# Patient Record
Sex: FEMALE | Race: WHITE | NOT HISPANIC OR LATINO | Employment: FULL TIME | URBAN - METROPOLITAN AREA
[De-identification: names, ages, dates, MRNs, and addresses within clinical notes are randomized per-mention and may not be internally consistent; named-entity substitution may affect disease eponyms.]

---

## 2017-02-09 DIAGNOSIS — Z00.00 ROUTINE ADULT HEALTH MAINTENANCE: Primary | ICD-10-CM

## 2017-02-10 ENCOUNTER — LAB (OUTPATIENT)
Dept: FAMILY MEDICINE CLINIC | Facility: CLINIC | Age: 27
End: 2017-02-10

## 2017-02-10 DIAGNOSIS — Z00.00 ROUTINE ADULT HEALTH MAINTENANCE: ICD-10-CM

## 2017-02-10 LAB
ALBUMIN SERPL-MCNC: 4.2 G/DL (ref 3.5–5.2)
ALBUMIN/GLOB SERPL: 1.6 G/DL
ALP SERPL-CCNC: 74 U/L (ref 40–129)
ALT SERPL-CCNC: 16 U/L (ref 5–33)
AST SERPL-CCNC: 20 U/L (ref 5–32)
BILIRUB SERPL-MCNC: 0.5 MG/DL (ref 0.2–1.2)
BUN SERPL-MCNC: 11 MG/DL (ref 6–20)
BUN/CREAT SERPL: 15.3 (ref 7–25)
CALCIUM SERPL-MCNC: 9.1 MG/DL (ref 8.6–10.5)
CHLORIDE SERPL-SCNC: 102 MMOL/L (ref 98–107)
CHOLEST SERPL-MCNC: 161 MG/DL (ref 0–200)
CO2 SERPL-SCNC: 27.1 MMOL/L (ref 22–29)
CREAT SERPL-MCNC: 0.72 MG/DL (ref 0.57–1)
ERYTHROCYTE [DISTWIDTH] IN BLOOD BY AUTOMATED COUNT: 13.1 % (ref 11.5–14.5)
GLOBULIN SER CALC-MCNC: 2.7 GM/DL
GLUCOSE SERPL-MCNC: 83 MG/DL (ref 65–99)
HCT VFR BLD AUTO: 40.6 % (ref 37–47)
HDLC SERPL-MCNC: 55 MG/DL (ref 40–60)
HGB BLD-MCNC: 13.2 G/DL (ref 12–16)
LDLC SERPL CALC-MCNC: 89 MG/DL (ref 0–100)
MCH RBC QN AUTO: 28.8 PG (ref 27–31)
MCHC RBC AUTO-ENTMCNC: 32.5 G/DL (ref 31–37)
MCV RBC AUTO: 88.6 FL (ref 81–99)
PLATELET # BLD AUTO: 244 10*3/MM3 (ref 140–500)
POTASSIUM SERPL-SCNC: 4.4 MMOL/L (ref 3.5–5.2)
PROT SERPL-MCNC: 6.9 G/DL (ref 6–8.5)
RBC # BLD AUTO: 4.58 10*6/MM3 (ref 4.2–5.4)
SODIUM SERPL-SCNC: 140 MMOL/L (ref 136–145)
TRIGL SERPL-MCNC: 84 MG/DL (ref 0–150)
VLDLC SERPL CALC-MCNC: 16.8 MG/DL (ref 7–27)
WBC # BLD AUTO: 9.51 10*3/MM3 (ref 4.8–10.8)

## 2017-02-24 ENCOUNTER — OFFICE VISIT (OUTPATIENT)
Dept: FAMILY MEDICINE CLINIC | Facility: CLINIC | Age: 27
End: 2017-02-24

## 2017-02-24 VITALS
WEIGHT: 248.5 LBS | TEMPERATURE: 98.2 F | HEART RATE: 86 BPM | SYSTOLIC BLOOD PRESSURE: 132 MMHG | OXYGEN SATURATION: 99 % | DIASTOLIC BLOOD PRESSURE: 74 MMHG | HEIGHT: 66 IN | BODY MASS INDEX: 39.94 KG/M2

## 2017-02-24 DIAGNOSIS — T50.905A WEIGHT GAIN DUE TO MEDICATION: ICD-10-CM

## 2017-02-24 DIAGNOSIS — R63.5 WEIGHT GAIN DUE TO MEDICATION: ICD-10-CM

## 2017-02-24 DIAGNOSIS — Z00.00 ROUTINE ADULT HEALTH MAINTENANCE: Primary | ICD-10-CM

## 2017-02-24 DIAGNOSIS — J45.30 MILD PERSISTENT ASTHMA WITHOUT COMPLICATION: ICD-10-CM

## 2017-02-24 LAB — TSH SERPL DL<=0.005 MIU/L-ACNC: 0.66 MIU/ML (ref 0.27–4.2)

## 2017-02-24 PROCEDURE — 99213 OFFICE O/P EST LOW 20 MIN: CPT | Performed by: FAMILY MEDICINE

## 2017-02-24 NOTE — PROGRESS NOTES
Subjective   Paola Bowles is a 27 y.o. female who is here for   Chief Complaint   Patient presents with   • Asthma   .     History of Present Illness   Asthma: Paola Bowles presents for evaluation of asthma. Patient's symptoms include dyspnea, non-productive cough and wheezing. Associated symptoms include nasal congestion and nonproductive cough. The patient has been suffering from these symptoms for approximately 10 years. Symptoms have been rapidly improving since their onset. Medications used in the past to treat these symptoms include beta agonist inhalers, oral steroids, inhaled steroids and leukotriene antagonists. Suspected precipitants include dust and pollens. Patient is awoken from sleep approximately none times per night. Patient has required Emergency Room treatment for these symptoms, and has not required hospitalization. The patient has not been intubated in the past.  She has concerns about her weight gain due to her steroid inhaler  We discussed unlikely but we may try to changer her controller to ANORO,     O/w feels ok  Wants thyroid checked = fatigue , weight gain    The following portions of the patient's history were reviewed and updated as appropriate: allergies, current medications, past family history, past medical history, past social history, past surgical history and problem list.    Review of Systems    Objective   Physical Exam   Constitutional: She appears well-developed and well-nourished. No distress.   HENT:   Mouth/Throat: Oropharynx is clear and moist.   Eyes: Conjunctivae are normal.   Neck: Normal range of motion. No thyromegaly present.   Cardiovascular: Normal rate.    Pulmonary/Chest: Effort normal and breath sounds normal. No respiratory distress. She has no wheezes.   Neurological: She is alert.   Psychiatric: She has a normal mood and affect.   Nursing note and vitals reviewed.     Results for orders placed or performed in visit on 02/10/17   Lipid Panel   Result  Value Ref Range    Total Cholesterol 161 0 - 200 mg/dL    Triglycerides 84 0 - 150 mg/dL    HDL Cholesterol 55 40 - 60 mg/dL    VLDL Cholesterol 16.8 7 - 27 mg/dL    LDL Cholesterol  89 0 - 100 mg/dL   CBC (No Diff)   Result Value Ref Range    WBC 9.51 4.80 - 10.80 10*3/mm3    RBC 4.58 4.20 - 5.40 10*6/mm3    Hemoglobin 13.2 12.0 - 16.0 g/dL    Hematocrit 40.6 37.0 - 47.0 %    MCV 88.6 81.0 - 99.0 fL    MCH 28.8 27.0 - 31.0 pg    MCHC 32.5 31.0 - 37.0 g/dL    RDW 13.1 11.5 - 14.5 %    Platelets 244 140 - 500 10*3/mm3   Comprehensive Metabolic Panel   Result Value Ref Range    Glucose 83 65 - 99 mg/dL    BUN 11 6 - 20 mg/dL    Creatinine 0.72 0.57 - 1.00 mg/dL    eGFR Non African Am 97 >60 mL/min/1.73    eGFR African Am 118 >60 mL/min/1.73    BUN/Creatinine Ratio 15.3 7.0 - 25.0    Sodium 140 136 - 145 mmol/L    Potassium 4.4 3.5 - 5.2 mmol/L    Chloride 102 98 - 107 mmol/L    Total CO2 27.1 22.0 - 29.0 mmol/L    Calcium 9.1 8.6 - 10.5 mg/dL    Total Protein 6.9 6.0 - 8.5 g/dL    Albumin 4.20 3.50 - 5.20 g/dL    Globulin 2.7 gm/dL    A/G Ratio 1.6 g/dL    Total Bilirubin 0.5 0.2 - 1.2 mg/dL    Alkaline Phosphatase 74 40 - 129 U/L    AST (SGOT) 20 5 - 32 U/L    ALT (SGPT) 16 5 - 33 U/L         Assessment/Plan   Paola was seen today for asthma.    Diagnoses and all orders for this visit:    Routine adult health maintenance  -     TSH    Weight gain due to medication  -     Umeclidinium-Vilanterol (ANORO ELLIPTA) 62.5-25 MCG/INH aerosol powder ; Inhale 1 puff Daily.    Mild persistent asthma without complication      There are no Patient Instructions on file for this visit.    There are no discontinued medications.     Return in about 1 year (around 2/24/2018).    Dr. Luis Armando Kidd  [place], [place]

## 2017-04-27 ENCOUNTER — OFFICE VISIT (OUTPATIENT)
Dept: OBSTETRICS AND GYNECOLOGY | Facility: CLINIC | Age: 27
End: 2017-04-27

## 2017-04-27 VITALS
BODY MASS INDEX: 40.5 KG/M2 | SYSTOLIC BLOOD PRESSURE: 130 MMHG | HEIGHT: 66 IN | DIASTOLIC BLOOD PRESSURE: 70 MMHG | WEIGHT: 252 LBS

## 2017-04-27 DIAGNOSIS — Z30.09 ENCOUNTER FOR OTHER GENERAL COUNSELING OR ADVICE ON CONTRACEPTION: ICD-10-CM

## 2017-04-27 DIAGNOSIS — Z01.419 GYNECOLOGIC EXAM NORMAL: Primary | ICD-10-CM

## 2017-04-27 LAB
B-HCG UR QL: NEGATIVE
BILIRUB BLD-MCNC: NEGATIVE MG/DL
CLARITY, POC: CLEAR
COLOR UR: YELLOW
GLUCOSE UR STRIP-MCNC: NEGATIVE MG/DL
INTERNAL NEGATIVE CONTROL: NEGATIVE
INTERNAL POSITIVE CONTROL: POSITIVE
KETONES UR QL: NEGATIVE
LEUKOCYTE EST, POC: NEGATIVE
Lab: NORMAL
NITRITE UR-MCNC: NEGATIVE MG/ML
PH UR: 6 [PH] (ref 5–8)
PROT UR STRIP-MCNC: NEGATIVE MG/DL
RBC # UR STRIP: NEGATIVE /UL
SP GR UR: 1.02 (ref 1–1.03)
UROBILINOGEN UR QL: NORMAL

## 2017-04-27 PROCEDURE — 99395 PREV VISIT EST AGE 18-39: CPT | Performed by: OBSTETRICS & GYNECOLOGY

## 2017-04-27 PROCEDURE — 81025 URINE PREGNANCY TEST: CPT | Performed by: OBSTETRICS & GYNECOLOGY

## 2017-04-27 PROCEDURE — 81002 URINALYSIS NONAUTO W/O SCOPE: CPT | Performed by: OBSTETRICS & GYNECOLOGY

## 2017-04-27 NOTE — PROGRESS NOTES
GYN Annual Exam     CC- Here for annual exam.     Paola Bowles is a 27 y.o. female who presents for annual well woman exam. Periods are regular every 28-30 days, lasting 4 days. She was placed on Minipill due to HTN and does not like it. She has some irregular spotting.    OB History     None, no plans.          Menarche: 13  Current contraception: oral progesterone-only contraceptive  History of abnormal Pap smear: no  History of abnormal mammogram: no  Family history of uterine, colon or ovarian cancer: no  Family history of breast cancer: no  H/o STDs: no  Declines Gardaasil    Health Maintenance   Topic Date Due   • TDAP/TD VACCINES (1 - Tdap) 02/04/2009   • PAP SMEAR  03/11/2016   • INFLUENZA VACCINE  08/01/2016       Past Medical History:   Diagnosis Date   • Asthma    • Menstrual problem  HTN        Past Surgical History:   Procedure Laterality Date   • WISDOM TOOTH EXTRACTION     • WISDOM TOOTH EXTRACTION           Current Outpatient Prescriptions:   •  albuterol (PROVENTIL HFA;VENTOLIN HFA) 108 (90 BASE) MCG/ACT inhaler, Inhale 2 puffs every 4 (four) hours as needed., Disp: , Rfl:   •  cetirizine (ZyrTEC) 10 MG tablet, Take 10 mg by mouth daily., Disp: , Rfl:   •  Flunisolide HFA (AEROSPAN) 80 MCG/ACT aerosol solution, Inhale 2 puffs 2 (Two) Times a Day., Disp: 1 inhaler, Rfl: 11  •  montelukast (SINGULAIR) 10 MG tablet, TAKE 1 TABLET BY MOUTH DAILY AT BEDTIME, Disp: , Rfl: 1  •  norethindrone (MICRONOR) 0.35 MG tablet, , Disp: , Rfl:     Allergies   Allergen Reactions   • Penicillins    • Sulfa Antibiotics        Social History   Substance Use Topics   • Smoking status: Never Smoker   • Smokeless tobacco: Never Used   • Alcohol use 0.6 oz/week     1 Glasses of wine per week       Family History   Problem Relation Age of Onset   • Thyroid disease Mother      Graves   • Ulcerative colitis Mother    • Hypertension Father    • Cancer Maternal Grandmother    • Asthma Paternal Grandmother    • Heart disease  "Paternal Grandfather    • Diabetes Paternal Grandfather    • No Known Problems Sister    • Ulcerative colitis Brother    • Breast cancer Neg Hx    • Ovarian cancer Neg Hx    • Uterine cancer Neg Hx    • Colon cancer Neg Hx    • Pulmonary embolism Neg Hx    • Deep vein thrombosis Neg Hx        Review of Systems   Constitutional: Negative for appetite change, fatigue, fever and unexpected weight change.   Respiratory: Negative for cough and shortness of breath.    Cardiovascular: Negative for chest pain and palpitations.   Gastrointestinal: Negative for abdominal distention, abdominal pain, constipation, diarrhea and nausea.   Genitourinary: Positive for vaginal bleeding (some irreg spotting). Negative for dyspareunia, dysuria, menstrual problem, pelvic pain and vaginal discharge.   Skin: Negative for color change and rash.   Neurological: Negative for headaches.   Psychiatric/Behavioral: Negative for dysphoric mood. The patient is not nervous/anxious.        /70  Ht 66\" (167.6 cm)  Wt 252 lb (114 kg)  LMP 04/15/2017  BMI 40.67 kg/m2    Physical Exam   Constitutional: She is oriented to person, place, and time. She appears well-developed and well-nourished.   HENT:   Head: Normocephalic and atraumatic.   Neck: No thyromegaly present.   Cardiovascular: Normal rate and regular rhythm.    Pulmonary/Chest: Effort normal and breath sounds normal. Right breast exhibits no inverted nipple, no mass, no nipple discharge, no skin change and no tenderness. Left breast exhibits no inverted nipple, no mass, no nipple discharge, no skin change and no tenderness. Breasts are symmetrical. There is no breast swelling.   Abdominal: Soft. Bowel sounds are normal. She exhibits no distension and no mass. There is no tenderness. No hernia.   Genitourinary: Uterus normal. No breast tenderness, discharge or bleeding. Pelvic exam was performed with patient supine. There is no rash, tenderness or lesion on the right labia. There is " no rash, tenderness or lesion on the left labia. Cervix exhibits no motion tenderness, no discharge and no friability. Right adnexum displays no mass, no tenderness and no fullness. Left adnexum displays no mass, no tenderness and no fullness. No erythema, tenderness or bleeding in the vagina. No signs of injury around the vagina. No vaginal discharge found.   Genitourinary Comments: Exam limited due to habitus   Neurological: She is oriented to person, place, and time.   Skin: Skin is warm and dry.   Psychiatric: She has a normal mood and affect. Her behavior is normal. Judgment and thought content normal.   Vitals reviewed.         Assessment/Plan    1) GYN HM: pap/C/G/T  SBE demonstrated and encouraged.  2) STD screening: declines Condoms encouraged.  3) Contraception: hates Micronor, consider Kyleena. Will check benefits, msg sent to .  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   8) Declines Gardasil.  9)Follow up prn or 1 year       Diagnoses and all orders for this visit:    Gynecologic exam normal  -     POC Pregnancy, Urine  -     POC Urinalysis Dipstick  -     Pap IG, Rfx HPV ASCU    Encounter for other general counseling or advice on contraception          Tanja Jeter MD  4/30/2017  12:11 PM

## 2017-04-30 PROBLEM — I10 HTN (HYPERTENSION): Status: ACTIVE | Noted: 2017-04-30

## 2017-05-02 LAB
CONV .: ABNORMAL
CYTOLOGIST CVX/VAG CYTO: ABNORMAL
CYTOLOGY CVX/VAG DOC THIN PREP: ABNORMAL
DX ICD CODE: ABNORMAL
DX ICD CODE: ABNORMAL
HIV 1 & 2 AB SER-IMP: ABNORMAL
OTHER STN SPEC: ABNORMAL
PATH REPORT.FINAL DX SPEC: ABNORMAL
PATHOLOGIST CVX/VAG CYTO: ABNORMAL
STAT OF ADQ CVX/VAG CYTO-IMP: ABNORMAL

## 2017-05-24 DIAGNOSIS — J45.30 MILD PERSISTENT ASTHMA WITHOUT COMPLICATION: Primary | ICD-10-CM

## 2017-05-24 RX ORDER — FLUTICASONE PROPIONATE 110 UG/1
1 AEROSOL, METERED RESPIRATORY (INHALATION)
Qty: 1 INHALER | Refills: 11 | Status: SHIPPED | OUTPATIENT
Start: 2017-05-24 | End: 2018-01-05 | Stop reason: CLARIF

## 2017-05-31 ENCOUNTER — OFFICE VISIT (OUTPATIENT)
Dept: OBSTETRICS AND GYNECOLOGY | Facility: CLINIC | Age: 27
End: 2017-05-31

## 2017-05-31 VITALS
SYSTOLIC BLOOD PRESSURE: 126 MMHG | BODY MASS INDEX: 39.7 KG/M2 | WEIGHT: 247 LBS | HEIGHT: 66 IN | DIASTOLIC BLOOD PRESSURE: 84 MMHG

## 2017-05-31 DIAGNOSIS — R87.612 LGSIL ON PAP SMEAR OF CERVIX: ICD-10-CM

## 2017-05-31 DIAGNOSIS — Z13.9 SCREENING: Primary | ICD-10-CM

## 2017-05-31 LAB

## 2017-05-31 PROCEDURE — 57454 BX/CURETT OF CERVIX W/SCOPE: CPT | Performed by: OBSTETRICS & GYNECOLOGY

## 2017-05-31 PROCEDURE — 81002 URINALYSIS NONAUTO W/O SCOPE: CPT | Performed by: OBSTETRICS & GYNECOLOGY

## 2017-05-31 PROCEDURE — 81025 URINE PREGNANCY TEST: CPT | Performed by: OBSTETRICS & GYNECOLOGY

## 2017-06-06 LAB
DX ICD CODE: NORMAL
DX ICD CODE: NORMAL
PATH REPORT.FINAL DX SPEC: NORMAL
PATH REPORT.GROSS SPEC: NORMAL
PATH REPORT.SITE OF ORIGIN SPEC: NORMAL
PATHOLOGIST NAME: NORMAL
PAYMENT PROCEDURE: NORMAL

## 2017-06-08 ENCOUNTER — OFFICE VISIT (OUTPATIENT)
Dept: OBSTETRICS AND GYNECOLOGY | Facility: CLINIC | Age: 27
End: 2017-06-08

## 2017-06-08 VITALS
WEIGHT: 247 LBS | HEIGHT: 66 IN | SYSTOLIC BLOOD PRESSURE: 124 MMHG | DIASTOLIC BLOOD PRESSURE: 80 MMHG | BODY MASS INDEX: 39.7 KG/M2

## 2017-06-08 DIAGNOSIS — Z13.9 SCREENING: Primary | ICD-10-CM

## 2017-06-08 DIAGNOSIS — Z30.431 IUD CHECK UP: ICD-10-CM

## 2017-06-08 DIAGNOSIS — Z30.430 ENCOUNTER FOR INSERTION OF INTRAUTERINE CONTRACEPTIVE DEVICE: ICD-10-CM

## 2017-06-08 LAB
B-HCG UR QL: NEGATIVE
INTERNAL NEGATIVE CONTROL: NEGATIVE
INTERNAL POSITIVE CONTROL: POSITIVE
Lab: NORMAL

## 2017-06-08 PROCEDURE — S4989 CONTRACEPT IUD: HCPCS | Performed by: NURSE PRACTITIONER

## 2017-06-08 PROCEDURE — 58300 INSERT INTRAUTERINE DEVICE: CPT | Performed by: NURSE PRACTITIONER

## 2017-06-08 PROCEDURE — 81025 URINE PREGNANCY TEST: CPT | Performed by: NURSE PRACTITIONER

## 2017-06-08 NOTE — PROGRESS NOTES
Procedure: Intrauterine device insertion    Chief Complaint: IUD insertion. [facility] 6/6/17     Pre procedure indication 1) Desires Kylena  Post procedure indication 1) Desires Kylena    The risks, benefits, and alternatives to IUD were explained at length with the patient. All her questions were answered and consents were signed. The IUD was patient supplied.     The patient was placed in a dorsal lithotomy position on the examining table in [facility]. A bimanual exam confirmed the uterus was normal in size, anteverted. A warmed metal speculum was inserted into the vagina and the cervix was brought into view.    The cervix was prepped with Betadine. The anterior lip was grasped with a single-tooth tenaculum. The endometrial cavity was then sounded to 6 cm without use of a dilator. The IUD was removed in a sterile fashion.    The  was then carefully advanced to the cervical canal into the uterus to the level of the fundus. This was then backed off about 1.5-2 cm to allow sufficient space for the arms to open. The device was deployed. The  was removed carefully from the uterus. The threads were then cut leaving 2-3 cm visible outside of the cervix.  The single-tooth tenaculum was removed from the anterior lip. Good hemostasis was noted.     All other instruments were removed from the vagina.   There were no complications.  The patient tolerated the procedure well with a minimal amount of discomfort.    The patient was counseled about the need to return in 4 weeks for string check.     She was counseled about the need to use a backup method of contraception such as condoms for 1-2 weeks. The patient is counseled to contact us if she has any significant or increasing bleeding, pain, fever, chills, or other concerns. She is instructed to see a doctor right away if she believes that she may be pregnant at any time with the IUD in place.    Nguyen Garcia, GANGA    6/8/2017  1:14 PM

## 2017-07-03 ENCOUNTER — OFFICE VISIT (OUTPATIENT)
Dept: OBSTETRICS AND GYNECOLOGY | Facility: CLINIC | Age: 27
End: 2017-07-03

## 2017-07-03 VITALS
SYSTOLIC BLOOD PRESSURE: 130 MMHG | DIASTOLIC BLOOD PRESSURE: 80 MMHG | WEIGHT: 247 LBS | BODY MASS INDEX: 39.7 KG/M2 | HEIGHT: 66 IN

## 2017-07-03 DIAGNOSIS — R87.612 LGSIL ON PAP SMEAR OF CERVIX: ICD-10-CM

## 2017-07-03 DIAGNOSIS — Z30.431 IUD CHECK UP: Primary | ICD-10-CM

## 2017-07-03 PROCEDURE — 99213 OFFICE O/P EST LOW 20 MIN: CPT | Performed by: OBSTETRICS & GYNECOLOGY

## 2017-07-03 NOTE — PROGRESS NOTES
"      Paola Bowles is a 27 y.o. patient who presents for follow up of   Chief Complaint   Patient presents with   • Follow-up     string ck     26 yo est pt here for Kyleena string check. Had Kyleena inserted on 6/8/17. She has had some spotting but other than that she feels fine. No other issues. BPs have been normal        The following portions of the patient's history were reviewed and updated as appropriate: allergies, current medications and problem list.    Review of Systems   Genitourinary: Positive for vaginal bleeding. Negative for dyspareunia, flank pain, menstrual problem, pelvic pain, vaginal discharge and vaginal pain.   All other systems reviewed and are negative.      /80  Ht 66\" (167.6 cm)  Wt 247 lb (112 kg)  LMP 06/06/2017 (Exact Date)  Breastfeeding? No  BMI 39.87 kg/m2    Physical Exam   Constitutional: She is oriented to person, place, and time. She appears well-developed and well-nourished.   HENT:   Head: Normocephalic and atraumatic.   Abdominal: Soft. Bowel sounds are normal. She exhibits no distension and no mass. There is no tenderness. There is no rebound and no guarding. No hernia.   Genitourinary: Uterus normal. There is no rash, tenderness, lesion or injury on the right labia. There is no rash, tenderness, lesion or injury on the left labia. Cervix exhibits no motion tenderness, no discharge and no friability. Right adnexum displays no mass, no tenderness and no fullness. Left adnexum displays no mass, no tenderness and no fullness. No erythema, tenderness or bleeding in the vagina. No signs of injury around the vagina. No vaginal discharge found.   Genitourinary Comments: IUD string seen easily   Neurological: She is alert and oriented to person, place, and time.   Skin: Skin is warm and dry.   Psychiatric: She has a normal mood and affect. Her behavior is normal. Judgment and thought content normal.   Nursing note and vitals reviewed.    A/P:  1. Kyleena IUD check- " normal string check.  2. 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 Importance of f/u d/w pt at length.       Assessment/Plan   Paola was seen today for follow-up.    Diagnoses and all orders for this visit:    IUD check up    LGSIL on Pap smear of cervix                 No Follow-up on file.      Tanja Jeter MD    7/3/2017  12:12 PM

## 2017-10-26 ENCOUNTER — OFFICE VISIT (OUTPATIENT)
Dept: FAMILY MEDICINE CLINIC | Facility: CLINIC | Age: 27
End: 2017-10-26

## 2017-10-26 VITALS
RESPIRATION RATE: 18 BRPM | OXYGEN SATURATION: 100 % | HEIGHT: 66 IN | WEIGHT: 256 LBS | DIASTOLIC BLOOD PRESSURE: 86 MMHG | SYSTOLIC BLOOD PRESSURE: 118 MMHG | BODY MASS INDEX: 41.14 KG/M2 | HEART RATE: 84 BPM

## 2017-10-26 DIAGNOSIS — J06.9 ACUTE URI: Primary | ICD-10-CM

## 2017-10-26 PROCEDURE — 99213 OFFICE O/P EST LOW 20 MIN: CPT | Performed by: NURSE PRACTITIONER

## 2017-10-26 NOTE — PROGRESS NOTES
Subjective   Paola Bowles is a 27 y.o. female.     Chief Complaint   Patient presents with   • Sinusitis     congestion, cough, headache, chills x 2 days, has been taking sinus and allergy medication at home     Social History   Substance Use Topics   • Smoking status: Never Smoker   • Smokeless tobacco: Never Used   • Alcohol use 0.6 oz/week     1 Glasses of wine per week       HPI Comments: Mom was sick last week and recovered well without antibiotics.     Left flank/CVA tenderness- no UTI symptoms. Hurts with deep breaths and touching it. Started yesterday. Has been coughing.     URI    This is a new problem. The current episode started in the past 7 days (tuesday- day 2). The problem has been gradually worsening. Associated symptoms include congestion, coughing, headaches, rhinorrhea, sinus pain and a sore throat (yesterday- gone today. ). Pertinent negatives include no ear pain, plugged ear sensation, sneezing or wheezing. Treatments tried: tylenol sinus and headache. The treatment provided no relief.     Review of Systems   Constitutional: Positive for chills and fatigue. Negative for fever.   HENT: Positive for congestion, postnasal drip, rhinorrhea, sinus pain and sore throat (yesterday- gone today. ). Negative for ear pain and sneezing.    Respiratory: Positive for cough. Negative for chest tightness, shortness of breath and wheezing.    Neurological: Positive for headaches.   All other systems reviewed and are negative.    Physical Exam   Gen: mildly ill appearing, alert  Ears: Tm's bulging without redness  Nose:  Congestion  Throat:  Red without exudate, some drainage, tonsils okay  Neck: no LAD  Lung: good air movement, regular RR  Heart: RR without murmur  Skin: no rash.    The following portions of the patient's history were reviewed and updated as appropriate: allergies, current medications,past medical hx, family hx, past social history an...    Chief Complaint   Patient presents with   •  "Sinusitis     congestion, cough, headache, chills x 2 days, has been taking sinus and allergy medication at home         Vitals:    10/26/17 1355   BP: 118/86   BP Location: Left arm   Patient Position: Sitting   Cuff Size: Large Adult   Pulse: 84   Resp: 18   SpO2: 100%   Weight: 256 lb (116 kg)   Height: 66\" (167.6 cm)         Assessment/Plan   Problems Addressed this Visit     None      Visit Diagnoses     Acute URI    -  Primary             Viral URI- may call on Monday if no improvement or continued worsening.   Tylenol or Advil as needed for pain, fever, muscle aches  Plenty of fluids  Hand washing discussed  Off work or school note given if needed.  Warm tea for throat.      Sienna SCHULER  [place], [place]  [facility]  "

## 2017-11-03 ENCOUNTER — OFFICE VISIT (OUTPATIENT)
Dept: OBSTETRICS AND GYNECOLOGY | Facility: CLINIC | Age: 27
End: 2017-11-03

## 2017-11-03 VITALS
HEIGHT: 66 IN | DIASTOLIC BLOOD PRESSURE: 84 MMHG | WEIGHT: 257 LBS | BODY MASS INDEX: 41.3 KG/M2 | SYSTOLIC BLOOD PRESSURE: 132 MMHG

## 2017-11-03 DIAGNOSIS — R10.9 FLANK PAIN: ICD-10-CM

## 2017-11-03 DIAGNOSIS — Z30.431 IUD CHECK UP: ICD-10-CM

## 2017-11-03 DIAGNOSIS — Z87.42 HISTORY OF ABNORMAL CERVICAL PAP SMEAR: Primary | ICD-10-CM

## 2017-11-03 PROBLEM — N63.0 BREAST MASS: Status: RESOLVED | Noted: 2017-11-03 | Resolved: 2017-11-03

## 2017-11-03 PROBLEM — Z30.011 ORAL CONTRACEPTION INITIATION: Status: ACTIVE | Noted: 2017-11-03

## 2017-11-03 PROBLEM — N63.0 BREAST MASS: Status: ACTIVE | Noted: 2017-11-03

## 2017-11-03 LAB
B-HCG UR QL: NEGATIVE
BILIRUB BLD-MCNC: NEGATIVE MG/DL
CLARITY, POC: CLEAR
COLOR UR: YELLOW
GLUCOSE UR STRIP-MCNC: NEGATIVE MG/DL
INTERNAL NEGATIVE CONTROL: NEGATIVE
INTERNAL POSITIVE CONTROL: POSITIVE
KETONES UR QL: NEGATIVE
LEUKOCYTE EST, POC: NEGATIVE
Lab: NORMAL
NITRITE UR-MCNC: NEGATIVE MG/ML
PH UR: 6 [PH] (ref 5–8)
PROT UR STRIP-MCNC: NEGATIVE MG/DL
RBC # UR STRIP: ABNORMAL /UL
SP GR UR: 1.03 (ref 1–1.03)
UROBILINOGEN UR QL: NORMAL

## 2017-11-03 PROCEDURE — 99213 OFFICE O/P EST LOW 20 MIN: CPT | Performed by: OBSTETRICS & GYNECOLOGY

## 2017-11-03 PROCEDURE — 81002 URINALYSIS NONAUTO W/O SCOPE: CPT | Performed by: OBSTETRICS & GYNECOLOGY

## 2017-11-03 PROCEDURE — 81025 URINE PREGNANCY TEST: CPT | Performed by: OBSTETRICS & GYNECOLOGY

## 2017-11-03 NOTE — PROGRESS NOTES
"      Paola Bowles is a 27 y.o. patient who presents for follow up of   Chief Complaint   Patient presents with   • Follow-up     6 month repeat pap     26 yo est pt here for first 6 month repeat pap. In 4/2017 she had LGSIL in CIERA 1 on biopsy. She is liking her Kyleena, no issues. She is having some flank pain for > 7 days. She is having subjective fevers, no N/V. She has blood in her urine today.         The following portions of the patient's history were reviewed and updated as appropriate: allergies, current medications and problem list.    Review of Systems   Constitutional: Positive for appetite change, chills, fatigue and fever.   Gastrointestinal: Positive for nausea. Negative for abdominal distention, abdominal pain, constipation, diarrhea and vomiting.   Genitourinary: Positive for flank pain and vaginal bleeding. Negative for dysuria, genital sores and hematuria.   Musculoskeletal: Positive for back pain.   Neurological: Negative for headaches.   Hematological: Negative for adenopathy. Does not bruise/bleed easily.   Psychiatric/Behavioral: Negative for decreased concentration and dysphoric mood.       /84  Ht 66\" (167.6 cm)  Wt 257 lb (117 kg)  LMP 10/30/2017  BMI 41.48 kg/m2    Physical Exam   Constitutional: She is oriented to person, place, and time. She appears well-developed and well-nourished.   HENT:   Head: Normocephalic and atraumatic.   Abdominal: Soft. Bowel sounds are normal. She exhibits no distension and no mass. There is no tenderness. There is no rebound and no guarding. No hernia.   Genitourinary: Uterus normal. There is no rash, tenderness or lesion on the right labia. There is no rash, tenderness or lesion on the left labia. Cervix exhibits no motion tenderness, no discharge and no friability. Right adnexum displays no mass, no tenderness and no fullness. Left adnexum displays no mass, no tenderness and no fullness. There is bleeding in the vagina. No erythema or " tenderness in the vagina. No foreign body in the vagina. No signs of injury around the vagina. No vaginal discharge found.   Genitourinary Comments: IUD string seen  + VB noted   Musculoskeletal: She exhibits tenderness (L flank pain).   Neurological: She is alert and oriented to person, place, and time.   Skin: Skin is warm.   Psychiatric: She has a normal mood and affect. Her behavior is normal. Judgment and thought content normal.   Nursing note and vitals reviewed.    A/P:  1. LGSIL/CIERA 1- first 6 month repeat pap today.  2. Kyleena IUD in place.  3. Flank pain- large blood in urine. Suspect kidney stone.Offered to schedule CT scan but pt declines and will call her primary MD.     Assessment/Plan   Paola was seen today for follow-up.    Diagnoses and all orders for this visit:    History of abnormal cervical Pap smear  -     Pap IG, Rfx HPV ASCU - ThinPrep Vial, Cervix  -     POC Urinalysis Dipstick  -     POC Pregnancy, Urine    IUD check up    Flank pain                   No Follow-up on file.      Tanja Jeter MD    11/3/2017  7:25 PM

## 2017-11-04 NOTE — PROGRESS NOTES
Large blood noted on UA. Pt having some vaginal spotting, also having flank pain and will call her primary MD re: possible kidney stone.

## 2017-11-08 LAB
CONV .: NORMAL
CYTOLOGIST CVX/VAG CYTO: NORMAL
CYTOLOGY CVX/VAG DOC THIN PREP: NORMAL
DX ICD CODE: NORMAL
HIV 1 & 2 AB SER-IMP: NORMAL
Lab: NORMAL
OTHER STN SPEC: NORMAL
PATH REPORT.FINAL DX SPEC: NORMAL
STAT OF ADQ CVX/VAG CYTO-IMP: NORMAL

## 2017-11-13 ENCOUNTER — TELEPHONE (OUTPATIENT)
Dept: OBSTETRICS AND GYNECOLOGY | Facility: CLINIC | Age: 27
End: 2017-11-13

## 2017-11-13 NOTE — TELEPHONE ENCOUNTER
----- Message from Jamal Levine MA sent at 11/9/2017 10:59 AM EST -----  Regarding: colpo recall  colpo recall  ----- Message -----     From: Tanja Jeter MD     Sent: 11/9/2017   5:12 AM       To: [facility] Clinical Pool    PIP= normal pap, repeat pap in 6 months. Please place in colpo recall

## 2018-01-05 DIAGNOSIS — J45.30 MILD PERSISTENT ASTHMA WITHOUT COMPLICATION: Primary | ICD-10-CM

## 2018-05-11 ENCOUNTER — OFFICE VISIT (OUTPATIENT)
Dept: FAMILY MEDICINE CLINIC | Facility: CLINIC | Age: 28
End: 2018-05-11

## 2018-05-11 VITALS
HEART RATE: 80 BPM | BODY MASS INDEX: 42.43 KG/M2 | OXYGEN SATURATION: 98 % | WEIGHT: 264 LBS | DIASTOLIC BLOOD PRESSURE: 74 MMHG | HEIGHT: 66 IN | TEMPERATURE: 98.7 F | SYSTOLIC BLOOD PRESSURE: 128 MMHG

## 2018-05-11 DIAGNOSIS — J45.30 MILD PERSISTENT ASTHMA WITHOUT COMPLICATION: Primary | ICD-10-CM

## 2018-05-11 DIAGNOSIS — Z00.00 ROUTINE ADULT HEALTH MAINTENANCE: ICD-10-CM

## 2018-05-11 PROBLEM — R10.9 FLANK PAIN: Status: RESOLVED | Noted: 2017-11-03 | Resolved: 2018-05-11

## 2018-05-11 PROCEDURE — 90471 IMMUNIZATION ADMIN: CPT | Performed by: FAMILY MEDICINE

## 2018-05-11 PROCEDURE — 90715 TDAP VACCINE 7 YRS/> IM: CPT | Performed by: FAMILY MEDICINE

## 2018-05-11 PROCEDURE — 99213 OFFICE O/P EST LOW 20 MIN: CPT | Performed by: FAMILY MEDICINE

## 2018-05-11 NOTE — PROGRESS NOTES
Subjective   Paola Bowles is a 28 y.o. female who is here for   Chief Complaint   Patient presents with   • Follow-up   • Asthma   .     History of Present Illness   Asthma Follow-up: She has previously been evaluated here for asthma and presents for an asthma follow-up; she is not currently in exacerbation. Symptoms currently include dyspnea, non-productive cough and wheezing and occur less than 2x/month. Observed precipitants include dust, smoke and upper respiratory infection.  Current limitations in activity from asthma: none.  Number of days of school or work missed in the last month: 0. Number of Emergency Department visits in the previous month: none. Frequency of use of quick-relief meds: <1 a month. The patient reports adherence to this regimen     Getting  his fall  Asked about Tdap  A good idea as she is a       The following portions of the patient's history were reviewed and updated as appropriate: allergies, current medications, past family history, past medical history, past social history, past surgical history and problem list.    Review of Systems    Objective   Physical Exam   Constitutional: She appears well-developed and well-nourished.   Cardiovascular: Normal rate.    Pulmonary/Chest: Effort normal and breath sounds normal. No respiratory distress. She has no wheezes. She has no rales. She exhibits no tenderness.   Nursing note and vitals reviewed.      Assessment/Plan   Paola was seen today for follow-up and asthma.    Diagnoses and all orders for this visit:    Mild persistent asthma without complication  -     Beclomethasone Diprop HFA (QVAR REDIHALER) 40 MCG/ACT inhaler; Inhale 2 puffs 2 (Two) Times a Day.    Routine adult health maintenance  -     Tdap Vaccine Greater Than or Equal To 6yo IM      There are no Patient Instructions on file for this visit.    Medications Discontinued During This Encounter   Medication Reason   • beclomethasone (QVAR) 80 MCG/ACT  inhaler Formulary change   • beclomethasone (QVAR) 80 MCG/ACT inhaler Formulary change   • Beclomethasone Diprop HFA (QVAR REDIHALER) 40 MCG/ACT inhaler Reorder        Return in about 1 year (around 5/11/2019).    Dr. Luis Armando Kidd  [place], [place]

## 2018-06-14 ENCOUNTER — OFFICE VISIT (OUTPATIENT)
Dept: OBSTETRICS AND GYNECOLOGY | Facility: CLINIC | Age: 28
End: 2018-06-14

## 2018-06-14 VITALS
SYSTOLIC BLOOD PRESSURE: 130 MMHG | BODY MASS INDEX: 42.27 KG/M2 | WEIGHT: 263 LBS | HEIGHT: 66 IN | DIASTOLIC BLOOD PRESSURE: 76 MMHG

## 2018-06-14 DIAGNOSIS — R87.612 LGSIL ON PAP SMEAR OF CERVIX: ICD-10-CM

## 2018-06-14 DIAGNOSIS — Z11.51 SPECIAL SCREENING EXAMINATION FOR HUMAN PAPILLOMAVIRUS (HPV): ICD-10-CM

## 2018-06-14 DIAGNOSIS — Z01.419 PAP SMEAR, LOW-RISK: ICD-10-CM

## 2018-06-14 DIAGNOSIS — Z30.431 IUD CHECK UP: ICD-10-CM

## 2018-06-14 DIAGNOSIS — Z01.419 ENCOUNTER FOR GYNECOLOGICAL EXAMINATION WITHOUT ABNORMAL FINDING: ICD-10-CM

## 2018-06-14 DIAGNOSIS — Z13.9 SCREENING FOR CONDITION: Primary | ICD-10-CM

## 2018-06-14 LAB
B-HCG UR QL: NEGATIVE
BILIRUB BLD-MCNC: NEGATIVE MG/DL
CLARITY, POC: CLEAR
COLOR UR: YELLOW
GLUCOSE UR STRIP-MCNC: NEGATIVE MG/DL
INTERNAL NEGATIVE CONTROL: NEGATIVE
INTERNAL POSITIVE CONTROL: POSITIVE
KETONES UR QL: NEGATIVE
LEUKOCYTE EST, POC: NEGATIVE
Lab: NORMAL
NITRITE UR-MCNC: NEGATIVE MG/ML
PH UR: 5 [PH] (ref 5–8)
PROT UR STRIP-MCNC: NEGATIVE MG/DL
RBC # UR STRIP: NEGATIVE /UL
SP GR UR: 1.02 (ref 1–1.03)
UROBILINOGEN UR QL: NORMAL

## 2018-06-14 PROCEDURE — 81002 URINALYSIS NONAUTO W/O SCOPE: CPT | Performed by: OBSTETRICS & GYNECOLOGY

## 2018-06-14 PROCEDURE — 99395 PREV VISIT EST AGE 18-39: CPT | Performed by: OBSTETRICS & GYNECOLOGY

## 2018-06-14 PROCEDURE — 81025 URINE PREGNANCY TEST: CPT | Performed by: OBSTETRICS & GYNECOLOGY

## 2018-06-14 NOTE — PROGRESS NOTES
GYN Annual Exam     CC- Here for annual exam.     Paola Bowles is a 28 y.o. female established patient who presents for annual well woman exam. Periods are rare, lasting 2 days.  She had a Kyleena placed in 2017 and likes it so far. She is engaged and weill be getting  in 10/2018. No plans anytime soon for kids. She had an LGSIL pap in 2017 with CIERA 1 on Bx. Her pap in 2017 was normal.     OB History      Para Term  AB Living    0 0 0 0 0 0    SAB TAB Ectopic Molar Multiple Live Births    0 0 0   0          Obstetric Comments    No plans          Menarche: 13  Current contraception: IUD  History of abnormal Pap smear: yes - 2017 LGSIL/CIERA 1, 2017 normal  History of abnormal mammogram: no  Family history of uterine, colon or ovarian cancer: no  Family history of breast cancer: no  H/o STDs: none  Gardasil: declined    Health Maintenance   Topic Date Due   • ANNUAL PHYSICAL  1993   • INFLUENZA VACCINE  2018   • PAP SMEAR  2020   • TDAP/TD VACCINES (2 - Td) 2028       Past Medical History:   Diagnosis Date   • Abnormal Pap smear of cervix     2017 LGSIL/CIERA 1, 2017 normal   • Asthma    • Hypertension    • Menstrual problem        Past Surgical History:   Procedure Laterality Date   • WISDOM TOOTH EXTRACTION     • WISDOM TOOTH EXTRACTION           Current Outpatient Prescriptions:   •  albuterol (PROVENTIL HFA;VENTOLIN HFA) 108 (90 BASE) MCG/ACT inhaler, Inhale 2 puffs every 4 (four) hours as needed., Disp: , Rfl:   •  Beclomethasone Diprop HFA (QVAR REDIHALER) 40 MCG/ACT inhaler, Inhale 2 puffs 2 (Two) Times a Day., Disp: 1 inhaler, Rfl: 11  •  cetirizine (ZyrTEC) 10 MG tablet, Take 10 mg by mouth daily., Disp: , Rfl:   •  montelukast (SINGULAIR) 10 MG tablet, TAKE 1 TABLET BY MOUTH DAILY AT BEDTIME, Disp: , Rfl: 1  •  QVAR 40 MCG/ACT inhaler, INHALE 2 PUFFS 2 (TWO) TIMES A DAY., Disp: 8.7 g, Rfl: 2    Allergies   Allergen Reactions   • Penicillins    •  "Sulfa Antibiotics        Social History   Substance Use Topics   • Smoking status: Never Smoker   • Smokeless tobacco: Never Used   • Alcohol use 0.6 oz/week     1 Glasses of wine per week       Family History   Problem Relation Age of Onset   • Thyroid disease Mother         Graves   • Ulcerative colitis Mother    • Hypertension Father    • Hearing loss Father    • Cancer Maternal Grandmother    • Asthma Paternal Grandmother    • Hearing loss Paternal Grandmother    • Heart disease Paternal Grandfather    • Diabetes Paternal Grandfather    • Cancer Paternal Grandfather    • No Known Problems Sister    • Ulcerative colitis Brother    • Breast cancer Neg Hx    • Ovarian cancer Neg Hx    • Uterine cancer Neg Hx    • Colon cancer Neg Hx    • Pulmonary embolism Neg Hx    • Deep vein thrombosis Neg Hx        Review of Systems   Constitutional: Negative for appetite change, fatigue, fever and unexpected weight change.   Respiratory: Negative for cough and shortness of breath.    Cardiovascular: Negative for chest pain and palpitations.   Gastrointestinal: Negative for abdominal distention, abdominal pain, constipation, diarrhea and nausea.   Genitourinary: Negative for dyspareunia, dysuria, menstrual problem, pelvic pain and vaginal discharge.   Skin: Negative for color change and rash.   Neurological: Negative for headaches.   Psychiatric/Behavioral: Negative for dysphoric mood. The patient is not nervous/anxious.        /76   Ht 167.6 cm (66\")   Wt 119 kg (263 lb)   LMP 05/31/2018   BMI 42.45 kg/m²     Physical Exam   Constitutional: She is oriented to person, place, and time. She appears well-developed and well-nourished.   HENT:   Head: Normocephalic and atraumatic.   Neck: Neck supple. No thyromegaly present.   Cardiovascular: Normal rate, regular rhythm and normal heart sounds.    Pulmonary/Chest: Effort normal and breath sounds normal. Right breast exhibits no inverted nipple, no mass, no nipple " discharge, no skin change and no tenderness. Left breast exhibits no inverted nipple, no mass, no nipple discharge, no skin change and no tenderness.   Abdominal: Soft. Bowel sounds are normal. She exhibits no distension and no mass. There is no tenderness. There is no rebound and no guarding. No hernia.   Genitourinary: Uterus normal. Pelvic exam was performed with patient supine. There is no rash, tenderness or lesion on the right labia. There is no rash, tenderness or lesion on the left labia. Cervix exhibits no motion tenderness, no discharge and no friability. Right adnexum displays no mass, no tenderness and no fullness. Left adnexum displays no mass, no tenderness and no fullness. No erythema, tenderness or bleeding in the vagina. No foreign body in the vagina. No signs of injury around the vagina. No vaginal discharge found.   Genitourinary Comments: IUD string seen easily   Neurological: She is alert and oriented to person, place, and time.   Skin: Skin is warm and dry.   Psychiatric: She has a normal mood and affect. Her behavior is normal. Judgment and thought content normal.   Nursing note and vitals reviewed.         Assessment/Plan    1) GYN HM: check pap, if normal we can return to yearly screening.    SBE demonstrated and encouraged.  2) STD screening: declines Condoms encouraged.  3) Contraception:  Kyleena IUD   4) Family Planning: no plans, encourage folic acid daily  5) Diet and Exercise discussed  6) Smoking Status: non smoker  7) Social: works at [employer], engaged.   8) MMG- plan age 40 .   9)Follow up prn or 1 year       Paola was seen today for gynecologic exam.    Diagnoses and all orders for this visit:    Screening for condition  -     POC Urinalysis Dipstick  -     POC Pregnancy, Urine    Special screening examination for human papillomavirus (HPV)  -     Pap IG, Rfx HPV ASCU    Pap smear, low-risk  -     Pap IG, Rfx HPV ASCU          Tanja Jeter MD  6/14/18  7:40 PM

## 2018-06-15 PROBLEM — Z30.011 ORAL CONTRACEPTION INITIATION: Status: RESOLVED | Noted: 2017-11-03 | Resolved: 2018-06-15

## 2018-06-15 PROBLEM — Z00.00 ROUTINE ADULT HEALTH MAINTENANCE: Status: RESOLVED | Noted: 2017-02-24 | Resolved: 2018-06-15

## 2018-06-20 ENCOUNTER — TELEPHONE (OUTPATIENT)
Dept: OBSTETRICS AND GYNECOLOGY | Facility: CLINIC | Age: 28
End: 2018-06-20

## 2018-06-30 NOTE — TELEPHONE ENCOUNTER
She has had a LGSIL pap recently and had a colpo with bx and CIERA 1. Her last pap was normal. At this point I recommend repeat pap in 6 months. Please place in colpo recall.

## 2018-07-23 ENCOUNTER — OFFICE VISIT (OUTPATIENT)
Dept: FAMILY MEDICINE CLINIC | Facility: CLINIC | Age: 28
End: 2018-07-23

## 2018-07-23 ENCOUNTER — HOSPITAL ENCOUNTER (OUTPATIENT)
Dept: GENERAL RADIOLOGY | Facility: HOSPITAL | Age: 28
Discharge: HOME | End: 2018-07-23
Admitting: NURSE PRACTITIONER

## 2018-07-23 VITALS
TEMPERATURE: 98.1 F | HEIGHT: 66 IN | RESPIRATION RATE: 16 BRPM | DIASTOLIC BLOOD PRESSURE: 90 MMHG | OXYGEN SATURATION: 99 % | WEIGHT: 264 LBS | HEART RATE: 90 BPM | BODY MASS INDEX: 42.43 KG/M2 | SYSTOLIC BLOOD PRESSURE: 140 MMHG

## 2018-07-23 DIAGNOSIS — M79.672 LEFT FOOT PAIN: Primary | ICD-10-CM

## 2018-07-23 DIAGNOSIS — M79.672 LEFT FOOT PAIN: ICD-10-CM

## 2018-07-23 PROCEDURE — 73630 X-RAY EXAM OF FOOT: CPT

## 2018-07-23 PROCEDURE — 99213 OFFICE O/P EST LOW 20 MIN: CPT | Performed by: NURSE PRACTITIONER

## 2018-07-23 RX ORDER — IBUPROFEN 600 MG/1
600 TABLET ORAL EVERY 8 HOURS PRN
COMMUNITY
Start: 2018-07-23 | End: 2019-05-10

## 2018-07-23 NOTE — PROGRESS NOTES
Notify Paola Bowles her foot xray shows no fracture or dislocation.  Continue current recommendations and notify us if symptoms do not improve.

## 2018-07-23 NOTE — PATIENT INSTRUCTIONS
Elastic Bandage and RICE  What does an elastic bandage do?  Elastic bandages come in different shapes and sizes. They generally provide support to your injury and reduce swelling while you are healing, but they can perform different functions. Your health care provider will help you to decide what is best for your protection, recovery, or rehabilitation following an injury.  What are some general tips for using an elastic bandage?  · Use the bandage as directed by the maker of the bandage that you are using.  · Do not wrap the bandage too tightly. This may cut off the circulation in the arm or leg in the area below the bandage.  ? If part of your body beyond the bandage becomes blue, numb, cold, swollen, or is more painful, your bandage is most likely too tight. If this occurs, remove your bandage and reapply it more loosely.  · See your health care provider if the bandage seems to be making your problems worse rather than better.  · An elastic bandage should be removed and reapplied every 3-4 hours or as directed by your health care provider.  What is RICE?  The routine care of many injuries includes rest, ice, compression, and elevation (RICE therapy).  Rest  Rest is required to allow your body to heal. Generally, you can resume your routine activities when you are comfortable and have been given permission by your health care provider.  Ice  Icing your injury helps to keep the swelling down and it reduces pain. Do not apply ice directly to your skin.  · Put ice in a plastic bag.  · Place a towel between your skin and the bag.  · Leave the ice on for 20 minutes, 2-3 times per day.    Do this for as long as you are directed by your health care provider.  Compression  Compression helps to keep swelling down, gives support, and helps with discomfort. Compression may be done with an elastic bandage.  Elevation  Elevation helps to reduce swelling and it decreases pain. If possible, your injured area should be placed at  or above the level of your heart or the center of your chest.  When should I seek medical care?  You should seek medical care if:  · You have persistent pain and swelling.  · Your symptoms are getting worse rather than improving.    These symptoms may indicate that further evaluation or further X-rays are needed. Sometimes, X-rays may not show a small broken bone (fracture) until a number of days later. Make a follow-up appointment with your health care provider. Ask when your X-ray results will be ready. Make sure that you get your X-ray results.  When should I seek immediate medical care?  You should seek immediate medical care if:  · You have a sudden onset of severe pain at or below the area of your injury.  · You develop redness or increased swelling around your injury.  · You have tingling or numbness at or below the area of your injury that does not improve after you remove the elastic bandage.    This information is not intended to replace advice given to you by your health care provider. Make sure you discuss any questions you have with your health care provider.  Document Released: 06/09/2003 Document Revised: 11/13/2017 Document Reviewed: 08/03/2015  Elsevier Interactive Patient Education © 2018 [employer]

## 2018-07-23 NOTE — PROGRESS NOTES
"Subjective   Paola Bowles is a 28 y.o. female who presents with left foot pain. Onset of the symptoms was about a month ago. Precipitating event: dropped a mat onto foot and has had pain ever since.  . Current symptoms include: ability to bear weight, but with some pain. Aggravating factors: any weight bearing, going up and down stairs and rising after sitting. Symptoms have waxed and waned. Patient has had no prior foot problems. Evaluation to date: none. Treatment to date: ice and OTC analgesics which are not very effective.    The following portions of the patient's history were reviewed and updated as appropriate: past family history, past social history, past surgical history and problem list.    Review of Systems:  A comprehensive review of systems was negative except for: Musculoskeletal: positive for left foot pain     Objective    /90 (BP Location: Left arm, Patient Position: Sitting, Cuff Size: Large Adult)   Pulse 90   Temp 98.1 °F (36.7 °C)   Resp 16   Ht 167.6 cm (65.98\")   Wt 120 kg (264 lb)   SpO2 99%   BMI 42.63 kg/m²   Right foot:  normal exam, no swelling, tenderness, instability; ligaments intact, full range of motion of all ankle/foot joints   Left foot:  tenderness to dorsal surface of left foot.  No redness, bruising, or swelling. Increase pain with moving left great toe.     Imaging:  X-ray of the left foot: ordered, but results not yet available     Assessment/Plan   Non-specific foot pain     Rest, ice, compression, and elevation (RICE) therapy.  OTC analgesics as needed.  Follow up in 1 week.if no improvement.      Tanja Armstrong, APRN              "

## 2018-08-17 DIAGNOSIS — J45.30 MILD PERSISTENT ASTHMA WITHOUT COMPLICATION: ICD-10-CM

## 2018-09-19 ENCOUNTER — OFFICE VISIT (OUTPATIENT)
Dept: FAMILY MEDICINE CLINIC | Facility: CLINIC | Age: 28
End: 2018-09-19

## 2018-09-19 VITALS
WEIGHT: 262 LBS | HEIGHT: 66 IN | TEMPERATURE: 98.3 F | SYSTOLIC BLOOD PRESSURE: 126 MMHG | OXYGEN SATURATION: 98 % | HEART RATE: 86 BPM | BODY MASS INDEX: 42.11 KG/M2 | DIASTOLIC BLOOD PRESSURE: 82 MMHG

## 2018-09-19 DIAGNOSIS — Z91.09 ENVIRONMENTAL ALLERGIES: Primary | ICD-10-CM

## 2018-09-19 DIAGNOSIS — J01.10 ACUTE NON-RECURRENT FRONTAL SINUSITIS: ICD-10-CM

## 2018-09-19 PROCEDURE — 99213 OFFICE O/P EST LOW 20 MIN: CPT | Performed by: FAMILY MEDICINE

## 2018-09-19 RX ORDER — AZITHROMYCIN 250 MG/1
TABLET, FILM COATED ORAL
Qty: 6 TABLET | Refills: 0 | OUTPATIENT
Start: 2018-09-19 | End: 2019-01-13

## 2018-09-19 RX ORDER — FLUCONAZOLE 150 MG/1
150 TABLET ORAL DAILY
Qty: 3 TABLET | Refills: 0 | OUTPATIENT
Start: 2018-09-19 | End: 2019-01-13

## 2018-09-19 NOTE — PROGRESS NOTES
"  Chief Complaint   Patient presents with   • Headache     x's a week    • Fever       Upper Respiratory Infection: Patient complains of symptoms of a URI, possible sinusitis. Symptoms include congestion. Onset of symptoms was 6 weeks ago, gradually worsening since that time. She also c/o congestion, facial pain, nasal congestion, post nasal drip, sinus pressure and sore throat for the past 1 week .  She is drinking plenty of fluids. Evaluation to date: none. Treatment to date: antihistamines and cough suppressants.  Ill contacts at home or school or work discussed.  Usually Fall allergy in flair for > 1 month  Then last week had frontal headache and fever      Vitals:    09/19/18 1429   BP: 126/82   Pulse: 86   Temp: 98.3 °F (36.8 °C)   TempSrc: Oral   SpO2: 98%   Weight: 119 kg (262 lb)   Height: 167.6 cm (66\")     Gen: mildly ill appearing, alert  Ears: Tm's bulging without redness  Nose:  Congestion  Throat:  Red without exudate, some drainage, tonsils okay  Neck: no LAD  Lung:  good air movement, regular RR  Heart: RR without murmur  Skin: no rash.      Assessment/Plan   Paola was seen today for headache and fever.    Diagnoses and all orders for this visit:    Environmental allergies  -     azithromycin (ZITHROMAX) 250 MG tablet; Take 2 tablets the first day, then 1 tablet daily for 4 days.    Acute non-recurrent frontal sinusitis  -     azithromycin (ZITHROMAX) 250 MG tablet; Take 2 tablets the first day, then 1 tablet daily for 4 days.    Other orders  -     fluconazole (DIFLUCAN) 150 MG tablet; Take 1 tablet by mouth Daily.             There are no Patient Instructions on file for this visit.    Tylenol or Advil as needed for pain, fever, muscle aches  Plenty of fluids  Hand washing discussed  Off work or school note given if needed.  Warm tea for throat.  Pros and cons of antibiotic use discussed    Dr. Luis Armando Kidd MD  [place]  [facility]  "

## 2018-12-05 ENCOUNTER — OFFICE VISIT (OUTPATIENT)
Dept: OBSTETRICS AND GYNECOLOGY | Facility: CLINIC | Age: 28
End: 2018-12-05

## 2018-12-05 ENCOUNTER — PROCEDURE VISIT (OUTPATIENT)
Dept: OBSTETRICS AND GYNECOLOGY | Facility: CLINIC | Age: 28
End: 2018-12-05

## 2018-12-05 VITALS
SYSTOLIC BLOOD PRESSURE: 142 MMHG | HEIGHT: 66 IN | WEIGHT: 210.2 LBS | DIASTOLIC BLOOD PRESSURE: 88 MMHG | BODY MASS INDEX: 33.78 KG/M2

## 2018-12-05 DIAGNOSIS — R87.612 LGSIL ON PAP SMEAR OF CERVIX: ICD-10-CM

## 2018-12-05 DIAGNOSIS — R10.2 PELVIC CRAMPING: ICD-10-CM

## 2018-12-05 DIAGNOSIS — R87.612 LOW GRADE SQUAMOUS INTRAEPITHELIAL LESION ON CYTOLOGIC SMEAR OF CERVIX (LGSIL): ICD-10-CM

## 2018-12-05 DIAGNOSIS — T83.32XA MALPOSITIONED INTRAUTERINE DEVICE (IUD), INITIAL ENCOUNTER: Primary | ICD-10-CM

## 2018-12-05 DIAGNOSIS — Z13.9 SCREENING FOR CONDITION: Primary | ICD-10-CM

## 2018-12-05 LAB
B-HCG UR QL: NEGATIVE
BILIRUB BLD-MCNC: NEGATIVE MG/DL
CLARITY, POC: CLEAR
COLOR UR: YELLOW
GLUCOSE UR STRIP-MCNC: NEGATIVE MG/DL
INTERNAL NEGATIVE CONTROL: NEGATIVE
INTERNAL POSITIVE CONTROL: POSITIVE
KETONES UR QL: NEGATIVE
LEUKOCYTE EST, POC: NEGATIVE
Lab: NORMAL
NITRITE UR-MCNC: NEGATIVE MG/ML
PH UR: 5 [PH] (ref 5–8)
PROT UR STRIP-MCNC: NEGATIVE MG/DL
RBC # UR STRIP: NEGATIVE /UL
SP GR UR: 1 (ref 1–1.03)
UROBILINOGEN UR QL: NORMAL

## 2018-12-05 PROCEDURE — 81025 URINE PREGNANCY TEST: CPT | Performed by: OBSTETRICS & GYNECOLOGY

## 2018-12-05 PROCEDURE — 76830 TRANSVAGINAL US NON-OB: CPT | Performed by: OBSTETRICS & GYNECOLOGY

## 2018-12-05 PROCEDURE — 99213 OFFICE O/P EST LOW 20 MIN: CPT | Performed by: OBSTETRICS & GYNECOLOGY

## 2018-12-05 PROCEDURE — 81002 URINALYSIS NONAUTO W/O SCOPE: CPT | Performed by: OBSTETRICS & GYNECOLOGY

## 2018-12-05 NOTE — PROGRESS NOTES
"      Paola Bowles is a 28 y.o. patient who presents for follow up of   Chief Complaint   Patient presents with   • Follow-up     repeat pap smear     27 yo est pt here for 6 month f/u pap.  In 5/2017 she had a LGSIL pap with CIERA 1 on biopsy. She had a repeat pap in 11/2017 that was normal and pap in 6/2018 was again LGSIL. She was  in October and it went well. She has a Kyleena IUD placed in 6/2017. She has noticed an increase in pelvic cramping in the past few weeks.         The following portions of the patient's history were reviewed and updated as appropriate: allergies, current medications and problem list.    Review of Systems   Constitutional: Negative for appetite change, fatigue, fever and unexpected weight change.   Respiratory: Negative for cough and shortness of breath.    Cardiovascular: Negative for chest pain and palpitations.   Gastrointestinal: Negative for abdominal distention, abdominal pain, constipation, diarrhea and nausea.   Genitourinary: Positive for pelvic pain (cramping). Negative for dyspareunia, dysuria, menstrual problem and vaginal discharge.   Skin: Negative for color change and rash.   Neurological: Negative for headaches.   Psychiatric/Behavioral: Negative for dysphoric mood. The patient is not nervous/anxious.        /88   Ht 167.6 cm (66\")   Wt 95.3 kg (210 lb 3.2 oz)   BMI 33.93 kg/m²     Physical Exam   Constitutional: She is oriented to person, place, and time. She appears well-developed and well-nourished.   HENT:   Head: Normocephalic and atraumatic.   Eyes: Conjunctivae are normal. No scleral icterus.   Neck: No JVD present.   Abdominal: Soft. Bowel sounds are normal. She exhibits no distension and no mass. There is no tenderness. There is no rebound and no guarding. No hernia.   Genitourinary: Uterus normal. Pelvic exam was performed with patient supine. There is no rash, tenderness, lesion or injury on the right labia. There is no rash, tenderness, " lesion or injury on the left labia. Cervix exhibits no motion tenderness, no discharge and no friability. Right adnexum displays no mass, no tenderness and no fullness. Left adnexum displays no mass, no tenderness and no fullness. No erythema, tenderness or bleeding in the vagina. No foreign body in the vagina. No signs of injury around the vagina. No vaginal discharge found.   Genitourinary Comments: IUD string seen easily.    Neurological: She is alert and oriented to person, place, and time.   Skin: Skin is warm and dry.   Psychiatric: She has a normal mood and affect. Her behavior is normal. Judgment and thought content normal.   Nursing note and vitals reviewed.    A/P:  1. H/O abnormal pap- recheck pap today.   2. CS- Kyleena IUD.   3. Pelvic cramping- US today shows 6.32 cm uterus and EL 0.57 cm with normal ovaries. Her IUD is in the endometrium but appears to be in the lower half of the uterus. We discussed that she may be in the process of cramping it out but since her symptoms just started and she wants to keep her IUD, I recommend we watch and see. If she continues to have cramping then she will need another US to make sure it is not in the cervix. She will call us with any changes.   4. RTO 6/2019 annual and repeat pap.       Assessment/Plan   Paola was seen today for follow-up.    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      Tanja Jeter MD    12/5/18  3:51 PM

## 2018-12-11 LAB
CONV .: ABNORMAL
CYTOLOGIST CVX/VAG CYTO: ABNORMAL
CYTOLOGY CVX/VAG DOC THIN PREP: ABNORMAL
DX ICD CODE: ABNORMAL
DX ICD CODE: ABNORMAL
HIV 1 & 2 AB SER-IMP: ABNORMAL
HPV I/H RISK 1 DNA CVX QL PROBE+SIG AMP: POSITIVE
OTHER STN SPEC: ABNORMAL
PATH REPORT.FINAL DX SPEC: ABNORMAL
PATHOLOGIST CVX/VAG CYTO: ABNORMAL
RECOM F/U CVX/VAG CYTO: ABNORMAL
STAT OF ADQ CVX/VAG CYTO-IMP: ABNORMAL

## 2019-01-09 ENCOUNTER — OFFICE VISIT (OUTPATIENT)
Dept: OBSTETRICS AND GYNECOLOGY | Facility: CLINIC | Age: 29
End: 2019-01-09

## 2019-01-09 VITALS
WEIGHT: 270 LBS | DIASTOLIC BLOOD PRESSURE: 82 MMHG | HEIGHT: 66 IN | SYSTOLIC BLOOD PRESSURE: 148 MMHG | BODY MASS INDEX: 43.39 KG/M2

## 2019-01-09 DIAGNOSIS — Z31.69 ENCOUNTER FOR PRECONCEPTION CONSULTATION: ICD-10-CM

## 2019-01-09 DIAGNOSIS — Z30.432 ENCOUNTER FOR IUD REMOVAL: ICD-10-CM

## 2019-01-09 DIAGNOSIS — R87.610 ATYPICAL SQUAMOUS CELLS OF UNDETERMINED SIGNIFICANCE ON CYTOLOGIC SMEAR OF CERVIX (ASC-US): ICD-10-CM

## 2019-01-09 DIAGNOSIS — Z13.9 SCREENING FOR CONDITION: Primary | ICD-10-CM

## 2019-01-09 LAB
B-HCG UR QL: NEGATIVE
BILIRUB BLD-MCNC: NEGATIVE MG/DL
CLARITY, POC: CLEAR
COLOR UR: YELLOW
GLUCOSE UR STRIP-MCNC: NEGATIVE MG/DL
INTERNAL NEGATIVE CONTROL: NEGATIVE
INTERNAL POSITIVE CONTROL: POSITIVE
KETONES UR QL: NEGATIVE
LEUKOCYTE EST, POC: NEGATIVE
Lab: NORMAL
NITRITE UR-MCNC: NEGATIVE MG/ML
PH UR: 5 [PH] (ref 5–8)
PROT UR STRIP-MCNC: NEGATIVE MG/DL
RBC # UR STRIP: ABNORMAL /UL
SP GR UR: 1 (ref 1–1.03)
UROBILINOGEN UR QL: NORMAL

## 2019-01-09 PROCEDURE — 58301 REMOVE INTRAUTERINE DEVICE: CPT | Performed by: OBSTETRICS & GYNECOLOGY

## 2019-01-09 PROCEDURE — 57454 BX/CURETT OF CERVIX W/SCOPE: CPT | Performed by: OBSTETRICS & GYNECOLOGY

## 2019-01-09 PROCEDURE — 99213 OFFICE O/P EST LOW 20 MIN: CPT | Performed by: OBSTETRICS & GYNECOLOGY

## 2019-01-09 PROCEDURE — 81025 URINE PREGNANCY TEST: CPT | Performed by: OBSTETRICS & GYNECOLOGY

## 2019-01-09 PROCEDURE — 81002 URINALYSIS NONAUTO W/O SCOPE: CPT | Performed by: OBSTETRICS & GYNECOLOGY

## 2019-01-09 NOTE — PROGRESS NOTES
"      Paola Bowles is a 28 y.o. patient who presents for follow up of   Chief Complaint   Patient presents with   • Procedure     colposcopy     27 yo est pt here for colpo. She had the following paps: 5/2017- LGSIL with CIERA 1 on bx, 11/2017- normal pap, 6/2018  LGSIL. And 12/2018 ASCUS + HPV. She is cramping and she would like her IUD out and they would like to start trying to get pregnant.         The following portions of the patient's history were reviewed and updated as appropriate: allergies, current medications and problem list.    Review of Systems   Genitourinary: Positive for menstrual problem and pelvic pain.   All other systems reviewed and are negative.      /82   Ht 167.6 cm (66\")   Wt 122 kg (270 lb)   BMI 43.58 kg/m²     Physical Exam   Constitutional: She is oriented to person, place, and time. She appears well-developed and well-nourished.   HENT:   Head: Normocephalic and atraumatic.   Abdominal: Soft. Bowel sounds are normal. She exhibits no distension and no mass. There is no tenderness. There is no rebound and no guarding. No hernia.   Genitourinary: Uterus normal. Pelvic exam was performed with patient supine. There is no rash, tenderness, lesion or injury on the right labia. There is no rash, tenderness, lesion or injury on the left labia. Cervix exhibits no motion tenderness, no discharge and no friability. Right adnexum displays no mass, no tenderness and no fullness. Left adnexum displays no mass, no tenderness and no fullness. No erythema, tenderness or bleeding in the vagina. No foreign body in the vagina. No signs of injury around the vagina. No vaginal discharge found.   Genitourinary Comments: SEE COLPO and IUD removal notes   Neurological: She is alert and oriented to person, place, and time.   Skin: Skin is warm and dry.   Psychiatric: She has a normal mood and affect. Her behavior is normal. Judgment and thought content normal.   Nursing note and vitals " reviewed.    A/P:  1. ASCUS + HPV- s/p colpo and bx. Colpo impression CIERA 1. Will call with results and followup.   2. CS- IUD removal.   3. Preconceptual counseling- We discussed a healthy lifestyle before pregnancy, including avoidance of tobacco, alcohol and drugs.  We reviewed her prescription medications.  She does not have any hereditary disease or birth defects in her or her partner’s family.  I recommend she get a flu shot yearly and take folic acid daily.  She was offered testing for a type and screen and a rubella IgG.  She was also offered pre conceptual screening for SMA, FX and CF and did not accept. She was advised to call if her cycles are not regular in the next 3 months. High risk for PCOS. Enc weight loss.     Assessment/Plan   Paola was seen today for procedure.    Diagnoses and all orders for this visit:    Screening for condition  -     POC Urinalysis Dipstick  -     POC Pregnancy, Urine    Encounter for preconception consultation  -     Rubella Antibody, IgG  -     ABO / Rh  -     Antibody Screen    Atypical squamous cells of undetermined significance on cytologic smear of cervix (ASC-US)  -     Reference Histopathology                   No Follow-up on file.      Tanja Jeter MD    1/9/19  2:55 PM

## 2019-01-09 NOTE — PROGRESS NOTES
Colposcopy Procedure Note    Procedures          Indications: Most recent Pap smear showed: ASCUS with POSITIVE high risk HPV.  Prior cervical/vaginal disease: CIERA 1.  Prior cervical treatment: no treatment.    Procedure Details   The risks and benefits of the procedure and Verbal informed consent obtained.  Pregnancy test: negative    Speculum placed in vagina and excellent visualization of cervix achieved, cervix swabbed x 3 with acetic acid solution and Lugol's solution     Findings:  Cervix: acetowhite lesion(s) noted at 11 & 5 o'clock; cervix swabbed with Lugol's solution, endocervical curettage performed, cervical biopsies taken at 11 & 5 o'clock, specimen labelled and sent to pathology and hemostasis achieved with Monsel's solution.  Vaginal inspection: vaginal colposcopy not performed.  Vulvar colposcopy: vulvar colposcopy not performed.    Specimens: bx 11 , 5 and ECC    Complications: none.    Plan:  Specimens labelled and sent to Pathology.  Will base further treatment on Pathology findings.    Tanja Jeter MD   1/9/2019  1:16 PM

## 2019-01-09 NOTE — PROGRESS NOTES
IUD Removal Procedure Note    Type of IUD:  Kyleena  Date of insertion:  known  Reason for removal:  Desires pregnancy  Other relevant history/information:  none    Procedure Time Out Documentation      Procedure Details  IUD strings visible:  yes  Local anesthesia:  None  Tenaculum used:  None  Removal:  IUD strings grasped and IUD removed intact with gentle traction.  The patient tolerated the procedure well.    All appropriate instructions regarding removal were reviewed.    Tolerated well  No apparent complications  Post procedure diagnosis : IUD removal     Plans for contraception:  no method    Other follow-up needed:  none    The patient was advised to call for any fever or for prolonged or severe pain or bleeding. She was advised to use OTC ibuprofen as needed for mild to moderate pain.       Tanja Jeter MD    1/9/2019  1:31 PM

## 2019-01-10 LAB
ABO GROUP BLD: NORMAL
BLD GP AB SCN SERPL QL: NEGATIVE
RH BLD: POSITIVE
RUBV IGG SERPL IA-ACNC: 1.17 INDEX

## 2019-01-13 PROBLEM — Z30.431 IUD CHECK UP: Status: RESOLVED | Noted: 2017-06-08 | Resolved: 2019-01-13

## 2019-01-13 NOTE — PROGRESS NOTES
PIP= cervical biopsies show low grade changes ( CIERA 1). Repeat pap in 6 months. Please place in colpo recall.

## 2019-04-29 ENCOUNTER — TELEPHONE (OUTPATIENT)
Dept: FAMILY MEDICINE CLINIC | Facility: CLINIC | Age: 29
End: 2019-04-29

## 2019-04-29 DIAGNOSIS — I10 ESSENTIAL HYPERTENSION: Primary | ICD-10-CM

## 2019-04-29 DIAGNOSIS — Z00.00 ROUTINE ADULT HEALTH MAINTENANCE: ICD-10-CM

## 2019-04-29 NOTE — TELEPHONE ENCOUNTER
Patient has a physical schedule on 5- want to do fasting labs on 5/3/2019/ Thank you.     Ok labs entered

## 2019-05-02 DIAGNOSIS — I10 ESSENTIAL HYPERTENSION: ICD-10-CM

## 2019-05-02 DIAGNOSIS — Z00.00 ROUTINE ADULT HEALTH MAINTENANCE: ICD-10-CM

## 2019-05-03 LAB
ALBUMIN SERPL-MCNC: 4.3 G/DL (ref 3.5–5.2)
ALBUMIN/GLOB SERPL: 1.5 G/DL
ALP SERPL-CCNC: 76 U/L (ref 39–117)
ALT SERPL-CCNC: 26 U/L (ref 1–33)
APPEARANCE UR: CLEAR
AST SERPL-CCNC: 21 U/L (ref 1–32)
BILIRUB SERPL-MCNC: 0.5 MG/DL (ref 0.2–1.2)
BILIRUB UR QL STRIP: NEGATIVE
BUN SERPL-MCNC: 12 MG/DL (ref 6–20)
BUN/CREAT SERPL: 19.4 (ref 7–25)
CALCIUM SERPL-MCNC: 9.5 MG/DL (ref 8.6–10.5)
CHLORIDE SERPL-SCNC: 102 MMOL/L (ref 98–107)
CHOLEST SERPL-MCNC: 172 MG/DL (ref 0–200)
CO2 SERPL-SCNC: 26.9 MMOL/L (ref 22–29)
COLOR UR: YELLOW
CREAT SERPL-MCNC: 0.62 MG/DL (ref 0.57–1)
ERYTHROCYTE [DISTWIDTH] IN BLOOD BY AUTOMATED COUNT: 12.5 % (ref 12.3–15.4)
GLOBULIN SER CALC-MCNC: 2.8 GM/DL
GLUCOSE SERPL-MCNC: 91 MG/DL (ref 65–99)
GLUCOSE UR QL: NEGATIVE
HCT VFR BLD AUTO: 42.4 % (ref 34–46.6)
HDLC SERPL-MCNC: 51 MG/DL (ref 40–60)
HGB BLD-MCNC: 13.5 G/DL (ref 12–15.9)
HGB UR QL STRIP: NEGATIVE
KETONES UR QL STRIP: NEGATIVE
LDLC SERPL CALC-MCNC: 99 MG/DL (ref 0–100)
LEUKOCYTE ESTERASE UR QL STRIP: NEGATIVE
MCH RBC QN AUTO: 28.7 PG (ref 26.6–33)
MCHC RBC AUTO-ENTMCNC: 31.8 G/DL (ref 31.5–35.7)
MCV RBC AUTO: 90.2 FL (ref 79–97)
NITRITE UR QL STRIP: NEGATIVE
PH UR STRIP: 7 [PH] (ref 5–8)
PLATELET # BLD AUTO: 241 10*3/MM3 (ref 140–450)
POTASSIUM SERPL-SCNC: 4.3 MMOL/L (ref 3.5–5.2)
PROT SERPL-MCNC: 7.1 G/DL (ref 6–8.5)
PROT UR QL STRIP: NEGATIVE
RBC # BLD AUTO: 4.7 10*6/MM3 (ref 3.77–5.28)
SODIUM SERPL-SCNC: 140 MMOL/L (ref 136–145)
SP GR UR: 1.02 (ref 1–1.03)
TRIGL SERPL-MCNC: 108 MG/DL (ref 0–150)
TSH SERPL DL<=0.005 MIU/L-ACNC: 0.84 MIU/ML (ref 0.27–4.2)
UROBILINOGEN UR STRIP-MCNC: NORMAL MG/DL
VLDLC SERPL CALC-MCNC: 21.6 MG/DL
WBC # BLD AUTO: 8.81 10*3/MM3 (ref 3.4–10.8)

## 2019-05-10 ENCOUNTER — OFFICE VISIT (OUTPATIENT)
Dept: FAMILY MEDICINE CLINIC | Facility: CLINIC | Age: 29
End: 2019-05-10

## 2019-05-10 VITALS
HEIGHT: 66 IN | BODY MASS INDEX: 44.36 KG/M2 | SYSTOLIC BLOOD PRESSURE: 132 MMHG | WEIGHT: 276 LBS | OXYGEN SATURATION: 98 % | DIASTOLIC BLOOD PRESSURE: 86 MMHG | HEART RATE: 81 BPM

## 2019-05-10 DIAGNOSIS — J45.30 MILD PERSISTENT ASTHMA WITHOUT COMPLICATION: Primary | ICD-10-CM

## 2019-05-10 DIAGNOSIS — Z00.00 ROUTINE ADULT HEALTH MAINTENANCE: ICD-10-CM

## 2019-05-10 PROBLEM — T50.905A WEIGHT GAIN DUE TO MEDICATION: Status: RESOLVED | Noted: 2017-02-24 | Resolved: 2019-05-10

## 2019-05-10 PROBLEM — R63.5 WEIGHT GAIN DUE TO MEDICATION: Status: RESOLVED | Noted: 2017-02-24 | Resolved: 2019-05-10

## 2019-05-10 PROCEDURE — 99395 PREV VISIT EST AGE 18-39: CPT | Performed by: FAMILY MEDICINE

## 2019-05-10 NOTE — PROGRESS NOTES
"  Chief Complaint   Patient presents with   • Annual Exam   • Rash     Bumps on Chest and Neck       Subjective   Paola Neves is a 29 y.o. female and is here for a yearly physical exam. The patient reports problems - asthma well controlled. no flairs. newly  ang trying to have a baby.    Do you take any herbs or supplements that were not prescribed by a doctor? yes. If so, these will be added to active medication list.    The following portions of the patient's history were reviewed and updated as appropriate: allergies, current medications, past family history, past medical history, past social history, past surgical history and problem list.    Social and Family and Surgical History reviewed and updated today, see Rooming tab.    Health History, Preventive Measures and Vaccination flow sheets reviewed and updated today.    Patient's current medical chart in Epic; including previous office notes, imaging, labs, specialist's evaluation either in notes or in Media tab reviewed today.    Other pertinent medical information also reviewed thru Care Everywhere function is also reviewed today.    Review of Systems  Review of Systems  A comprehensive review of systems was negative.    Vitals:    05/10/19 1008   BP: 132/86   Pulse: 81   SpO2: 98%   Weight: 125 kg (276 lb)   Height: 167.6 cm (66\")       General Appearance:  Alert, cooperative, no distress, appears stated age   Head:  Normocephalic, without obvious abnormality, atraumatic   Eyes:  PERRL, conjunctiva/corneas clear, EOM's intact.   Ears:  Normal TM's and external ear canals, both ears   Nose: Nares normal, septum midline, mucosa normal, no drainage or sinus tenderness   Throat: Lips, mucosa, and tongue normal; teeth and gums normal   Neck: Supple, symmetrical, trachea midline, no adenopathy;   thyroid: No enlargement/tenderness/nodules; no carotid  bruit   Back:  Symmetric, no curvature, ROM normal, no CVA tenderness   Lungs:  Clear to auscultation " bilaterally, respirations unlabored   Chest wall:  No tenderness or deformity   Heart:  Regular rate and rhythm, S1 and S2 normal, no murmur, rub or gallop   Abdomen:  Soft, non-tender, bowel sounds active all four quadrants,   no masses, no organomegaly   Rectal:        Extremities: Extremities normal, atraumatic, no cyanosis or edema   Pulses: 2+ and symmetric all extremities   Skin: Skin color, texture, turgor normal, no rashes or lesions   Lymph nodes: Cervical, supraclavicular, and axillary nodes normal   Neurologic: CNII-XII intact. Normal strength, sensation and reflexes   throughout          Results for orders placed or performed in visit on 05/02/19   Urinalysis With Microscopic If Indicated (No Culture) - Urine, Clean Catch   Result Value Ref Range    Specific Gravity, UA 1.022 1.005 - 1.030    pH, UA 7.0 5.0 - 8.0    Color, UA Yellow     Appearance, UA Clear Clear    Leukocytes, UA Negative Negative    Protein Negative Negative    Glucose, UA Negative Negative    Ketones Negative Negative    Blood, UA Negative Negative    Bilirubin, UA Negative Negative    Urobilinogen, UA Comment     Nitrite, UA Negative Negative   TSH   Result Value Ref Range    TSH 0.845 0.270 - 4.200 mIU/mL   Lipid Panel   Result Value Ref Range    Total Cholesterol 172 0 - 200 mg/dL    Triglycerides 108 0 - 150 mg/dL    HDL Cholesterol 51 40 - 60 mg/dL    VLDL Cholesterol 21.6 mg/dL    LDL Cholesterol  99 0 - 100 mg/dL   Comprehensive Metabolic Panel   Result Value Ref Range    Glucose 91 65 - 99 mg/dL    BUN 12 6 - 20 mg/dL    Creatinine 0.62 0.57 - 1.00 mg/dL    eGFR Non African Am 114 >60 mL/min/1.73    eGFR African Am 138 >60 mL/min/1.73    BUN/Creatinine Ratio 19.4 7.0 - 25.0    Sodium 140 136 - 145 mmol/L    Potassium 4.3 3.5 - 5.2 mmol/L    Chloride 102 98 - 107 mmol/L    Total CO2 26.9 22.0 - 29.0 mmol/L    Calcium 9.5 8.6 - 10.5 mg/dL    Total Protein 7.1 6.0 - 8.5 g/dL    Albumin 4.30 3.50 - 5.20 g/dL    Globulin 2.8 gm/dL     A/G Ratio 1.5 g/dL    Total Bilirubin 0.5 0.2 - 1.2 mg/dL    Alkaline Phosphatase 76 39 - 117 U/L    AST (SGOT) 21 1 - 32 U/L    ALT (SGPT) 26 1 - 33 U/L   CBC (No Diff)   Result Value Ref Range    WBC 8.81 3.40 - 10.80 10*3/mm3    RBC 4.70 3.77 - 5.28 10*6/mm3    Hemoglobin 13.5 12.0 - 15.9 g/dL    Hematocrit 42.4 34.0 - 46.6 %    MCV 90.2 79.0 - 97.0 fL    MCH 28.7 26.6 - 33.0 pg    MCHC 31.8 31.5 - 35.7 g/dL    RDW 12.5 12.3 - 15.4 %    Platelets 241 140 - 450 10*3/mm3     Assessment/Plan   Healthy female exam.  Paola was seen today for annual exam and rash.    Diagnoses and all orders for this visit:    Mild persistent asthma without complication    Routine adult health maintenance      1. Labs in good shape  2. Patient Counseling:  --Nutrition: Stressed importance of moderation in sodium/caffeine intake, saturated fat and cholesterol.  Discussed caloric balance, sufficient intake of fresh fruits, vegetables, fiber, calcium, iron.  --  --Exercise: Stressed the importance of regular exercise.   --Substance Abuse: Discussed cessation/primary prevention of tobacco, alcohol, or other drug use; driving or other dangerous activities under the influence.    --Dental health: Discussed importance of regular tooth brushing, flossing, and dental visits.  -- suggested having eyes and vision checked if needed or past due.  --Immunizations reviewed.  --  3. Discussed the patient's BMI with her.  The BMI is above average; BMI management plan is completed  4. Follow up in one year    There are no Patient Instructions on file for this visit.    Medications Discontinued During This Encounter   Medication Reason   • ibuprofen (ADVIL,MOTRIN) 600 MG tablet *Therapy completed   • Menthol, Topical Analgesic, (BIOFREEZE) 4 % gel *Therapy completed   • clindamycin (CLEOCIN) 150 MG capsule *Therapy completed   • brompheniramine-pseudoephedrine-DM 30-2-10 MG/5ML syrup *Therapy completed        Dr. Luis Armando Kidd MD  Family  [facility]ractice  [place], [place]  [facility]

## 2019-06-20 ENCOUNTER — OFFICE VISIT (OUTPATIENT)
Dept: OBSTETRICS AND GYNECOLOGY | Facility: CLINIC | Age: 29
End: 2019-06-20

## 2019-06-20 VITALS
SYSTOLIC BLOOD PRESSURE: 134 MMHG | WEIGHT: 280 LBS | BODY MASS INDEX: 45 KG/M2 | HEIGHT: 66 IN | DIASTOLIC BLOOD PRESSURE: 78 MMHG

## 2019-06-20 DIAGNOSIS — R87.619 ABNORMAL CERVICAL PAPANICOLAOU SMEAR, UNSPECIFIED ABNORMAL PAP FINDING: ICD-10-CM

## 2019-06-20 DIAGNOSIS — Z13.9 SCREENING FOR CONDITION: ICD-10-CM

## 2019-06-20 DIAGNOSIS — Z30.09 FAMILY PLANNING: ICD-10-CM

## 2019-06-20 DIAGNOSIS — Z01.419 ENCOUNTER FOR GYNECOLOGICAL EXAMINATION WITHOUT ABNORMAL FINDING: ICD-10-CM

## 2019-06-20 DIAGNOSIS — Z11.51 SPECIAL SCREENING EXAMINATION FOR HUMAN PAPILLOMAVIRUS (HPV): ICD-10-CM

## 2019-06-20 DIAGNOSIS — Z01.419 PAP SMEAR, LOW-RISK: Primary | ICD-10-CM

## 2019-06-20 PROCEDURE — 99395 PREV VISIT EST AGE 18-39: CPT | Performed by: OBSTETRICS & GYNECOLOGY

## 2019-06-20 PROCEDURE — 81025 URINE PREGNANCY TEST: CPT | Performed by: OBSTETRICS & GYNECOLOGY

## 2019-06-20 PROCEDURE — 81002 URINALYSIS NONAUTO W/O SCOPE: CPT | Performed by: OBSTETRICS & GYNECOLOGY

## 2019-06-20 NOTE — PROGRESS NOTES
GYN Annual Exam     CC- Here for annual exam.     Paola Neves is a 29 y.o. female established patient who presents for annual well woman exam. We removed her Kyleena in 2019 and she has been having regular cycles. Periods are every 25 days, lasting 5 days.  Her cycles are heavy. They are trying to get pregnant now. She has been using OVP kits and usually gets a positive ovulation indicator but did not this month. She has had abnormal paps since 2017:  2017- LGSIL, CIERA 1 on bx  2017- normal pap  2018- LGSIL  2018- ASCUS  +HPV, CIERA 1 onbx    OB History      Para Term  AB Living    0 0 0 0 0 0    SAB TAB Ectopic Molar Multiple Live Births    0 0 0   0          Obstetric Comments    Desires pregnancy          Menarche: 13  Current contraception: none  History of abnormal Pap smear: yes -  see above  History of abnormal mammogram: no  Family history of uterine, colon or ovarian cancer: no  Family history of breast cancer: no  H/o STDs: none  Gardasil: declined  Last pap:2018- ASCUS + HPV  Gardasil:refuses  DUANE: none    Health Maintenance   Topic Date Due   • INFLUENZA VACCINE  2019   • ANNUAL PHYSICAL  2020   • ANNUAL GYN EXAM  2020   • PAP SMEAR  2021   • TDAP/TD VACCINES (2 - Td) 2028       Past Medical History:   Diagnosis Date   • Abnormal Pap smear of cervix     2017 LGSIL/CIERA 1, 2017 normal   • Asthma    • Hypertension    • Menstrual problem        Past Surgical History:   Procedure Laterality Date   • WISDOM TOOTH EXTRACTION     • WISDOM TOOTH EXTRACTION           Current Outpatient Medications:   •  albuterol (PROVENTIL HFA;VENTOLIN HFA) 108 (90 BASE) MCG/ACT inhaler, Inhale 2 puffs every 4 (four) hours as needed., Disp: , Rfl:   •  Beclomethasone Diprop HFA (QVAR REDIHALER) 40 MCG/ACT inhaler, Inhale 2 puffs 2 (Two) Times a Day., Disp: 1 inhaler, Rfl: 11  •  cetirizine (ZyrTEC) 10 MG tablet, Take 10 mg by mouth daily., Disp: , Rfl:   •   montelukast (SINGULAIR) 10 MG tablet, TAKE 1 TABLET BY MOUTH DAILY AT BEDTIME, Disp: , Rfl: 1  •  Prenatal MV-Min-Fe Fum-FA-DHA (PRENATAL 1 PO), Take 1 capsule by mouth Every Morning., Disp: , Rfl:     Allergies   Allergen Reactions   • Penicillins Hives     Broke out in a rash in kindergarden   • Sulfa Antibiotics Hives     Hives in college age       Social History     Tobacco Use   • Smoking status: Never Smoker   • Smokeless tobacco: Never Used   Substance Use Topics   • Alcohol use: Yes     Alcohol/week: 0.6 oz     Types: 1 Glasses of wine per week     Frequency: 2-4 times a month     Drinks per session: 1 or 2     Binge frequency: Never   • Drug use: No       Family History   Problem Relation Age of Onset   • Thyroid disease Mother         Graves   • Ulcerative colitis Mother    • Hypertension Father    • Hearing loss Father    • Cancer Maternal Grandmother    • Asthma Paternal Grandmother    • Hearing loss Paternal Grandmother    • Heart disease Paternal Grandfather    • Diabetes Paternal Grandfather    • Cancer Paternal Grandfather    • No Known Problems Sister    • Ulcerative colitis Brother    • Breast cancer Neg Hx    • Ovarian cancer Neg Hx    • Uterine cancer Neg Hx    • Colon cancer Neg Hx    • Pulmonary embolism Neg Hx    • Deep vein thrombosis Neg Hx        Review of Systems   Constitutional: Negative for appetite change, fatigue, fever and unexpected weight change.   Eyes: Negative for photophobia and visual disturbance.   Respiratory: Negative for cough and shortness of breath.    Cardiovascular: Negative for chest pain and palpitations.   Gastrointestinal: Negative for abdominal distention, abdominal pain, constipation, diarrhea and nausea.   Endocrine: Negative for cold intolerance and heat intolerance.   Genitourinary: Negative for dyspareunia, dysuria, menstrual problem (heavy), pelvic pain and vaginal discharge.   Musculoskeletal: Negative for back pain.   Skin: Negative for color change and  "rash.   Allergic/Immunologic: Positive for environmental allergies.   Neurological: Negative for headaches.   Hematological: Negative for adenopathy. Does not bruise/bleed easily.   Psychiatric/Behavioral: Negative for dysphoric mood. The patient is not nervous/anxious.        /78   Ht 167.6 cm (66\")   Wt 127 kg (280 lb)   LMP 06/12/2019   BMI 45.19 kg/m²     Physical Exam   Constitutional: She is oriented to person, place, and time. She appears well-developed and well-nourished.   HENT:   Head: Normocephalic and atraumatic.   Eyes: Conjunctivae are normal. No scleral icterus.   Neck: Neck supple. No thyromegaly present.   Cardiovascular: Normal rate and regular rhythm.   Pulmonary/Chest: Effort normal and breath sounds normal. Right breast exhibits no inverted nipple, no mass, no nipple discharge, no skin change and no tenderness. Left breast exhibits no inverted nipple, no mass, no nipple discharge, no skin change and no tenderness.   Abdominal: Soft. Bowel sounds are normal. She exhibits no distension and no mass. There is no tenderness. There is no rebound and no guarding. No hernia.   Genitourinary: Pelvic exam was performed with patient supine. There is no rash, tenderness or lesion on the right labia. There is no rash, tenderness or lesion on the left labia. Uterus is not deviated, not enlarged, not fixed and not tender. Cervix exhibits no motion tenderness, no discharge and no friability. Right adnexum displays no mass, no tenderness and no fullness. Left adnexum displays no mass, no tenderness and no fullness. No erythema, tenderness or bleeding in the vagina. No foreign body in the vagina. No signs of injury around the vagina. No vaginal discharge found.   Neurological: She is alert and oriented to person, place, and time.   Skin: Skin is warm and dry.   Psychiatric: She has a normal mood and affect. Her behavior is normal. Judgment and thought content normal.   Nursing note and vitals " reviewed.         Assessment/Plan    1) GYN HM: pap  SBE demonstrated and encouraged. Will call with results and f/u  2) STD screening: declines Condoms encouraged.  3) Contraception: none  4) Family Planning:  encourage folic acid daily. She is A+ and RI. We discussed genetic carrier screening preconceptually and she declines for now. Info given. Will check day 21 progesterone to ensure ovulation. Has not had PCOS eval before.   5) Diet and Exercise discussed  6) Smoking Status: No  7) Social: , doing well.   8) MMG- plan age 40  9)Follow up prn or 1 year       Paola was seen today for gynecologic exam.    Diagnoses and all orders for this visit:    Pap smear, low-risk  -     Pap IG, Rfx HPV ASCU    Screening for condition  -     POC Urinalysis Dipstick  -     POC Pregnancy, Urine    Special screening examination for human papillomavirus (HPV)  -     Pap IG, Rfx HPV ASCU    Encounter for gynecological examination without abnormal finding    Family planning          Tanja Jeter MD  6/19/19  2:04 PM

## 2019-06-20 NOTE — PROGRESS NOTES
GYN Annual Exam     CC- Here for annual exam.     Paola Neves is a 29 y.o. female {akrpttype:85865} who presents for annual well woman exam. Periods are {gyn period regularity:715}, lasting {numbers; 0-10:85299} days.     OB History      Para Term  AB Living    0 0 0 0 0 0    SAB TAB Ectopic Molar Multiple Live Births    0 0 0   0          Obstetric Comments    No plans          Menarche: ***  Current contraception: {contraceptive methods:86562}  History of abnormal Pap smear: {Responses; yes**/no:81362}  History of abnormal mammogram: {yes***/no:33420}  Family history of uterine, colon or ovarian cancer: {yes***/no:29202}  Family history of breast cancer: {yes***/no:59330}  H/o STDs: ***  Gardasil: ***  Last pap:***  Gardasil:{akrgard:62151}  DUANE: {DUANE:79132}    Health Maintenance   Topic Date Due   • ANNUAL GYN EXAM  06/15/2019   • INFLUENZA VACCINE  2019   • ANNUAL PHYSICAL  2020   • PAP SMEAR  2021   • TDAP/TD VACCINES (2 - Td) 2028       Past Medical History:   Diagnosis Date   • Abnormal Pap smear of cervix     2017 LGSIL/CIERA 1, 2017 normal   • Asthma    • Hypertension    • Menstrual problem        Past Surgical History:   Procedure Laterality Date   • WISDOM TOOTH EXTRACTION     • WISDOM TOOTH EXTRACTION           Current Outpatient Medications:   •  albuterol (PROVENTIL HFA;VENTOLIN HFA) 108 (90 BASE) MCG/ACT inhaler, Inhale 2 puffs every 4 (four) hours as needed., Disp: , Rfl:   •  Beclomethasone Diprop HFA (QVAR REDIHALER) 40 MCG/ACT inhaler, Inhale 2 puffs 2 (Two) Times a Day., Disp: 1 inhaler, Rfl: 11  •  cetirizine (ZyrTEC) 10 MG tablet, Take 10 mg by mouth daily., Disp: , Rfl:   •  montelukast (SINGULAIR) 10 MG tablet, TAKE 1 TABLET BY MOUTH DAILY AT BEDTIME, Disp: , Rfl: 1  •  Prenatal MV-Min-Fe Fum-FA-DHA (PRENATAL 1 PO), Take 1 capsule by mouth Every Morning., Disp: , Rfl:     Allergies   Allergen Reactions   • Penicillins Hives     Broke out in a rash  "in kindergarden   • Sulfa Antibiotics Hives     Hives in college age       Social History     Tobacco Use   • Smoking status: Never Smoker   • Smokeless tobacco: Never Used   Substance Use Topics   • Alcohol use: Yes     Alcohol/week: 0.6 oz     Types: 1 Glasses of wine per week     Frequency: 2-4 times a month     Drinks per session: 1 or 2     Binge frequency: Never   • Drug use: No       Family History   Problem Relation Age of Onset   • Thyroid disease Mother         Graves   • Ulcerative colitis Mother    • Hypertension Father    • Hearing loss Father    • Cancer Maternal Grandmother    • Asthma Paternal Grandmother    • Hearing loss Paternal Grandmother    • Heart disease Paternal Grandfather    • Diabetes Paternal Grandfather    • Cancer Paternal Grandfather    • No Known Problems Sister    • Ulcerative colitis Brother    • Breast cancer Neg Hx    • Ovarian cancer Neg Hx    • Uterine cancer Neg Hx    • Colon cancer Neg Hx    • Pulmonary embolism Neg Hx    • Deep vein thrombosis Neg Hx        Review of Systems    /78   Ht 167.6 cm (66\")   Wt 127 kg (280 lb)   LMP 2019   BMI 45.19 kg/m²     Physical Exam       Assessment/Plan    1) GYN HM: {akrpap:34488}  SBE demonstrated and encouraged.  2) STD screening: {akrSTD:13439} Condoms encouraged.  3) Contraception: {contraceptive methods:36281}  4) Family Planning: ***, encourage folic acid daily  5) Diet and Exercise discussed  6) Smoking Status: {YES/NO:46657}  7) Social: ***  8) MMG- {akrmm}  9)Follow up prn or 1 year       Paola was seen today for gynecologic exam.    Diagnoses and all orders for this visit:    Pap smear, low-risk  -     Pap IG, Rfx HPV ASCU    Screening for condition  -     POC Urinalysis Dipstick  -     POC Pregnancy, Urine    Special screening examination for human papillomavirus (HPV)  -     Pap IG, Rfx HPV ASCU          Tanja Jeter MD  2019  4:23 PM    "

## 2019-06-23 PROBLEM — Z00.00 ROUTINE ADULT HEALTH MAINTENANCE: Status: RESOLVED | Noted: 2017-02-24 | Resolved: 2019-06-23

## 2019-06-24 LAB
CONV .: NORMAL
CYTOLOGIST CVX/VAG CYTO: NORMAL
CYTOLOGY CVX/VAG DOC CYTO: NORMAL
CYTOLOGY CVX/VAG DOC THIN PREP: NORMAL
DX ICD CODE: NORMAL
HIV 1 & 2 AB SER-IMP: NORMAL
OTHER STN SPEC: NORMAL
STAT OF ADQ CVX/VAG CYTO-IMP: NORMAL

## 2019-07-03 ENCOUNTER — LAB (OUTPATIENT)
Dept: OBSTETRICS AND GYNECOLOGY | Facility: CLINIC | Age: 29
End: 2019-07-03

## 2019-07-03 DIAGNOSIS — Z30.09 FAMILY PLANNING: Primary | ICD-10-CM

## 2019-07-04 LAB — PROGEST SERPL-MCNC: 1.5 NG/ML

## 2019-07-26 ENCOUNTER — LAB (OUTPATIENT)
Dept: OBSTETRICS AND GYNECOLOGY | Facility: CLINIC | Age: 29
End: 2019-07-26

## 2019-07-26 DIAGNOSIS — Z01.419 ENCOUNTER FOR GYNECOLOGICAL EXAMINATION WITHOUT ABNORMAL FINDING: Primary | ICD-10-CM

## 2019-07-30 LAB
17OHP SERPL-MCNC: 32 NG/DL
DHEA-S SERPL-MCNC: 96.5 UG/DL (ref 84.8–378)
ESTRADIOL SERPL-MCNC: 136.7 PG/ML
FSH SERPL-ACNC: 5.6 MIU/ML
GLUCOSE 1H P 75 G GLC PO SERPL-MCNC: 134 MG/DL (ref 40–300)
GLUCOSE 2H P 75 G GLC PO SERPL-MCNC: 95 MG/DL (ref 40–300)
GLUCOSE P FAST SERPL-MCNC: 92 MG/DL (ref 40–300)
GLUCOSE P FAST SERPL-MCNC: 92 MG/DL (ref 74–106)
INSULIN SERPL-ACNC: 18.8 UIU/ML (ref 2.6–24.9)
PROLACTIN SERPL-MCNC: 12.6 NG/ML (ref 4.8–23.3)
TESTOST FREE SERPL-MCNC: 1.3 PG/ML (ref 0–4.2)
TSH SERPL DL<=0.005 MIU/L-ACNC: 1.06 MIU/ML (ref 0.27–4.2)

## 2019-08-14 ENCOUNTER — PROCEDURE VISIT (OUTPATIENT)
Dept: OBSTETRICS AND GYNECOLOGY | Facility: CLINIC | Age: 29
End: 2019-08-14

## 2019-08-14 ENCOUNTER — OFFICE VISIT (OUTPATIENT)
Dept: OBSTETRICS AND GYNECOLOGY | Facility: CLINIC | Age: 29
End: 2019-08-14

## 2019-08-14 VITALS
WEIGHT: 277 LBS | DIASTOLIC BLOOD PRESSURE: 80 MMHG | BODY MASS INDEX: 44.52 KG/M2 | HEIGHT: 66 IN | SYSTOLIC BLOOD PRESSURE: 122 MMHG

## 2019-08-14 DIAGNOSIS — Z30.09 FAMILY PLANNING: Primary | ICD-10-CM

## 2019-08-14 DIAGNOSIS — R87.612 LGSIL ON PAP SMEAR OF CERVIX: ICD-10-CM

## 2019-08-14 DIAGNOSIS — N97.9 INFERTILITY, FEMALE: Primary | ICD-10-CM

## 2019-08-14 DIAGNOSIS — Z31.9 INFERTILITY MANAGEMENT: ICD-10-CM

## 2019-08-14 PROCEDURE — 81025 URINE PREGNANCY TEST: CPT | Performed by: OBSTETRICS & GYNECOLOGY

## 2019-08-14 PROCEDURE — 99214 OFFICE O/P EST MOD 30 MIN: CPT | Performed by: OBSTETRICS & GYNECOLOGY

## 2019-08-14 PROCEDURE — 76830 TRANSVAGINAL US NON-OB: CPT | Performed by: OBSTETRICS & GYNECOLOGY

## 2019-08-14 NOTE — PROGRESS NOTES
"      Paola Neves is a 29 y.o. patient who presents for follow up of   Chief Complaint   Patient presents with   • Follow-up       30 yo est pt here for discussion of pregnancy. She has regular cycles q 25 days. She had a normal PCOS panel and her US was normal. She is A+ and rubella immune. She declines SMA,CF,FX. She also had a normal pap in 6/2019. We discussed that her partner needs to have a semen analysis to r/o male factor. She is interested in ovulation induction.       The following portions of the patient's history were reviewed and updated as appropriate: allergies, current medications and problem list.    Review of Systems   Constitutional: Negative for appetite change, fatigue, fever and unexpected weight change.   Eyes: Negative for photophobia and visual disturbance.   Respiratory: Negative for cough and shortness of breath.    Cardiovascular: Negative for chest pain and palpitations.   Gastrointestinal: Negative for abdominal distention, abdominal pain, constipation, diarrhea and nausea.   Endocrine: Negative for cold intolerance and heat intolerance.   Genitourinary: Negative for dyspareunia, dysuria, menstrual problem (heavy), pelvic pain and vaginal discharge.   Musculoskeletal: Negative for back pain.   Skin: Negative for color change and rash.   Allergic/Immunologic: Positive for environmental allergies.   Neurological: Negative for headaches.   Hematological: Negative for adenopathy. Does not bruise/bleed easily.   Psychiatric/Behavioral: Negative for dysphoric mood. The patient is not nervous/anxious.        /80   Ht 167.6 cm (65.98\")   Wt 126 kg (277 lb)   LMP 08/10/2019   BMI 44.73 kg/m²     Physical Exam   Constitutional: She is oriented to person, place, and time. She appears well-developed and well-nourished.   HENT:   Head: Normocephalic and atraumatic.   Abdominal: Soft. Bowel sounds are normal. She exhibits no distension and no mass. There is no tenderness. There is no " rebound and no guarding. No hernia.   Neurological: She is alert and oriented to person, place, and time.   Skin: Skin is warm and dry.   Psychiatric: She has a normal mood and affect. Her behavior is normal. Judgment and thought content normal.   Nursing note and vitals reviewed.      A/P:  1. Obesity- normal PCOS labs and US.   2. Preconceptual counseling- declines SMA, CF, FX screening. Enc folic acid.   3. Subfertility- check SA to r/o male factor. Desires Clomid. D/w pt R/B/A of Clomid including risk of ovarian cysts, OHSS, mood changes, thin EL and thickened CM. Will start on Clomid 50 mg on days 3-8. Check Day 21 progesterone. Timed intercourse d/w pt.   4. H/O abnormal pap- normal pap 6/2019. Repeat pap in 12/2019.   Assessment/Plan   Paola was seen today for follow-up.    Diagnoses and all orders for this visit:    Family planning  -     POC Pregnancy, Urine    Infertility management    LGSIL on Pap smear of cervix    Other orders  -     clomiPHENE (CLOMID) 50 MG tablet; Take one pill daily by mouth on days 3-8 of cycle                   No Follow-up on file.      Tanja Jeter MD    8/14/19  8:05 PM

## 2019-09-05 ENCOUNTER — TELEPHONE (OUTPATIENT)
Dept: OBSTETRICS AND GYNECOLOGY | Facility: CLINIC | Age: 29
End: 2019-09-05

## 2019-09-05 NOTE — TELEPHONE ENCOUNTER
Pharmacy calling to clarify patient clomiPHENE (CLOMID) 50 MG tablet Direction say to take on days 3-8 with only a quantity of 5 and it should be 6.

## 2019-09-26 DIAGNOSIS — J45.30 MILD PERSISTENT ASTHMA WITHOUT COMPLICATION: ICD-10-CM

## 2019-09-27 RX ORDER — BECLOMETHASONE DIPROPIONATE HFA 40 UG/1
AEROSOL, METERED RESPIRATORY (INHALATION)
Qty: 10.6 G | Refills: 11 | Status: SHIPPED | OUTPATIENT
Start: 2019-09-27 | End: 2020-11-23

## 2019-10-21 ENCOUNTER — APPOINTMENT (OUTPATIENT)
Dept: GENERAL RADIOLOGY | Facility: HOSPITAL | Age: 29
End: 2019-10-21

## 2019-10-21 PROCEDURE — 73110 X-RAY EXAM OF WRIST: CPT | Performed by: INTERNAL MEDICINE

## 2019-10-21 PROCEDURE — 73130 X-RAY EXAM OF HAND: CPT | Performed by: INTERNAL MEDICINE

## 2019-10-21 PROCEDURE — 73090 X-RAY EXAM OF FOREARM: CPT | Performed by: INTERNAL MEDICINE

## 2019-10-24 ENCOUNTER — LAB (OUTPATIENT)
Dept: OBSTETRICS AND GYNECOLOGY | Facility: CLINIC | Age: 29
End: 2019-10-24

## 2019-10-24 DIAGNOSIS — Z31.9 INFERTILITY MANAGEMENT: Primary | ICD-10-CM

## 2019-10-25 LAB — PROGEST SERPL-MCNC: 3.9 NG/ML

## 2019-10-27 NOTE — PROGRESS NOTES
PIP= D21 P shows an ovulation response but the levels are on the low side. Rec she do 50 mg of Clomid for on more month and if they do not look better we will go up to 100 mg. Her partner also needs a semen analysis ( she was given the kit last visit)

## 2019-11-04 ENCOUNTER — OFFICE VISIT (OUTPATIENT)
Dept: RETAIL CLINIC | Facility: CLINIC | Age: 29
End: 2019-11-04

## 2019-11-04 VITALS
OXYGEN SATURATION: 99 % | DIASTOLIC BLOOD PRESSURE: 92 MMHG | SYSTOLIC BLOOD PRESSURE: 136 MMHG | HEART RATE: 90 BPM | TEMPERATURE: 99.4 F

## 2019-11-04 DIAGNOSIS — J01.10 ACUTE FRONTAL SINUSITIS, RECURRENCE NOT SPECIFIED: Primary | ICD-10-CM

## 2019-11-04 PROCEDURE — 99213 OFFICE O/P EST LOW 20 MIN: CPT | Performed by: NURSE PRACTITIONER

## 2019-11-04 RX ORDER — DOXYCYCLINE HYCLATE 100 MG/1
100 TABLET, DELAYED RELEASE ORAL 2 TIMES DAILY
Qty: 14 TABLET | Refills: 0 | Status: SHIPPED | OUTPATIENT
Start: 2019-11-04 | End: 2019-11-11

## 2019-11-04 RX ORDER — FLUCONAZOLE 150 MG/1
150 TABLET ORAL ONCE
Qty: 1 TABLET | Refills: 0 | Status: SHIPPED | OUTPATIENT
Start: 2019-11-04 | End: 2019-11-04

## 2019-11-04 NOTE — PROGRESS NOTES
"Subjective   Paola Neves is a 29 y.o. female.     URI    This is a new problem. The current episode started in the past 7 days (4 days ago). The problem has been gradually worsening. There has been no fever. Associated symptoms include congestion, coughing (thick white mucus), ear pain, headaches, a plugged ear sensation and a sore throat. Pertinent negatives include no diarrhea, nausea, rhinorrhea, sinus pain, sneezing or vomiting. Treatments tried: albuterol inhaler, benadryl, allergy & congestion, nyquil. The treatment provided mild relief.        The following portions of the patient's history were reviewed and updated as appropriate: allergies, current medications, past family history, past medical history, past social history, past surgical history and problem list.    Review of Systems   Constitutional: Positive for fatigue. Negative for appetite change, chills and fever.   HENT: Positive for congestion, ear pain, postnasal drip and sore throat. Negative for rhinorrhea, sinus pressure and sneezing.    Respiratory: Positive for cough (thick white mucus).    Cardiovascular: Negative.    Gastrointestinal: Negative for diarrhea, nausea and vomiting.   Musculoskeletal: Negative.    Neurological: Positive for headache (frontal and \"beside my eyes\").       Objective   Physical Exam   Constitutional: She is oriented to person, place, and time. She appears well-developed and well-nourished. No distress.   HENT:   Head: Normocephalic and atraumatic.   Right Ear: Hearing, external ear and ear canal normal. Tympanic membrane is not erythematous and not bulging. A middle ear effusion is present.   Left Ear: Hearing, external ear and ear canal normal. Tympanic membrane is not erythematous and not bulging. A middle ear effusion is present.   Nose: Right sinus exhibits frontal sinus tenderness. Right sinus exhibits no maxillary sinus tenderness. Left sinus exhibits frontal sinus tenderness. Left sinus exhibits no " maxillary sinus tenderness.   Mouth/Throat: Uvula is midline and mucous membranes are normal. Oropharyngeal exudate, posterior oropharyngeal edema (small, clear postnasal drip) and posterior oropharyngeal erythema present. Tonsils are 1+ on the right. Tonsils are 1+ on the left. No tonsillar exudate.   Eyes: Conjunctivae and EOM are normal. Pupils are equal, round, and reactive to light.   Neck: Normal range of motion.   Cardiovascular: Normal rate, regular rhythm and normal heart sounds. Exam reveals no gallop and no friction rub.   No murmur heard.  Pulmonary/Chest: Effort normal and breath sounds normal. No respiratory distress.   Lymphadenopathy:        Head (right side): Tonsillar adenopathy present. No submental, no submandibular, no preauricular and no posterior auricular adenopathy present.        Head (left side): Tonsillar adenopathy present. No submental, no submandibular, no preauricular and no posterior auricular adenopathy present.     She has no cervical adenopathy.   Neurological: She is alert and oriented to person, place, and time.   Skin: Skin is warm and dry. She is not diaphoretic.   Psychiatric: She has a normal mood and affect.         Assessment/Plan   Diagnoses and all orders for this visit:    Acute frontal sinusitis, recurrence not specified    Other orders  -     fluconazole (DIFLUCAN) 150 MG tablet; Take 1 tablet by mouth 1 (One) Time for 1 dose.  -     doxycycline (DORYX) 100 MG enteric coated tablet; Take 1 tablet by mouth 2 (Two) Times a Day for 7 days.    Diflucan provided because pt. Reports history of yeast infections when taking antibiotics. Instructed pt. To followup with PCP if no improvement in symptoms.

## 2019-11-11 ENCOUNTER — TELEPHONE (OUTPATIENT)
Dept: OBSTETRICS AND GYNECOLOGY | Facility: CLINIC | Age: 29
End: 2019-11-11

## 2019-11-11 DIAGNOSIS — N91.5 OLIGOMENORRHEA, UNSPECIFIED TYPE: Primary | ICD-10-CM

## 2019-11-11 NOTE — TELEPHONE ENCOUNTER
Can you place an order for patient to have Day 21 Progesterone drawn. Her day 21 will be 11/24/2019. She is going to the hospital lab since it is a Sunday. :)

## 2019-11-16 ENCOUNTER — RESULTS ENCOUNTER (OUTPATIENT)
Dept: OBSTETRICS AND GYNECOLOGY | Facility: CLINIC | Age: 29
End: 2019-11-16

## 2019-11-16 DIAGNOSIS — N91.5 OLIGOMENORRHEA, UNSPECIFIED TYPE: ICD-10-CM

## 2019-11-24 ENCOUNTER — APPOINTMENT (OUTPATIENT)
Dept: LAB | Facility: HOSPITAL | Age: 29
End: 2019-11-24

## 2019-11-24 PROCEDURE — 36415 COLL VENOUS BLD VENIPUNCTURE: CPT | Performed by: OBSTETRICS & GYNECOLOGY

## 2019-11-24 PROCEDURE — 84144 ASSAY OF PROGESTERONE: CPT | Performed by: OBSTETRICS & GYNECOLOGY

## 2019-11-26 LAB — PROGEST SERPL-MCNC: 13.1 NG/ML

## 2019-12-18 ENCOUNTER — OFFICE VISIT (OUTPATIENT)
Dept: OBSTETRICS AND GYNECOLOGY | Facility: CLINIC | Age: 29
End: 2019-12-18

## 2019-12-18 VITALS
BODY MASS INDEX: 44.84 KG/M2 | DIASTOLIC BLOOD PRESSURE: 70 MMHG | WEIGHT: 279 LBS | HEIGHT: 66 IN | SYSTOLIC BLOOD PRESSURE: 124 MMHG

## 2019-12-18 DIAGNOSIS — Z31.9 INFERTILITY MANAGEMENT: ICD-10-CM

## 2019-12-18 DIAGNOSIS — Z87.42 HISTORY OF ABNORMAL CERVICAL PAP SMEAR: Primary | ICD-10-CM

## 2019-12-18 LAB
B-HCG UR QL: NEGATIVE
BILIRUB BLD-MCNC: NEGATIVE MG/DL
CLARITY, POC: CLEAR
COLOR UR: YELLOW
GLUCOSE UR STRIP-MCNC: NEGATIVE MG/DL
INTERNAL NEGATIVE CONTROL: NEGATIVE
INTERNAL POSITIVE CONTROL: POSITIVE
KETONES UR QL: NEGATIVE
LEUKOCYTE EST, POC: NEGATIVE
Lab: 3215
NITRITE UR-MCNC: NEGATIVE MG/ML
PH UR: 5 [PH] (ref 5–8)
PROT UR STRIP-MCNC: NEGATIVE MG/DL
RBC # UR STRIP: NEGATIVE /UL
SP GR UR: 1 (ref 1–1.03)
UROBILINOGEN UR QL: NORMAL

## 2019-12-18 PROCEDURE — 81025 URINE PREGNANCY TEST: CPT | Performed by: OBSTETRICS & GYNECOLOGY

## 2019-12-18 PROCEDURE — 81002 URINALYSIS NONAUTO W/O SCOPE: CPT | Performed by: OBSTETRICS & GYNECOLOGY

## 2019-12-18 PROCEDURE — 99214 OFFICE O/P EST MOD 30 MIN: CPT | Performed by: OBSTETRICS & GYNECOLOGY

## 2019-12-18 NOTE — PROGRESS NOTES
Paola Neves is a 29 y.o. patient who presents for follow up of   Chief Complaint   Patient presents with   • Follow-up     repap      29-year-old established patient here for her second 6-month repeat Pap.  Her paps are as follows:  5/2017- LGSIL, CIERA 1 on bx  11/2017- normal pap  6/2018- LGSIL  12/2018- ASCUS  +HPV, CIERA 1 onbx  8/2019- normal pap  She is also on Clomid for ovulation induction. She has done 3 rounds of Clomid 50 mg. The first month she was on vacation and did not do a D21P. The second month D21P was 3.9 and the third month it was 13. NO side effects from Clomid except HA. Cycles are still regular but heavy. Her been has had a semen analysis in August.  They have requested records twice but they are still not in the patient's chart.  We called [facility] [place] today to get those results again.  We discussed referral to [facility] for male factor infertility.  I also offered referral to [facility] at any point in time.  She would like to try Clomid.  This will be her fourth month.  I recommend she come back to see me in March 2020 if she is not pregnant we will try 3 months of letrozole.  She declines [facility] referral at this time.        The following portions of the patient's history were reviewed and updated as appropriate: allergies, current medications and problem list.    Review of Systems   Constitutional: Negative for appetite change, fatigue, fever and unexpected weight change.   Eyes: Negative for photophobia and visual disturbance.   Respiratory: Negative for cough and shortness of breath.    Cardiovascular: Negative for chest pain and palpitations.   Gastrointestinal: Negative for abdominal distention, abdominal pain, constipation, diarrhea and nausea.   Endocrine: Negative for cold intolerance and heat intolerance.   Genitourinary: Negative for dyspareunia, dysuria, menstrual problem, pelvic pain and vaginal discharge.   Musculoskeletal: Negative for back pain.   Skin: Negative for  "color change and rash.   Neurological: Positive for headaches.   Hematological: Negative for adenopathy. Does not bruise/bleed easily.   Psychiatric/Behavioral: Negative for dysphoric mood. The patient is not nervous/anxious.    All other systems reviewed and are negative.      /70   Ht 167.6 cm (65.98\")   Wt 127 kg (279 lb)   LMP 12/01/2019   Breastfeeding No   BMI 45.06 kg/m²     Physical Exam   Constitutional: She is oriented to person, place, and time. She appears well-developed and well-nourished.   HENT:   Head: Normocephalic and atraumatic.   Eyes: Conjunctivae are normal. No scleral icterus.   Abdominal: Soft. She exhibits no distension and no mass. There is no tenderness. There is no rebound and no guarding. No hernia.   Genitourinary: Vagina normal and uterus normal. Pelvic exam was performed with patient supine. There is no rash, tenderness, lesion or injury on the right labia. There is no rash, tenderness, lesion or injury on the left labia. Cervix exhibits no motion tenderness, no discharge and no friability. Right adnexum displays no mass, no tenderness and no fullness. Left adnexum displays no mass and no fullness.   Genitourinary Comments: Exam limited due to habitus   Neurological: She is alert and oriented to person, place, and time.   Skin: Skin is warm and dry.   Psychiatric: She has a normal mood and affect. Her behavior is normal. Judgment and thought content normal.   Nursing note and vitals reviewed.      A/P:  1. History of abnormal paps-this is her second 6-month repeat Pap.  If this Pap is normal, she can return to yearly screening.  2.  Subfertility-patient is on Clomid 50 mg.  This will be her fourth month.  We will continue Clomid for a total of 6 months.  I have asked that she return to the office in March if she is not pregnant by that and then we can discuss letrozole versus [facility] referral.  3.  Obtain records of  semen analysis.  4. RHM- due annual 7/2020. "     Assessment/Plan   Paola was seen today for follow-up.    Diagnoses and all orders for this visit:    History of abnormal cervical Pap smear  -     Pap IG, Rfx HPV ASCU  -     POC Urinalysis Dipstick  -     POC Pregnancy, Urine    Infertility management    Other orders  -     clomiPHENE (CLOMID) 50 MG tablet; Take one pill daily by mouth on days 3-8 of cycle                 No follow-ups on file.      Tanja Jeter MD    12/18/2019  10:02 AM

## 2019-12-24 LAB
CONV .: ABNORMAL
CYTOLOGIST CVX/VAG CYTO: ABNORMAL
CYTOLOGY CVX/VAG DOC CYTO: ABNORMAL
CYTOLOGY CVX/VAG DOC THIN PREP: ABNORMAL
DX ICD CODE: ABNORMAL
DX ICD CODE: ABNORMAL
HIV 1 & 2 AB SER-IMP: ABNORMAL
HPV I/H RISK 1 DNA CVX QL PROBE+SIG AMP: NEGATIVE
OTHER STN SPEC: ABNORMAL
PATHOLOGIST CVX/VAG CYTO: ABNORMAL
STAT OF ADQ CVX/VAG CYTO-IMP: ABNORMAL

## 2020-01-17 ENCOUNTER — LAB (OUTPATIENT)
Dept: OBSTETRICS AND GYNECOLOGY | Facility: CLINIC | Age: 30
End: 2020-01-17

## 2020-01-17 DIAGNOSIS — Z31.9 INFERTILITY MANAGEMENT: Primary | ICD-10-CM

## 2020-01-18 LAB — PROGEST SERPL-MCNC: 3.6 NG/ML

## 2020-02-10 DIAGNOSIS — Z31.9 INFERTILITY MANAGEMENT: Primary | ICD-10-CM

## 2020-02-15 ENCOUNTER — LAB (OUTPATIENT)
Dept: LAB | Facility: HOSPITAL | Age: 30
End: 2020-02-15

## 2020-02-15 DIAGNOSIS — Z31.9 INFERTILITY MANAGEMENT: ICD-10-CM

## 2020-02-15 LAB — PROGEST SERPL-MCNC: 0.05 NG/ML

## 2020-02-15 PROCEDURE — 84144 ASSAY OF PROGESTERONE: CPT

## 2020-02-15 PROCEDURE — 36415 COLL VENOUS BLD VENIPUNCTURE: CPT

## 2020-03-12 ENCOUNTER — OFFICE VISIT (OUTPATIENT)
Dept: OBSTETRICS AND GYNECOLOGY | Facility: CLINIC | Age: 30
End: 2020-03-12

## 2020-03-12 VITALS
HEIGHT: 66 IN | SYSTOLIC BLOOD PRESSURE: 140 MMHG | DIASTOLIC BLOOD PRESSURE: 86 MMHG | WEIGHT: 280 LBS | BODY MASS INDEX: 45 KG/M2

## 2020-03-12 DIAGNOSIS — F41.9 ANXIETY AND DEPRESSION: ICD-10-CM

## 2020-03-12 DIAGNOSIS — R87.619 ABNORMAL CERVICAL PAPANICOLAOU SMEAR, UNSPECIFIED ABNORMAL PAP FINDING: ICD-10-CM

## 2020-03-12 DIAGNOSIS — F32.A ANXIETY AND DEPRESSION: ICD-10-CM

## 2020-03-12 DIAGNOSIS — Z31.9 INFERTILITY MANAGEMENT: ICD-10-CM

## 2020-03-12 DIAGNOSIS — Z13.9 SCREENING FOR CONDITION: Primary | ICD-10-CM

## 2020-03-12 LAB
B-HCG UR QL: NEGATIVE
BILIRUB BLD-MCNC: NEGATIVE MG/DL
CLARITY, POC: CLEAR
COLOR UR: YELLOW
GLUCOSE UR STRIP-MCNC: NEGATIVE MG/DL
INTERNAL NEGATIVE CONTROL: NEGATIVE
INTERNAL POSITIVE CONTROL: POSITIVE
KETONES UR QL: NEGATIVE
LEUKOCYTE EST, POC: NEGATIVE
Lab: NORMAL
NITRITE UR-MCNC: NEGATIVE MG/ML
PH UR: 6 [PH] (ref 5–8)
PROT UR STRIP-MCNC: NEGATIVE MG/DL
RBC # UR STRIP: NEGATIVE /UL
SP GR UR: 1.03 (ref 1–1.03)
UROBILINOGEN UR QL: NORMAL

## 2020-03-12 PROCEDURE — 99214 OFFICE O/P EST MOD 30 MIN: CPT | Performed by: OBSTETRICS & GYNECOLOGY

## 2020-03-12 PROCEDURE — 81002 URINALYSIS NONAUTO W/O SCOPE: CPT | Performed by: OBSTETRICS & GYNECOLOGY

## 2020-03-12 PROCEDURE — 81025 URINE PREGNANCY TEST: CPT | Performed by: OBSTETRICS & GYNECOLOGY

## 2020-03-12 RX ORDER — LETROZOLE 2.5 MG/1
TABLET, FILM COATED ORAL
Qty: 5 TABLET | Refills: 3 | Status: SHIPPED | OUTPATIENT
Start: 2020-03-12 | End: 2020-06-24

## 2020-03-12 RX ORDER — MEDROXYPROGESTERONE ACETATE 10 MG/1
10 TABLET ORAL DAILY
Qty: 7 TABLET | Refills: 4 | Status: SHIPPED | OUTPATIENT
Start: 2020-03-12 | End: 2020-03-19

## 2020-03-12 RX ORDER — SERTRALINE HYDROCHLORIDE 25 MG/1
25 TABLET, FILM COATED ORAL DAILY
Qty: 30 TABLET | Refills: 2 | Status: SHIPPED | OUTPATIENT
Start: 2020-03-12 | End: 2020-06-24

## 2020-03-12 NOTE — PROGRESS NOTES
"      Paola Neves is a 30 y.o. patient who presents for follow up of   Chief Complaint   Patient presents with   • Infertility       29 yo est pt here for discussion of infertility. She has done 6 rounds of Clomid. She has been up to Clomid 150 mg. Her D21P have been 0.5, 3.6, 13.1,3.9, 1.5 and 0.5). In 12/2019 his SA was normal. She is not interested in seeing [facility] again ( she saw [facility] once) and we discussed Letrazole and she wants to try it. She also wants to start something for anxiety and depression. She is having regular cycles      The following portions of the patient's history were reviewed and updated as appropriate: allergies, current medications and problem list.    Review of Systems   Constitutional: Negative for appetite change, fatigue, fever and unexpected weight change.   Eyes: Negative for photophobia and visual disturbance.   Respiratory: Negative for cough and shortness of breath.    Cardiovascular: Negative for chest pain and palpitations.   Gastrointestinal: Negative for abdominal distention, abdominal pain, constipation, diarrhea and nausea.   Endocrine: Negative for cold intolerance and heat intolerance.   Genitourinary: Negative for dyspareunia, dysuria, menstrual problem, pelvic pain and vaginal discharge.   Musculoskeletal: Negative for back pain.   Skin: Negative for color change and rash.   Neurological: Negative for headaches.   Hematological: Negative for adenopathy. Does not bruise/bleed easily.   Psychiatric/Behavioral: Positive for dysphoric mood. The patient is nervous/anxious.    All other systems reviewed and are negative.      /86   Ht 167.6 cm (65.98\")   Wt 127 kg (280 lb)   LMP 01/26/2020   BMI 45.22 kg/m²     Physical Exam   Constitutional: She is oriented to person, place, and time. She appears well-developed and well-nourished.   HENT:   Head: Normocephalic and atraumatic.   Eyes: Conjunctivae are normal. No scleral icterus.   Abdominal: Soft. She " exhibits no distension and no mass. There is no tenderness. There is no rebound and no guarding. No hernia.   Neurological: She is alert and oriented to person, place, and time.   Skin: Skin is warm and dry.   Psychiatric: She has a normal mood and affect. Her behavior is normal. Judgment and thought content normal.   Nursing note and vitals reviewed.      A/P:  1. Infertility- Rx Letrazole 2.5 mg days 3-8 a month. Rx Provera as well if needed. R/B/A of medications d/w pt. Check day 21P. Pt declines [facility] referral.   2. We discussed a healthy lifestyle before pregnancy, including avoidance of tobacco, alcohol and drugs.  We reviewed her prescription medications.  She does not have any hereditary disease or birth defects in her or her partner’s family.  I recommend she get a flu shot yearly and take folic acid daily.  She was RI in 6/2019 and Rh +.   She was also offered pre conceptual screening for SMA, FX and CF and did not accept.   3. Anxiety and depression- Rx Zoloft 25 mg QD.   4. H/O abnormal pap smear- 12/2019 ASCUS neg HPV, repeat pap at annual 7/2020    Assessment/Plan   Paola was seen today for infertility.    Diagnoses and all orders for this visit:    Screening for condition  -     POC Pregnancy, Urine  -     POC Urinalysis Dipstick    Anxiety and depression    Infertility management    Abnormal cervical Papanicolaou smear, unspecified abnormal pap finding    Other orders  -     sertraline (ZOLOFT) 25 MG tablet; Take 1 tablet by mouth Daily.  -     medroxyPROGESTERone (PROVERA) 10 MG tablet; Take 1 tablet by mouth Daily for 7 days.  -     letrozole (FEMARA) 2.5 MG tablet; Take one by mouth daily on days 3-8 of cycle                 No follow-ups on file.      Tanja Jeter MD    3/12/2020  19:49

## 2020-04-02 ENCOUNTER — LAB (OUTPATIENT)
Dept: OBSTETRICS AND GYNECOLOGY | Facility: CLINIC | Age: 30
End: 2020-04-02

## 2020-04-02 DIAGNOSIS — Z31.9 INFERTILITY MANAGEMENT: Primary | ICD-10-CM

## 2020-04-03 LAB — PROGEST SERPL-MCNC: 8.9 NG/ML

## 2020-05-01 ENCOUNTER — LAB (OUTPATIENT)
Dept: OBSTETRICS AND GYNECOLOGY | Facility: CLINIC | Age: 30
End: 2020-05-01

## 2020-05-01 DIAGNOSIS — Z36.9 ENCOUNTER FOR ANTENATAL SCREENING, UNSPECIFIED: Primary | ICD-10-CM

## 2020-05-01 DIAGNOSIS — Z31.9 INFERTILITY MANAGEMENT: ICD-10-CM

## 2020-05-02 LAB — PROGEST SERPL-MCNC: <0.1 NG/ML

## 2020-05-22 ENCOUNTER — LAB (OUTPATIENT)
Dept: OBSTETRICS AND GYNECOLOGY | Facility: CLINIC | Age: 30
End: 2020-05-22

## 2020-05-22 DIAGNOSIS — Z31.9 INFERTILITY MANAGEMENT: Primary | ICD-10-CM

## 2020-05-23 LAB — PROGEST SERPL-MCNC: 3.1 NG/ML

## 2020-06-17 ENCOUNTER — LAB (OUTPATIENT)
Dept: OBSTETRICS AND GYNECOLOGY | Facility: CLINIC | Age: 30
End: 2020-06-17

## 2020-06-17 DIAGNOSIS — Z31.9 INFERTILITY MANAGEMENT: Primary | ICD-10-CM

## 2020-06-18 LAB — PROGEST SERPL-MCNC: 4.1 NG/ML

## 2020-06-24 ENCOUNTER — OFFICE VISIT (OUTPATIENT)
Dept: OBSTETRICS AND GYNECOLOGY | Facility: CLINIC | Age: 30
End: 2020-06-24

## 2020-06-24 VITALS
SYSTOLIC BLOOD PRESSURE: 124 MMHG | BODY MASS INDEX: 45.16 KG/M2 | DIASTOLIC BLOOD PRESSURE: 82 MMHG | HEIGHT: 66 IN | WEIGHT: 281 LBS

## 2020-06-24 DIAGNOSIS — F41.9 ANXIETY AND DEPRESSION: ICD-10-CM

## 2020-06-24 DIAGNOSIS — Z01.411 ENCOUNTER FOR GYNECOLOGICAL EXAMINATION WITH ABNORMAL FINDING: ICD-10-CM

## 2020-06-24 DIAGNOSIS — Z13.9 SCREENING FOR CONDITION: ICD-10-CM

## 2020-06-24 DIAGNOSIS — Z01.419 PAP SMEAR, LOW-RISK: Primary | ICD-10-CM

## 2020-06-24 DIAGNOSIS — Z87.42 HISTORY OF INFERTILITY: ICD-10-CM

## 2020-06-24 DIAGNOSIS — Z11.51 SPECIAL SCREENING EXAMINATION FOR HUMAN PAPILLOMAVIRUS (HPV): ICD-10-CM

## 2020-06-24 DIAGNOSIS — F32.A ANXIETY AND DEPRESSION: ICD-10-CM

## 2020-06-24 LAB
B-HCG UR QL: NEGATIVE
BILIRUB BLD-MCNC: NEGATIVE MG/DL
CLARITY, POC: CLEAR
COLOR UR: YELLOW
GLUCOSE UR STRIP-MCNC: NEGATIVE MG/DL
INTERNAL NEGATIVE CONTROL: NEGATIVE
INTERNAL POSITIVE CONTROL: POSITIVE
KETONES UR QL: NEGATIVE
LEUKOCYTE EST, POC: NEGATIVE
Lab: NORMAL
NITRITE UR-MCNC: NEGATIVE MG/ML
PH UR: 5 [PH] (ref 5–8)
PROT UR STRIP-MCNC: NEGATIVE MG/DL
RBC # UR STRIP: NEGATIVE /UL
SP GR UR: 1.03 (ref 1–1.03)
UROBILINOGEN UR QL: NORMAL

## 2020-06-24 PROCEDURE — 81025 URINE PREGNANCY TEST: CPT | Performed by: OBSTETRICS & GYNECOLOGY

## 2020-06-24 PROCEDURE — 99213 OFFICE O/P EST LOW 20 MIN: CPT | Performed by: OBSTETRICS & GYNECOLOGY

## 2020-06-24 PROCEDURE — 81002 URINALYSIS NONAUTO W/O SCOPE: CPT | Performed by: OBSTETRICS & GYNECOLOGY

## 2020-06-24 PROCEDURE — 99395 PREV VISIT EST AGE 18-39: CPT | Performed by: OBSTETRICS & GYNECOLOGY

## 2020-06-24 NOTE — PROGRESS NOTES
GYN Annual Exam     CC- Here for annual exam.     Paola Neves is a 30 y.o. female established patient who presents for annual well woman exam. We removed her Kyleena in 2019 and she has been having regular cycles. Periods are every 25 days, lasting 5 days.  Her cycles are heavy. They are trying to get pregnant now. She has been using OVP kits and usually gets a positive ovulation indicator.  We did 6 rounds of Clomid and ovulated on half of them. We have also tried Letrazole for 6 months and ovulated about 50% of the cycles. Her  had a SA oin 2019 that was normal. we discussed seeing [facility] and she is agreeable to seeing Dr. Kessler. She is feeling depressed and wants to restart her Zoloft 25 mg She has had intermittently abnormal paps since 2017:  2017- LGSIL, CIERA 1 on bx  2017- normal pap  2018- LGSIL  2018- ASCUS  +HPV, CIERA 1 on bx  2019- normal pap  2019 - ASCUS neg HPV.    OB History        0    Para   0    Term   0       0    AB   0    Living   0       SAB   0    TAB   0    Ectopic   0    Molar        Multiple   0    Live Births              Obstetric Comments   Desires pregnancy             Menarche: 13  Current contraception: none  History of abnormal Pap smear: yes -  see above  History of abnormal mammogram: no  Family history of uterine, colon or ovarian cancer: no  Family history of breast cancer: no  H/o STDs: none  Gardasil: declined  Last pap:3/2020- ASCUS neg HPV  Gardasil:refuses  DUANE: none   Infertility  PCOS w/u neg      Health Maintenance   Topic Date Due   • HEPATITIS C SCREENING  2016   • ANNUAL PHYSICAL  2020   • INFLUENZA VACCINE  2020   • Annual Gynecologic Pelvic and Breast Exam  2021   • PAP SMEAR  2022   • TDAP/TD VACCINES (2 - Td) 2028       Past Medical History:   Diagnosis Date   • Abnormal Pap smear of cervix     2017 LGSIL/CIERA 1, 2017 normal   • Anxiety    • Asthma    • Depression    • Female infertility     • Hypertension    • Menstrual problem        Past Surgical History:   Procedure Laterality Date   • WISDOM TOOTH EXTRACTION     • WISDOM TOOTH EXTRACTION           Current Outpatient Medications:   •  cetirizine (ZyrTEC) 10 MG tablet, Take 10 mg by mouth daily., Disp: , Rfl:   •  Prenatal MV-Min-Fe Fum-FA-DHA (PRENATAL 1 PO), Take 1 capsule by mouth Every Morning., Disp: , Rfl:   •  QVAR REDIHALER 40 MCG/ACT inhaler, TAKE 2 PUFFS BY MOUTH TWICE A DAY, Disp: 10.6 g, Rfl: 11  •  sertraline (Zoloft) 25 MG tablet, Take 1 tablet by mouth Daily., Disp: 30 tablet, Rfl: 2    Allergies   Allergen Reactions   • Penicillins Hives     Broke out in a rash in kindergarden   • Sulfa Antibiotics Hives     Hives in college age       Social History     Tobacco Use   • Smoking status: Never Smoker   • Smokeless tobacco: Never Used   Substance Use Topics   • Alcohol use: Yes     Alcohol/week: 1.0 standard drinks     Types: 1 Glasses of wine per week     Frequency: 2-4 times a month     Drinks per session: 1 or 2     Binge frequency: Never   • Drug use: No       Family History   Problem Relation Age of Onset   • Thyroid disease Mother         Graves   • Ulcerative colitis Mother    • Hypertension Father    • Hearing loss Father    • Cancer Maternal Grandmother    • Asthma Paternal Grandmother    • Hearing loss Paternal Grandmother    • Heart disease Paternal Grandfather    • Diabetes Paternal Grandfather    • Cancer Paternal Grandfather    • No Known Problems Sister    • Ulcerative colitis Brother    • Breast cancer Neg Hx    • Ovarian cancer Neg Hx    • Uterine cancer Neg Hx    • Colon cancer Neg Hx    • Pulmonary embolism Neg Hx    • Deep vein thrombosis Neg Hx        Review of Systems   Constitutional: Positive for activity change. Negative for appetite change, fatigue, fever and unexpected weight change.   Eyes: Negative for photophobia and visual disturbance.   Respiratory: Negative for cough and shortness of breath.   "  Cardiovascular: Negative for chest pain and palpitations.   Gastrointestinal: Negative for abdominal distention, abdominal pain, constipation, diarrhea and nausea.   Endocrine: Negative for cold intolerance and heat intolerance.   Genitourinary: Negative for dyspareunia, dysuria, menstrual problem (heavy), pelvic pain and vaginal discharge.   Musculoskeletal: Negative for back pain.   Skin: Negative for color change and rash.   Allergic/Immunologic: Positive for environmental allergies.   Neurological: Negative for headaches.   Hematological: Negative for adenopathy. Does not bruise/bleed easily.   Psychiatric/Behavioral: Positive for dysphoric mood. The patient is nervous/anxious.    All other systems reviewed and are negative.      /82   Ht 167.6 cm (65.98\")   Wt 127 kg (281 lb)   LMP 05/28/2020   BMI 45.38 kg/m²     Physical Exam   Constitutional: She is oriented to person, place, and time. She appears well-developed and well-nourished.   HENT:   Head: Normocephalic and atraumatic.   Eyes: Conjunctivae are normal. No scleral icterus.   Neck: Neck supple. No thyromegaly present.   Cardiovascular: Normal rate and regular rhythm.   Pulmonary/Chest: Effort normal and breath sounds normal. Right breast exhibits no inverted nipple, no mass, no nipple discharge, no skin change and no tenderness. Left breast exhibits no inverted nipple, no mass, no nipple discharge, no skin change and no tenderness.   Abdominal: Soft. Bowel sounds are normal. She exhibits no distension and no mass. There is no tenderness. There is no rebound and no guarding. No hernia.   Genitourinary: Pelvic exam was performed with patient supine. There is no rash, tenderness or lesion on the right labia. There is no rash, tenderness or lesion on the left labia. Uterus is not deviated, not enlarged, not fixed and not tender. Cervix exhibits no motion tenderness, no discharge and no friability. Right adnexum displays no mass, no tenderness " and no fullness. Left adnexum displays no mass, no tenderness and no fullness. No erythema, tenderness or bleeding in the vagina. No foreign body in the vagina. No signs of injury around the vagina. No vaginal discharge found.   Neurological: She is alert and oriented to person, place, and time.   Skin: Skin is warm and dry.   Psychiatric: She has a normal mood and affect. Her behavior is normal. Judgment and thought content normal.   Nursing note and vitals reviewed.         Assessment/Plan    1) GYN HM: check pap/HPV SBE demonstrated and encouraged. Will call with results and f/u  2) STD screening: declines Condoms encouraged.  3) Contraception: none  4) Family Planning:  encourage folic acid daily. She is A+ and RI. We discussed genetic carrier screening preconceptually and she declines for now. Info given.   5) Diet and Exercise discussed  6) Smoking Status: No  7) Social: , doing well.   8) MMG- plan age 40  9)Infertility- failed Clomid and Letrazole. She is now agreeable to [facility] referral. Refer Dr. Kessler  10) Anxiety and depression- Rx Zoloft 25 mg   11) I saw the patient with a face mask, gloves and a face shield.  The patient herself was masked.  Social distancing was observed as appropriate.  12)Parts of this document have been copied or forwarded from her previous visits and have been reviewed, updated and edited as indicated.   13)Follow up prn or 1 year       Paola was seen today for gynecologic exam.    Diagnoses and all orders for this visit:    Pap smear, low-risk  -     Pap IG, HPV-hr    Screening for condition  -     POC Urinalysis Dipstick  -     POC Pregnancy, Urine    Special screening examination for human papillomavirus (HPV)  -     Pap IG, HPV-hr    History of infertility  -     Ambulatory Referral to Infertility    Other orders  -     sertraline (Zoloft) 25 MG tablet; Take 1 tablet by mouth Daily.          Tanja Jeter MD  6/24/2020  21:39

## 2020-06-27 RX ORDER — SERTRALINE HYDROCHLORIDE 25 MG/1
25 TABLET, FILM COATED ORAL DAILY
Qty: 30 TABLET | Refills: 2 | Status: SHIPPED | OUTPATIENT
Start: 2020-06-27 | End: 2020-12-22 | Stop reason: SDUPTHER

## 2020-06-29 LAB
CYTOLOGIST CVX/VAG CYTO: NORMAL
CYTOLOGY CVX/VAG DOC CYTO: NORMAL
CYTOLOGY CVX/VAG DOC THIN PREP: NORMAL
DX ICD CODE: NORMAL
HIV 1 & 2 AB SER-IMP: NORMAL
HPV I/H RISK 1 DNA CVX QL PROBE+SIG AMP: NEGATIVE
OTHER STN SPEC: NORMAL
STAT OF ADQ CVX/VAG CYTO-IMP: NORMAL

## 2020-08-03 ENCOUNTER — TELEPHONE (OUTPATIENT)
Dept: FAMILY MEDICINE CLINIC | Facility: CLINIC | Age: 30
End: 2020-08-03

## 2020-08-03 NOTE — TELEPHONE ENCOUNTER
Left message for patient to call me back to schedule CPE/AWV since   we had to cancel due to covid pandemic

## 2020-11-20 DIAGNOSIS — J45.30 MILD PERSISTENT ASTHMA WITHOUT COMPLICATION: ICD-10-CM

## 2020-11-23 RX ORDER — BECLOMETHASONE DIPROPIONATE HFA 40 UG/1
AEROSOL, METERED RESPIRATORY (INHALATION)
Qty: 10.6 G | Refills: 5 | Status: SHIPPED | OUTPATIENT
Start: 2020-11-23 | End: 2021-03-12 | Stop reason: SDUPTHER

## 2020-12-22 RX ORDER — SERTRALINE HYDROCHLORIDE 25 MG/1
25 TABLET, FILM COATED ORAL DAILY
Qty: 30 TABLET | Refills: 2 | Status: SHIPPED | OUTPATIENT
Start: 2020-12-22 | End: 2021-03-12 | Stop reason: SDUPTHER

## 2021-01-28 DIAGNOSIS — Z00.00 ROUTINE ADULT HEALTH MAINTENANCE: Primary | ICD-10-CM

## 2021-01-28 DIAGNOSIS — Z00.00 ROUTINE ADULT HEALTH MAINTENANCE: ICD-10-CM

## 2021-01-30 LAB
ALBUMIN SERPL-MCNC: 4 G/DL (ref 3.5–5.2)
ALBUMIN/GLOB SERPL: 1.5 G/DL
ALP SERPL-CCNC: 67 U/L (ref 39–117)
ALT SERPL-CCNC: 21 U/L (ref 1–33)
AST SERPL-CCNC: 23 U/L (ref 1–32)
BILIRUB SERPL-MCNC: 0.3 MG/DL (ref 0–1.2)
BUN SERPL-MCNC: 11 MG/DL (ref 6–20)
BUN/CREAT SERPL: 16.4 (ref 7–25)
CALCIUM SERPL-MCNC: 8.9 MG/DL (ref 8.6–10.5)
CHLORIDE SERPL-SCNC: 103 MMOL/L (ref 98–107)
CHOLEST SERPL-MCNC: 185 MG/DL (ref 0–200)
CO2 SERPL-SCNC: 27.8 MMOL/L (ref 22–29)
CREAT SERPL-MCNC: 0.67 MG/DL (ref 0.57–1)
ERYTHROCYTE [DISTWIDTH] IN BLOOD BY AUTOMATED COUNT: 13.7 % (ref 12.3–15.4)
GLOBULIN SER CALC-MCNC: 2.7 GM/DL
GLUCOSE SERPL-MCNC: 91 MG/DL (ref 65–99)
HCT VFR BLD AUTO: 37.7 % (ref 34–46.6)
HDLC SERPL-MCNC: 52 MG/DL (ref 40–60)
HGB BLD-MCNC: 12.7 G/DL (ref 12–15.9)
LDLC SERPL CALC-MCNC: 107 MG/DL (ref 0–100)
MCH RBC QN AUTO: 29.1 PG (ref 26.6–33)
MCHC RBC AUTO-ENTMCNC: 33.7 G/DL (ref 31.5–35.7)
MCV RBC AUTO: 86.5 FL (ref 79–97)
PLATELET # BLD AUTO: 305 10*3/MM3 (ref 140–450)
POTASSIUM SERPL-SCNC: 4.2 MMOL/L (ref 3.5–5.2)
PROT SERPL-MCNC: 6.7 G/DL (ref 6–8.5)
RBC # BLD AUTO: 4.36 10*6/MM3 (ref 3.77–5.28)
SODIUM SERPL-SCNC: 139 MMOL/L (ref 136–145)
TRIGL SERPL-MCNC: 147 MG/DL (ref 0–150)
VLDLC SERPL CALC-MCNC: 26 MG/DL (ref 5–40)
WBC # BLD AUTO: 6.67 10*3/MM3 (ref 3.4–10.8)

## 2021-03-12 ENCOUNTER — OFFICE VISIT (OUTPATIENT)
Dept: FAMILY MEDICINE CLINIC | Facility: CLINIC | Age: 31
End: 2021-03-12

## 2021-03-12 VITALS
BODY MASS INDEX: 44.2 KG/M2 | SYSTOLIC BLOOD PRESSURE: 138 MMHG | HEART RATE: 65 BPM | HEIGHT: 66 IN | OXYGEN SATURATION: 99 % | WEIGHT: 275 LBS | TEMPERATURE: 97.3 F | DIASTOLIC BLOOD PRESSURE: 84 MMHG

## 2021-03-12 DIAGNOSIS — J45.30 MILD PERSISTENT ASTHMA WITHOUT COMPLICATION: ICD-10-CM

## 2021-03-12 DIAGNOSIS — R45.7 EMOTIONAL STRESS: ICD-10-CM

## 2021-03-12 DIAGNOSIS — E66.01 MORBID (SEVERE) OBESITY DUE TO EXCESS CALORIES (HCC): ICD-10-CM

## 2021-03-12 DIAGNOSIS — Z00.00 ROUTINE ADULT HEALTH MAINTENANCE: Primary | ICD-10-CM

## 2021-03-12 PROCEDURE — 99395 PREV VISIT EST AGE 18-39: CPT | Performed by: FAMILY MEDICINE

## 2021-03-12 RX ORDER — ALBUTEROL SULFATE 90 UG/1
2 AEROSOL, METERED RESPIRATORY (INHALATION) EVERY 4 HOURS PRN
Qty: 18 G | Refills: 1 | Status: SHIPPED | OUTPATIENT
Start: 2021-03-12

## 2021-03-12 RX ORDER — SERTRALINE HYDROCHLORIDE 25 MG/1
25 TABLET, FILM COATED ORAL DAILY
Qty: 90 TABLET | Refills: 3 | Status: SHIPPED | OUTPATIENT
Start: 2021-03-12 | End: 2022-06-03 | Stop reason: SDUPTHER

## 2021-03-12 RX ORDER — BECLOMETHASONE DIPROPIONATE HFA 40 UG/1
2 AEROSOL, METERED RESPIRATORY (INHALATION) 2 TIMES DAILY
Qty: 10.6 G | Refills: 5 | Status: SHIPPED | OUTPATIENT
Start: 2021-03-12 | End: 2022-06-03 | Stop reason: SDUPTHER

## 2021-03-12 RX ORDER — PHENTERMINE HYDROCHLORIDE 37.5 MG/1
37.5 TABLET ORAL
Qty: 30 TABLET | Refills: 0 | Status: SHIPPED | OUTPATIENT
Start: 2021-03-12 | End: 2021-04-16 | Stop reason: SDUPTHER

## 2021-03-12 NOTE — PROGRESS NOTES
"  Chief Complaint   Patient presents with   • Annual Exam   • Hypertension     elevated bp       Subjective   Paola Neves is a 31 y.o. female and is here for a yearly physical exam. The patient reports problems - infertility, needs to lose weight.    Do you take any herbs or supplements that were not prescribed by a doctor? yes. If so, these will be added to active medication list.    The following portions of the patient's history were reviewed and updated as appropriate: allergies, current medications, past family history, past medical history, past social history, past surgical history and problem list.    Social and Family and Surgical History reviewed and updated today, see Rooming tab.    Health History, Preventive Measures and Vaccination flow sheets reviewed and updated today.    Patient's current medical chart in Epic; including previous office notes, Mychart messages, recent phone calls, imaging, labs, specialist's evaluation either in notes or in Media tab reviewed today.    Other outside pertinent medical information also reviewed thru Care Everywhere function is also reviewed today.    Review of Systems  Review of Systems  A comprehensive review of systems was negative except for: Respiratory: positive for asthma    Vitals:    03/12/21 1111   BP: 138/84   BP Location: Left arm   Patient Position: Sitting   Cuff Size: Large Adult   Pulse: 65   Temp: 97.3 °F (36.3 °C)   TempSrc: Temporal   SpO2: 99%   Weight: 125 kg (275 lb)   Height: 167.6 cm (65.98\")       General Appearance:  Alert, cooperative, no distress, appears stated age   Head:  Normocephalic, without obvious abnormality, atraumatic   Eyes:  PERRL, conjunctiva/corneas clear, EOM's intact.   Ears:  Normal TM's and external ear canals, both ears   Nose: Nares normal, septum midline, mucosa normal, no drainage or sinus tenderness   Throat: Lips, mucosa, and tongue normal; teeth and gums normal   Neck: Supple, symmetrical, trachea midline, no " adenopathy;   thyroid: No enlargement/tenderness/nodules; no carotid  bruit   Back:  Symmetric, no curvature, ROM normal, no CVA tenderness   Lungs:  Clear to auscultation bilaterally, respirations unlabored   Chest wall:  No tenderness or deformity   Heart:  Regular rate and rhythm, S1 and S2 normal, no murmur, rub or gallop   Abdomen:  Soft, non-tender, bowel sounds active all four quadrants,   no masses, no organomegaly   Rectal:        Extremities: Extremities normal, atraumatic, no cyanosis or edema   Pulses: 2+ and symmetric all extremities   Skin: Skin color, texture, turgor normal, no rashes or lesions   Lymph nodes: Cervical, supraclavicular, and axillary nodes normal   Neurologic: CNII-XII intact. Normal strength, sensation and reflexes   throughout          Results for orders placed or performed in visit on 01/28/21   CBC (No Diff)    Specimen: Blood   Result Value Ref Range    WBC 6.67 3.40 - 10.80 10*3/mm3    RBC 4.36 3.77 - 5.28 10*6/mm3    Hemoglobin 12.7 12.0 - 15.9 g/dL    Hematocrit 37.7 34.0 - 46.6 %    MCV 86.5 79.0 - 97.0 fL    MCH 29.1 26.6 - 33.0 pg    MCHC 33.7 31.5 - 35.7 g/dL    RDW 13.7 12.3 - 15.4 %    Platelets 305 140 - 450 10*3/mm3   Comprehensive metabolic panel    Specimen: Blood   Result Value Ref Range    Glucose 91 65 - 99 mg/dL    BUN 11 6 - 20 mg/dL    Creatinine 0.67 0.57 - 1.00 mg/dL    eGFR Non African Am 103 >60 mL/min/1.73    eGFR African Am 125 >60 mL/min/1.73    BUN/Creatinine Ratio 16.4 7.0 - 25.0    Sodium 139 136 - 145 mmol/L    Potassium 4.2 3.5 - 5.2 mmol/L    Chloride 103 98 - 107 mmol/L    Total CO2 27.8 22.0 - 29.0 mmol/L    Calcium 8.9 8.6 - 10.5 mg/dL    Total Protein 6.7 6.0 - 8.5 g/dL    Albumin 4.00 3.50 - 5.20 g/dL    Globulin 2.7 gm/dL    A/G Ratio 1.5 g/dL    Total Bilirubin 0.3 0.0 - 1.2 mg/dL    Alkaline Phosphatase 67 39 - 117 U/L    AST (SGOT) 23 1 - 32 U/L    ALT (SGPT) 21 1 - 33 U/L   Lipid Panel    Specimen: Blood   Result Value Ref Range    Total  Cholesterol 185 0 - 200 mg/dL    Triglycerides 147 0 - 150 mg/dL    HDL Cholesterol 52 40 - 60 mg/dL    VLDL Cholesterol Guille 26 5 - 40 mg/dL    LDL Chol Calc ([facility]) 107 (H) 0 - 100 mg/dL     Assessment/Plan   Healthy female exam.  Diagnoses and all orders for this visit:    1. Routine adult health maintenance (Primary)  -     albuterol sulfate  (90 Base) MCG/ACT inhaler; Inhale 2 puffs Every 4 (Four) Hours As Needed for Wheezing.  Dispense: 18 g; Refill: 1    2. Mild persistent asthma without complication  -     Beclomethasone Diprop HFA (Qvar RediHaler) 40 MCG/ACT inhaler; Inhale 2 puffs 2 (Two) Times a Day.  Dispense: 10.6 g; Refill: 5    3. Emotional stress  -     sertraline (Zoloft) 25 MG tablet; Take 1 tablet by mouth Daily.  Dispense: 90 tablet; Refill: 3    4. Morbid (severe) obesity due to excess calories (CMS/[facility])  -     phentermine (Adipex-P) 37.5 MG tablet; Take 1 tablet by mouth Every Morning Before Breakfast.  Dispense: 30 tablet; Refill: 0      1. Labs ok  Start Adipex.  Renew zoloft due to infertility stress.    2. Patient Counseling:  --Nutrition: Stressed importance of moderation in sodium/caffeine intake, saturated fat and cholesterol.  Discussed caloric balance, sufficient intake of fresh fruits, vegetables, fiber, calcium, iron.  --Exercise: Stressed the importance of regular exercise.   --Substance Abuse: Discussed cessation/primary prevention of tobacco, alcohol, or other drug use; driving or other dangerous activities under the influence.    --Dental health: Discussed importance of regular tooth brushing, flossing, and dental visits.  --Suggested having eyes and vision checked if needed or past due.  --Immunizations reviewed and given if needed.  --  3. Discussed the patient's BMI with her.  The BMI is above average; BMI management plan is completed  4. Follow up in 1 month    There are no Patient Instructions on file for this visit.    Medications Discontinued During This Encounter    Medication Reason   • Qvar RediHaler 40 MCG/ACT inhaler Reorder   • sertraline (Zoloft) 25 MG tablet Reorder        Dr. Luis Armando Kidd MD  [place], [place]  [facility]

## 2021-04-14 ENCOUNTER — OFFICE VISIT (OUTPATIENT)
Dept: OBSTETRICS AND GYNECOLOGY | Facility: CLINIC | Age: 31
End: 2021-04-14

## 2021-04-14 VITALS
WEIGHT: 265.2 LBS | BODY MASS INDEX: 42.62 KG/M2 | HEIGHT: 66 IN | DIASTOLIC BLOOD PRESSURE: 102 MMHG | SYSTOLIC BLOOD PRESSURE: 168 MMHG

## 2021-04-14 DIAGNOSIS — N97.0 INFERTILITY ASSOCIATED WITH ANOVULATION: ICD-10-CM

## 2021-04-14 DIAGNOSIS — Z13.9 SCREENING FOR CONDITION: Primary | ICD-10-CM

## 2021-04-14 DIAGNOSIS — L03.90 CELLULITIS, UNSPECIFIED CELLULITIS SITE: ICD-10-CM

## 2021-04-14 LAB
B-HCG UR QL: NEGATIVE
BILIRUB BLD-MCNC: NEGATIVE MG/DL
CLARITY, POC: CLEAR
COLOR UR: YELLOW
GLUCOSE UR STRIP-MCNC: NEGATIVE MG/DL
INTERNAL NEGATIVE CONTROL: NEGATIVE
INTERNAL POSITIVE CONTROL: POSITIVE
KETONES UR QL: NEGATIVE
LEUKOCYTE EST, POC: NEGATIVE
Lab: 55
NITRITE UR-MCNC: NEGATIVE MG/ML
PH UR: 5 [PH] (ref 5–8)
PROT UR STRIP-MCNC: NEGATIVE MG/DL
RBC # UR STRIP: NEGATIVE /UL
SP GR UR: 1 (ref 1–1.03)
UROBILINOGEN UR QL: NORMAL

## 2021-04-14 PROCEDURE — 99213 OFFICE O/P EST LOW 20 MIN: CPT | Performed by: OBSTETRICS & GYNECOLOGY

## 2021-04-14 PROCEDURE — 81025 URINE PREGNANCY TEST: CPT | Performed by: OBSTETRICS & GYNECOLOGY

## 2021-04-14 PROCEDURE — 81002 URINALYSIS NONAUTO W/O SCOPE: CPT | Performed by: OBSTETRICS & GYNECOLOGY

## 2021-04-14 RX ORDER — CLINDAMYCIN HYDROCHLORIDE 300 MG/1
300 CAPSULE ORAL 3 TIMES DAILY
Qty: 21 CAPSULE | Refills: 0 | Status: SHIPPED | OUTPATIENT
Start: 2021-04-14 | End: 2021-04-21

## 2021-04-14 NOTE — PROGRESS NOTES
"      Paola Neves is a 31 y.o. patient who presents for follow up of   Chief Complaint   Patient presents with   • Breast Pain     right breast pain     32 yo est pt here for emergency appt for R breast pain. She woke up 4 days ago with warmth and soreness in her R breast near the nipple. She has been using hot showers to help with the pain but this area is now firm and painful. She has not had any discharge, fevers or chills. She does not have MRSA as far as she knows. She saw BRET and did IVF in Jan and has 2 embryos. She is trying to lose 75 # before they decide to implant them and she has lost 23# thus far. Her AMH and egg quality were poor.         The following portions of the patient's history were reviewed and updated as appropriate: allergies, current medications and problem list.    Review of Systems   Constitutional: Positive for activity change and unexpected weight change.   Skin: Positive for wound.        R breast pain   All other systems reviewed and are negative.      BP (!) 168/102   Ht 167.6 cm (66\")   Wt 120 kg (265 lb 3.2 oz)   LMP 03/26/2021 (Exact Date)   BMI 42.80 kg/m²     Physical Exam  Vitals and nursing note reviewed.   Constitutional:       Appearance: Normal appearance. She is well-developed. She is obese.   HENT:      Head: Normocephalic and atraumatic.   Eyes:      General: No scleral icterus.     Conjunctiva/sclera: Conjunctivae normal.   Neck:      Thyroid: No thyromegaly.   Chest:      Breasts:         Right: Mass, skin change and tenderness present. No swelling, bleeding, inverted nipple or nipple discharge.         Abdominal:      Palpations: Abdomen is soft.   Skin:     General: Skin is warm and dry.   Neurological:      Mental Status: She is alert and oriented to person, place, and time.   Psychiatric:         Mood and Affect: Mood normal.         Behavior: Behavior normal.         Thought Content: Thought content normal.         Judgment: Judgment normal. "         A/P:  1. Cellulitis of R breast- Pt has PCN allergy and so we will Rx Clindamycin 300 mg TID x 7 days. Enc warm compressed TID to QID. Call for any worsening symptoms.   2. Infertility- s/p IVF, is holding off on implantation now to allow weight loss.   3. RHM- UTD annual 6/2020.   4. RTO 1-2 weeks recheck breast.     Assessment/Plan   Diagnoses and all orders for this visit:    1. Screening for condition (Primary)  -     POC Urinalysis Dipstick  -     POC Pregnancy, Urine    2. Cellulitis, unspecified cellulitis site    3. Infertility associated with anovulation    Other orders  -     clindamycin (Cleocin) 300 MG capsule; Take 1 capsule by mouth 3 (Three) Times a Day for 7 days.  Dispense: 21 capsule; Refill: 0                 No follow-ups on file.      Tanja Jeter MD    4/14/2021  09:41 EDT

## 2021-04-16 ENCOUNTER — BULK ORDERING (OUTPATIENT)
Dept: CASE MANAGEMENT | Facility: OTHER | Age: 31
End: 2021-04-16

## 2021-04-16 ENCOUNTER — OFFICE VISIT (OUTPATIENT)
Dept: FAMILY MEDICINE CLINIC | Facility: CLINIC | Age: 31
End: 2021-04-16

## 2021-04-16 VITALS
HEART RATE: 87 BPM | SYSTOLIC BLOOD PRESSURE: 124 MMHG | WEIGHT: 262 LBS | BODY MASS INDEX: 42.11 KG/M2 | OXYGEN SATURATION: 94 % | HEIGHT: 66 IN | TEMPERATURE: 97.7 F | DIASTOLIC BLOOD PRESSURE: 80 MMHG

## 2021-04-16 DIAGNOSIS — E66.01 MORBID (SEVERE) OBESITY DUE TO EXCESS CALORIES (HCC): ICD-10-CM

## 2021-04-16 DIAGNOSIS — Z23 IMMUNIZATION DUE: ICD-10-CM

## 2021-04-16 PROBLEM — N97.9 INFERTILITY, FEMALE: Status: ACTIVE | Noted: 2021-04-16

## 2021-04-16 PROCEDURE — 99213 OFFICE O/P EST LOW 20 MIN: CPT | Performed by: FAMILY MEDICINE

## 2021-04-16 RX ORDER — PHENTERMINE HYDROCHLORIDE 37.5 MG/1
37.5 TABLET ORAL
Qty: 30 TABLET | Refills: 0 | Status: SHIPPED | OUTPATIENT
Start: 2021-04-16 | End: 2021-05-14 | Stop reason: SDUPTHER

## 2021-04-16 NOTE — PROGRESS NOTES
Subjective   Paola Neves is a 31 y.o. female who is here for   Chief Complaint   Patient presents with   • Hypertension   • Weight Check   .     History of Present Illness   Nice weight loss  Goal is fertility  Adipex is helping  [place] [employer] exercise a few times a week    Finished covid vaccines      The following portions of the patient's history were reviewed and updated as appropriate: allergies, current medications, past family history, past medical history, past social history, past surgical history and problem list.    Review of Systems    Wt Readings from Last 3 Encounters:   04/21/21 120 kg (265 lb)   04/16/21 119 kg (262 lb)   04/14/21 120 kg (265 lb 3.2 oz)     Vitals:    04/16/21 1313   BP: 124/80   Pulse: 87   Temp: 97.7 °F (36.5 °C)   SpO2: 94%     Body mass index is 42.29 kg/m².      Objective   Physical Exam  Vitals reviewed.   Cardiovascular:      Rate and Rhythm: Normal rate.   Pulmonary:      Effort: Pulmonary effort is normal.   Neurological:      Mental Status: She is alert.   Psychiatric:         Mood and Affect: Mood normal.         Assessment/Plan   Diagnoses and all orders for this visit:    1. Morbid (severe) obesity due to excess calories (CMS/[facility])  -     phentermine (Adipex-P) 37.5 MG tablet; Take 1 tablet by mouth Every Morning Before Breakfast.  Dispense: 30 tablet; Refill: 0      There are no Patient Instructions on file for this visit.    Medications Discontinued During This Encounter   Medication Reason   • phentermine (Adipex-P) 37.5 MG tablet Reorder        Return in about 1 month (around 5/16/2021).    Dr. Luis Armando Kidd  [place], [place]

## 2021-04-21 ENCOUNTER — OFFICE VISIT (OUTPATIENT)
Dept: OBSTETRICS AND GYNECOLOGY | Facility: CLINIC | Age: 31
End: 2021-04-21

## 2021-04-21 VITALS
SYSTOLIC BLOOD PRESSURE: 126 MMHG | DIASTOLIC BLOOD PRESSURE: 70 MMHG | WEIGHT: 265 LBS | BODY MASS INDEX: 42.59 KG/M2 | HEIGHT: 66 IN

## 2021-04-21 DIAGNOSIS — L03.90 CELLULITIS, UNSPECIFIED CELLULITIS SITE: Primary | ICD-10-CM

## 2021-04-21 PROCEDURE — 99212 OFFICE O/P EST SF 10 MIN: CPT | Performed by: OBSTETRICS & GYNECOLOGY

## 2021-04-21 NOTE — PROGRESS NOTES
"      Paola Neves is a 31 y.o. patient who presents for follow up of   Chief Complaint   Patient presents with   • Follow-up     30 yo est pt here for f/u R breast cellulitis. She was seen on 4/14/2021 and had R breast tenderness, pain and induration near the nipple. She was started on Clinda and within 24 hours this area had decreased in size and was less painful. It has almost fully resolved and is not hurting anymore.         The following portions of the patient's history were reviewed and updated as appropriate: allergies, current medications and problem list.    Review of Systems   Constitutional: Positive for activity change and unexpected weight change.   Skin: Positive for wound.        R breast pain resolved   All other systems reviewed and are negative.      /70   Ht 167.6 cm (66\")   Wt 120 kg (265 lb)   LMP 04/20/2021   Breastfeeding No   BMI 42.77 kg/m²     Physical Exam  Vitals and nursing note reviewed.   Constitutional:       Appearance: Normal appearance. She is well-developed. She is obese.   HENT:      Head: Normocephalic and atraumatic.   Eyes:      General: No scleral icterus.     Conjunctiva/sclera: Conjunctivae normal.   Neck:      Thyroid: No thyromegaly.   Chest:      Breasts:         Right: Skin change present. No swelling, bleeding, inverted nipple, mass, nipple discharge or tenderness.         Left: No swelling, bleeding, inverted nipple, mass, nipple discharge, skin change or tenderness.       Skin:     General: Skin is warm and dry.   Neurological:      Mental Status: She is alert and oriented to person, place, and time.   Psychiatric:         Mood and Affect: Mood normal.         Behavior: Behavior normal.         Thought Content: Thought content normal.         Judgment: Judgment normal.         A/P:  1. H/O R breast cellulitis- resolving on exam. Pt feels better and is improved on clinical exam.   2. RHm- RTO 6/2021 annual or prn.     Assessment/Plan   Diagnoses and " all orders for this visit:    1. Cellulitis, unspecified cellulitis site (Primary)                 No follow-ups on file.      Tanja Jeter MD    4/21/2021  12:12 EDT

## 2021-05-14 ENCOUNTER — OFFICE VISIT (OUTPATIENT)
Dept: FAMILY MEDICINE CLINIC | Facility: CLINIC | Age: 31
End: 2021-05-14

## 2021-05-14 VITALS
OXYGEN SATURATION: 100 % | WEIGHT: 256 LBS | SYSTOLIC BLOOD PRESSURE: 138 MMHG | HEIGHT: 66 IN | HEART RATE: 76 BPM | DIASTOLIC BLOOD PRESSURE: 80 MMHG | TEMPERATURE: 97.7 F | BODY MASS INDEX: 41.14 KG/M2

## 2021-05-14 DIAGNOSIS — E66.01 CLASS 3 SEVERE OBESITY DUE TO EXCESS CALORIES WITH SERIOUS COMORBIDITY AND BODY MASS INDEX (BMI) OF 40.0 TO 44.9 IN ADULT (HCC): Primary | ICD-10-CM

## 2021-05-14 DIAGNOSIS — N97.9 INFERTILITY, FEMALE: ICD-10-CM

## 2021-05-14 PROBLEM — E66.813 CLASS 3 SEVERE OBESITY DUE TO EXCESS CALORIES WITH SERIOUS COMORBIDITY AND BODY MASS INDEX (BMI) OF 40.0 TO 44.9 IN ADULT: Status: ACTIVE | Noted: 2021-03-12

## 2021-05-14 PROCEDURE — 99213 OFFICE O/P EST LOW 20 MIN: CPT | Performed by: FAMILY MEDICINE

## 2021-05-14 RX ORDER — PHENTERMINE HYDROCHLORIDE 37.5 MG/1
37.5 TABLET ORAL
Qty: 30 TABLET | Refills: 0 | Status: SHIPPED | OUTPATIENT
Start: 2021-05-14 | End: 2021-06-11 | Stop reason: SDUPTHER

## 2021-05-14 NOTE — PROGRESS NOTES
"  Subjective   Paola Neves is a 31 y.o. female who is here for   Chief Complaint   Patient presents with   • Weight Check   .     History of Present Illness   Nice 9 lb weight loss  Some dry mouth with Apidex  [place]ach body home exercise  Now walking outside  weight goal is 250 for  embryo implant.    The following portions of the patient's history were reviewed and updated as appropriate: allergies, current medications, past family history, past medical history, past social history, past surgical history and problem list.    Review of Systems    Objective   Vitals:    05/14/21 1049   BP: 138/80   BP Location: Left arm   Patient Position: Sitting   Cuff Size: Large Adult   Pulse: 76   Temp: 97.7 °F (36.5 °C)   TempSrc: Temporal   SpO2: 100%   Weight: 116 kg (256 lb)   Height: 167.6 cm (66\")      Body mass index is 41.32 kg/m².    Physical Exam  Cardiovascular:      Rate and Rhythm: Normal rate.   Pulmonary:      Effort: Pulmonary effort is normal.   Neurological:      Mental Status: She is alert.   Psychiatric:         Mood and Affect: Mood normal.         Assessment/Plan   Diagnoses and all orders for this visit:    1. Class 3 severe obesity due to excess calories with serious comorbidity and body mass index (BMI) of 40.0 to 44.9 in adult (CMS/[facility]) (Primary)  -     phentermine (Adipex-P) 37.5 MG tablet; Take 1 tablet by mouth Every Morning Before Breakfast.  Dispense: 30 tablet; Refill: 0    2. Infertility, female      There are no Patient Instructions on file for this visit.    Medications Discontinued During This Encounter   Medication Reason   • phentermine (Adipex-P) 37.5 MG tablet Reorder        No follow-ups on file.    Dr. Luis Armando Kidd  [facility]  [place], [place]    "

## 2021-06-11 ENCOUNTER — OFFICE VISIT (OUTPATIENT)
Dept: FAMILY MEDICINE CLINIC | Facility: CLINIC | Age: 31
End: 2021-06-11

## 2021-06-11 VITALS
OXYGEN SATURATION: 100 % | HEART RATE: 80 BPM | HEIGHT: 66 IN | DIASTOLIC BLOOD PRESSURE: 82 MMHG | WEIGHT: 253 LBS | TEMPERATURE: 97.3 F | SYSTOLIC BLOOD PRESSURE: 120 MMHG | BODY MASS INDEX: 40.66 KG/M2

## 2021-06-11 DIAGNOSIS — E66.01 CLASS 3 SEVERE OBESITY DUE TO EXCESS CALORIES WITH SERIOUS COMORBIDITY AND BODY MASS INDEX (BMI) OF 40.0 TO 44.9 IN ADULT (HCC): ICD-10-CM

## 2021-06-11 PROCEDURE — 99213 OFFICE O/P EST LOW 20 MIN: CPT | Performed by: FAMILY MEDICINE

## 2021-06-11 RX ORDER — PHENTERMINE HYDROCHLORIDE 37.5 MG/1
37.5 TABLET ORAL
Qty: 30 TABLET | Refills: 0 | Status: SHIPPED | OUTPATIENT
Start: 2021-06-11 | End: 2021-07-12

## 2021-06-11 NOTE — PROGRESS NOTES
"  Subjective   Paola Neves is a 31 y.o. female who is here for   Chief Complaint   Patient presents with   • Weight Check   • Ear Fullness     right ear feels like it has fluid    .     History of Present Illness   3 lb weight loss  Exercise is in door [place] [employer] work outs  Calories are 1300 a day, uses a  calorie burak.  Goal is 240      The following portions of the patient's history were reviewed and updated as appropriate: allergies, current medications, past family history, past medical history, past social history, past surgical history and problem list.    Review of Systems    Objective   Vitals:    06/11/21 0950   BP: 120/82   BP Location: Left arm   Patient Position: Sitting   Cuff Size: Large Adult   Pulse: 80   Temp: 97.3 °F (36.3 °C)   TempSrc: Temporal   SpO2: 100%   Weight: 115 kg (253 lb)   Height: 167.6 cm (66\")      Physical Exam  Vitals reviewed.   Cardiovascular:      Rate and Rhythm: Normal rate.   Psychiatric:         Mood and Affect: Mood normal.       Body mass index is 40.84 kg/m².    Assessment/Plan   Diagnoses and all orders for this visit:    1. Class 3 severe obesity due to excess calories with serious comorbidity and body mass index (BMI) of 40.0 to 44.9 in adult (CMS/[facility])  -     phentermine (Adipex-P) 37.5 MG tablet; Take 1 tablet by mouth Every Morning Before Breakfast.  Dispense: 30 tablet; Refill: 0      There are no Patient Instructions on file for this visit.    Medications Discontinued During This Encounter   Medication Reason   • phentermine (Adipex-P) 37.5 MG tablet Reorder        No follow-ups on file.    Dr. Luis Armando Kidd  [place], [place]    "

## 2021-07-12 ENCOUNTER — OFFICE VISIT (OUTPATIENT)
Dept: FAMILY MEDICINE CLINIC | Facility: CLINIC | Age: 31
End: 2021-07-12

## 2021-07-12 VITALS
SYSTOLIC BLOOD PRESSURE: 132 MMHG | TEMPERATURE: 97.7 F | HEART RATE: 74 BPM | DIASTOLIC BLOOD PRESSURE: 80 MMHG | WEIGHT: 244 LBS | OXYGEN SATURATION: 100 % | HEIGHT: 66 IN | BODY MASS INDEX: 39.21 KG/M2

## 2021-07-12 DIAGNOSIS — E66.01 CLASS 3 SEVERE OBESITY DUE TO EXCESS CALORIES WITH SERIOUS COMORBIDITY AND BODY MASS INDEX (BMI) OF 40.0 TO 44.9 IN ADULT (HCC): ICD-10-CM

## 2021-07-12 DIAGNOSIS — N97.9 INFERTILITY, FEMALE: Primary | ICD-10-CM

## 2021-07-12 PROCEDURE — 99213 OFFICE O/P EST LOW 20 MIN: CPT | Performed by: FAMILY MEDICINE

## 2021-07-12 NOTE — PROGRESS NOTES
"  Subjective   Paola Neves is a 31 y.o. female who is here for   Chief Complaint   Patient presents with   • Weight Check   .     History of Present Illness   Is at her weight loss goal  Will start her homes mid August  And implant her and husbands fertilized embryo in August ( they have 2)  hormones are in the med list.  Still has 19 adipex left  So take these then stop.  May also want to stop Zoloft, as her depression is due to infertility.      The following portions of the patient's history were reviewed and updated as appropriate: allergies, current medications, past family history, past medical history, past social history, past surgical history and problem list.    Review of Systems    Objective   Vitals:    07/12/21 0940   BP: 132/80   BP Location: Left arm   Patient Position: Sitting   Cuff Size: Large Adult   Pulse: 74   Temp: 97.7 °F (36.5 °C)   TempSrc: Temporal   SpO2: 100%   Weight: 111 kg (244 lb)   Height: 167.6 cm (66\")      Physical Exam    Assessment/Plan   Diagnoses and all orders for this visit:    1. Infertility, female (Primary)    2. Class 3 severe obesity due to excess calories with serious comorbidity and body mass index (BMI) of 40.0 to 44.9 in adult (CMS/[facility])    [place]/    There are no Patient Instructions on file for this visit.    Medications Discontinued During This Encounter   Medication Reason   • phentermine (Adipex-P) 37.5 MG tablet *Therapy completed        No follow-ups on file.    Dr. Luis Armando Kidd  [place], [place]    "

## 2021-10-20 ENCOUNTER — OFFICE VISIT (OUTPATIENT)
Dept: OBSTETRICS AND GYNECOLOGY | Facility: CLINIC | Age: 31
End: 2021-10-20

## 2021-10-20 VITALS
BODY MASS INDEX: 41.3 KG/M2 | SYSTOLIC BLOOD PRESSURE: 132 MMHG | HEIGHT: 66 IN | DIASTOLIC BLOOD PRESSURE: 82 MMHG | WEIGHT: 257 LBS

## 2021-10-20 DIAGNOSIS — Z11.51 SPECIAL SCREENING EXAMINATION FOR HUMAN PAPILLOMAVIRUS (HPV): ICD-10-CM

## 2021-10-20 DIAGNOSIS — Z01.419 ROUTINE GYNECOLOGICAL EXAMINATION: ICD-10-CM

## 2021-10-20 DIAGNOSIS — Z01.419 PAP SMEAR, LOW-RISK: Primary | ICD-10-CM

## 2021-10-20 PROCEDURE — 99395 PREV VISIT EST AGE 18-39: CPT | Performed by: OBSTETRICS & GYNECOLOGY

## 2021-10-20 NOTE — PROGRESS NOTES
GYN Annual Exam     CC- Here for annual exam.     Paola Neves is a 31 y.o. female established patient who presents for annual well woman exam. She saw Dr Kessler and did IVF and had an embryo implanted this August and had an SAB, no D&C. They still have one embryo left but are undecided about when to go forward.   2017- LGSIL, CIERA 1 on bx  2017- normal pap  2018- LGSIL  2018- ASCUS  +HPV, CIERA 1 on bx  2019- normal pap  2019 - ASCUS neg HPV.  2020- normal pap, neg HPV    OB History        1    Para   0    Term   0       0    AB   1    Living   0       SAB   1    IAB   0    Ectopic   0    Molar        Multiple   0    Live Births              Obstetric Comments   Desires pregnancy  2021- IVF, embryo transfer with SAB, no D&C             Menarche: 13  Current contraception: none  History of abnormal Pap smear: yes -  see above  History of abnormal mammogram: no  Family history of uterine, colon or ovarian cancer: no  Family history of breast cancer: no  H/o STDs: none  Gardasil: declined  Last pap:2020- normal pap/HPV  Gardasil:refuses  DUANE: none   Infertility  PCOS w/u neg      Health Maintenance   Topic Date Due   • Pneumococcal Vaccine 0-64 (1 of 2 - PPSV23) Never done   • HEPATITIS C SCREENING  Never done   • Annual Gynecologic Pelvic and Breast Exam  2021   • INFLUENZA VACCINE  Never done   • ANNUAL PHYSICAL  2022   • PAP SMEAR  2023   • TDAP/TD VACCINES (2 - Td or Tdap) 2028   • COVID-19 Vaccine  Completed       Past Medical History:   Diagnosis Date   • Abnormal Pap smear of cervix     2017 LGSIL/CIERA 1, 2017 normal   • Anxiety    • Asthma    • Depression    • Female infertility        Past Surgical History:   Procedure Laterality Date   • WISDOM TOOTH EXTRACTION     • WISDOM TOOTH EXTRACTION           Current Outpatient Medications:   •  albuterol sulfate  (90 Base) MCG/ACT inhaler, Inhale 2 puffs Every 4 (Four) Hours As Needed for Wheezing.,  Disp: 18 g, Rfl: 1  •  Beclomethasone Diprop HFA (Qvar RediHaler) 40 MCG/ACT inhaler, Inhale 2 puffs 2 (Two) Times a Day., Disp: 10.6 g, Rfl: 5  •  cetirizine (ZyrTEC) 10 MG tablet, Take 10 mg by mouth daily., Disp: , Rfl:   •  Prenatal MV-Min-Fe Fum-FA-DHA (PRENATAL 1 PO), Take 1 capsule by mouth Every Morning., Disp: , Rfl:   •  sertraline (Zoloft) 25 MG tablet, Take 1 tablet by mouth Daily., Disp: 90 tablet, Rfl: 3    Allergies   Allergen Reactions   • Penicillins Hives     Broke out in a rash in kindergarden   • Sulfa Antibiotics Hives     Hives in college age       Social History     Tobacco Use   • Smoking status: Never Smoker   • Smokeless tobacco: Never Used   Vaping Use   • Vaping Use: Never used   Substance Use Topics   • Alcohol use: Yes     Alcohol/week: 1.0 standard drink     Types: 1 Glasses of wine per week     Comment: 1 a month   • Drug use: No       Family History   Problem Relation Age of Onset   • Thyroid disease Mother         Graves   • Ulcerative colitis Mother    • Hypertension Father    • Hearing loss Father    • Cancer Maternal Grandmother    • Asthma Paternal Grandmother    • Hearing loss Paternal Grandmother    • Heart disease Paternal Grandfather    • Diabetes Paternal Grandfather    • Cancer Paternal Grandfather    • No Known Problems Sister    • Ulcerative colitis Brother    • Breast cancer Neg Hx    • Ovarian cancer Neg Hx    • Uterine cancer Neg Hx    • Colon cancer Neg Hx    • Pulmonary embolism Neg Hx    • Deep vein thrombosis Neg Hx        Review of Systems   Constitutional: Positive for activity change. Negative for appetite change, fatigue, fever and unexpected weight change.   Eyes: Negative for photophobia and visual disturbance.   Respiratory: Negative for cough and shortness of breath.    Cardiovascular: Negative for chest pain and palpitations.   Gastrointestinal: Negative for abdominal distention, abdominal pain, constipation, diarrhea and nausea.   Endocrine: Negative for  "cold intolerance and heat intolerance.   Genitourinary: Negative for dyspareunia, dysuria, menstrual problem (heavy), pelvic pain, vaginal bleeding and vaginal discharge.   Musculoskeletal: Negative for back pain.   Skin: Negative for color change and rash.   Allergic/Immunologic: Positive for environmental allergies.   Neurological: Negative for headaches.   Hematological: Negative for adenopathy. Does not bruise/bleed easily.   Psychiatric/Behavioral: Positive for dysphoric mood. The patient is nervous/anxious.    All other systems reviewed and are negative.      /82   Ht 167.6 cm (66\")   Wt 117 kg (257 lb)   LMP 10/12/2021   Breastfeeding No   BMI 41.48 kg/m²     Physical Exam   Constitutional: She is oriented to person, place, and time. She appears well-developed.   HENT:   Head: Normocephalic and atraumatic.   Eyes: Conjunctivae are normal. No scleral icterus.   Neck: No thyromegaly present.   Cardiovascular: Normal rate and regular rhythm.   Pulmonary/Chest: Effort normal and breath sounds normal. Right breast exhibits no inverted nipple, no mass, no nipple discharge, no skin change and no tenderness. Left breast exhibits no inverted nipple, no mass, no nipple discharge, no skin change and no tenderness.   Abdominal: Soft. Normal appearance and bowel sounds are normal. She exhibits no distension and no mass. There is no abdominal tenderness. There is no rebound and no guarding. No hernia.   Genitourinary:    Pelvic exam was performed with patient supine.   There is no rash, tenderness or lesion on the right labia. There is no rash, tenderness or lesion on the left labia. Uterus is not deviated, not enlarged, not fixed and not tender. Cervix exhibits no motion tenderness, no discharge and no friability. Right adnexum displays no mass, no tenderness and no fullness. Left adnexum displays no mass, no tenderness and no fullness.    No vaginal discharge, erythema, tenderness or bleeding.   No erythema, " tenderness or bleeding in the vagina.    No foreign body in the vagina.      No signs of injury in the vagina.      Genitourinary Comments: Exam limited by habitus     Neurological: She is alert and oriented to person, place, and time.   Skin: Skin is warm and dry.   Psychiatric: Her behavior is normal. Mood, judgment and thought content normal.   Nursing note and vitals reviewed.         Assessment/Plan    1) GYN HM: check pap/HPV SBE demonstrated and encouraged. Will call with results and f/u  2) STD screening: declines Condoms encouraged.  3) Contraception: none  4) Family Planning:  encourage folic acid daily. She is A+ and RI. We discussed genetic carrier screening preconceptually and she declines for now. Info given.   5) Diet and Exercise discussed  6) Smoking Status: No  7) Social: , doing well.   8) MMG- plan age 40  9)Infertility- S/p IVF with Dr. Kessler  10) Anxiety and depression- on Zoloft 25 mg   11) I saw the patient with a face mask, gloves and a eye protection.  The patient herself was masked.  Social distancing was observed as appropriate.  12)Parts of this document have been copied or forwarded from her previous visits and have been reviewed, updated and edited as indicated.   13)Follow up prn or 1 year annual       Diagnoses and all orders for this visit:    1. Pap smear, low-risk (Primary)  -     POC Urinalysis Dipstick  -     IgP, Aptima HPV    2. Routine gynecological examination  -     POC Urinalysis Dipstick  -     IgP, Aptima HPV    3. Special screening examination for human papillomavirus (HPV)  -     POC Urinalysis Dipstick  -     IgP, Aptima HPV          Tanja Jeter MD  10/20/2021  12:45 EDT

## 2021-10-22 LAB
CYTOLOGIST CVX/VAG CYTO: NORMAL
CYTOLOGY CVX/VAG DOC CYTO: NORMAL
CYTOLOGY CVX/VAG DOC THIN PREP: NORMAL
DX ICD CODE: NORMAL
HIV 1 & 2 AB SER-IMP: NORMAL
HPV I/H RISK 4 DNA CVX QL PROBE+SIG AMP: NEGATIVE
OTHER STN SPEC: NORMAL
STAT OF ADQ CVX/VAG CYTO-IMP: NORMAL

## 2022-05-17 DIAGNOSIS — R45.7 EMOTIONAL STRESS: ICD-10-CM

## 2022-05-17 DIAGNOSIS — J45.30 MILD PERSISTENT ASTHMA WITHOUT COMPLICATION: ICD-10-CM

## 2022-05-18 RX ORDER — SERTRALINE HYDROCHLORIDE 25 MG/1
TABLET, FILM COATED ORAL
Qty: 90 TABLET | Refills: 3 | OUTPATIENT
Start: 2022-05-18

## 2022-05-18 RX ORDER — BECLOMETHASONE DIPROPIONATE HFA 40 UG/1
AEROSOL, METERED RESPIRATORY (INHALATION)
Qty: 10.6 EACH | OUTPATIENT
Start: 2022-05-18

## 2022-05-25 DIAGNOSIS — Z00.00 ROUTINE ADULT HEALTH MAINTENANCE: Primary | ICD-10-CM

## 2022-05-25 NOTE — PROGRESS NOTES
Routine labs are really not necessary of this patient at this time.  She prefers to have the fasting blood work that is fine.  But I would not require it at this time

## 2022-06-03 ENCOUNTER — OFFICE VISIT (OUTPATIENT)
Dept: FAMILY MEDICINE CLINIC | Facility: CLINIC | Age: 32
End: 2022-06-03

## 2022-06-03 VITALS
OXYGEN SATURATION: 99 % | SYSTOLIC BLOOD PRESSURE: 130 MMHG | BODY MASS INDEX: 44.36 KG/M2 | HEART RATE: 89 BPM | DIASTOLIC BLOOD PRESSURE: 88 MMHG | HEIGHT: 66 IN | TEMPERATURE: 96.9 F | WEIGHT: 276 LBS

## 2022-06-03 DIAGNOSIS — J45.30 MILD PERSISTENT ASTHMA WITHOUT COMPLICATION: ICD-10-CM

## 2022-06-03 DIAGNOSIS — Z00.00 ROUTINE ADULT HEALTH MAINTENANCE: Primary | ICD-10-CM

## 2022-06-03 DIAGNOSIS — R45.7 EMOTIONAL STRESS: ICD-10-CM

## 2022-06-03 PROCEDURE — 99395 PREV VISIT EST AGE 18-39: CPT | Performed by: FAMILY MEDICINE

## 2022-06-03 RX ORDER — BECLOMETHASONE DIPROPIONATE HFA 40 UG/1
2 AEROSOL, METERED RESPIRATORY (INHALATION) 2 TIMES DAILY
Qty: 10.6 G | Refills: 5 | Status: SHIPPED | OUTPATIENT
Start: 2022-06-03

## 2022-06-03 NOTE — PROGRESS NOTES
"  Chief Complaint   Patient presents with   • Annual Exam       Subjective   Paola Neves is a 32 y.o. female and is here for a yearly physical exam. The patient reports problems - She and  are going through in vitro fertilization.  Did have a early pregnancy, but the pregnancy terminated at approximately 4 weeks.  This occurred just recently..    Do you take any herbs or supplements that were not prescribed by a doctor? no. If so, these will be added to active medication list.    The following portions of the patient's history were reviewed and updated as appropriate: allergies, current medications, past family history, past medical history, past social history, past surgical history and problem list.    Social and Family and Surgical History reviewed and updated today, see Rooming tab.    Health History, Preventive Measures and Vaccination flow sheets reviewed and updated today.    Patient's current medical chart in Epic; including previous office notes, Protonethart messages, recent phone calls, imaging, labs, specialist's evaluation either in notes or in Media tab reviewed today.    Other outside pertinent medical information also reviewed thru Care Everywhere function is also reviewed today.    Review of Systems  Review of Systems  Pertinent items are noted in HPI.    Vitals:    06/03/22 1006   BP: 130/88   BP Location: Left arm   Patient Position: Sitting   Cuff Size: Large Adult   Pulse: 89   Temp: 96.9 °F (36.1 °C)   SpO2: 99%   Weight: 125 kg (276 lb)   Height: 167.6 cm (66\")     Body mass index is 44.55 kg/m².      General Appearance:  Alert, cooperative, no distress, appears stated age   Head:  Normocephalic, without obvious abnormality, atraumatic   Eyes:  PERRL, conjunctiva/corneas clear, EOM's intact.   Ears:  Normal TM's and external ear canals, both ears   Nose: Nares normal, septum midline, mucosa normal, no drainage or sinus tenderness   Throat: Lips, mucosa, and tongue normal; teeth and gums " normal   Neck: Supple, symmetrical, trachea midline, no adenopathy;   thyroid: No enlargement/tenderness/nodules; no carotid  bruit   Back:  Symmetric, no curvature, ROM normal, no CVA tenderness   Lungs:  Clear to auscultation bilaterally, respirations unlabored   Chest wall:  No tenderness or deformity   Heart:  Regular rate and rhythm, S1 and S2 normal, no murmur, rub or gallop   Abdomen:  Soft, non-tender, bowel sounds active all four quadrants,   no masses, no organomegaly   Rectal:        Extremities: Extremities normal, atraumatic, no cyanosis or edema   Pulses: 2+ and symmetric all extremities   Skin: Skin color, texture, turgor normal, no rashes or lesions   Lymph nodes: Cervical, supraclavicular, and axillary nodes normal   Neurologic: CNII-XII intact. Normal strength, sensation and reflexes   throughout            Assessment & Plan   Healthy female exam.  Diagnoses and all orders for this visit:    1. Routine adult health maintenance (Primary)    2. Emotional stress  -     sertraline (Zoloft) 50 MG tablet; Take 1 tablet by mouth Daily.  Dispense: 90 tablet; Refill: 1    3. Mild persistent asthma without complication  -     Beclomethasone Diprop HFA (Qvar RediHaler) 40 MCG/ACT inhaler; Inhale 2 puffs 2 (Two) Times a Day.  Dispense: 10.6 g; Refill: 5      1. ,  Asthma is stable.  We will increase her Zoloft to 50 mg due to recent early pregnancy loss    2. Patient Counseling:  --Nutrition: Stressed importance of moderation in sodium/caffeine intake, saturated fat and cholesterol.  Discussed caloric balance, sufficient intake of fresh fruits, vegetables, fiber, calcium, iron.  --Exercise: Stressed the importance of regular exercise.   --Substance Abuse: Discussed cessation/primary prevention of tobacco, alcohol, or other drug use; driving or other dangerous activities under the influence.    --Dental health: Discussed importance of regular tooth brushing, flossing, and dental visits.  --Suggested having eyes  and vision checked if needed or past due.  --Immunizations reviewed and given if needed.    3. Discussed the patient's BMI with her.  The BMI is above average; BMI management plan is completed  4. Follow up in one year    There are no Patient Instructions on file for this visit.    Medications Discontinued During This Encounter   Medication Reason   • Beclomethasone Diprop HFA (Qvar RediHaler) 40 MCG/ACT inhaler Reorder   • sertraline (Zoloft) 25 MG tablet Reorder        Dr. Luis Armando Kidd MD  [place], [place]  [facility]

## 2022-10-05 ENCOUNTER — OFFICE VISIT (OUTPATIENT)
Dept: FAMILY MEDICINE CLINIC | Facility: CLINIC | Age: 32
End: 2022-10-05

## 2022-10-05 VITALS
WEIGHT: 287 LBS | DIASTOLIC BLOOD PRESSURE: 80 MMHG | HEART RATE: 85 BPM | TEMPERATURE: 98.4 F | SYSTOLIC BLOOD PRESSURE: 130 MMHG | BODY MASS INDEX: 46.12 KG/M2 | OXYGEN SATURATION: 100 % | HEIGHT: 66 IN

## 2022-10-05 DIAGNOSIS — N97.9 INFERTILITY, FEMALE: Primary | ICD-10-CM

## 2022-10-05 DIAGNOSIS — N92.6 ABNORMAL MENSTRUAL CYCLE: ICD-10-CM

## 2022-10-05 DIAGNOSIS — E66.01 CLASS 3 SEVERE OBESITY DUE TO EXCESS CALORIES WITH SERIOUS COMORBIDITY AND BODY MASS INDEX (BMI) OF 45.0 TO 49.9 IN ADULT: ICD-10-CM

## 2022-10-05 PROCEDURE — 99214 OFFICE O/P EST MOD 30 MIN: CPT | Performed by: FAMILY MEDICINE

## 2022-10-05 NOTE — PROGRESS NOTES
"  Subjective   Paola Neves is a 32 y.o. female who is here for   Chief Complaint   Patient presents with   • Alopecia   • Weight Gain   • Headache   .   Answers for HPI/ROS submitted by the patient on 9/28/2022  Please describe your symptoms.: Losing a lot of hair, fatigue, weight gain, headaches  Have you had these symptoms before?: No  How long have you been having these symptoms?: Greater than 2 weeks  Please list any medications you are currently taking for this condition.: Ibuprofen  Please describe any probable cause for these symptoms. : Hormonal imbalance  What is the primary reason for your visit?: Other      Alopecia  Associated symptoms include headaches.   Headache     Several issues regarding infertility and obesity.  Her weight has gone up.  They had a in vitro fertilization last year.  Unfortunately lost a pregnancy.  Had lost some weight on phentermine, but she gained it back.  Therapy still with her infertility specialist and is thinking about having an embryo transplant.  That would mean somebody else is egg and somebody also sperm implanted into her uterus.  They thought about adoption and fostering as well        The following portions of the patient's history were reviewed and updated as appropriate: allergies, current medications, past family history, past medical history, past social history, past surgical history and problem list.    Review of Systems   Neurological: Positive for headaches.       Objective   Vitals:    10/05/22 1411   BP: 130/80   BP Location: Left arm   Patient Position: Sitting   Cuff Size: Large Adult   Pulse: 85   Temp: 98.4 °F (36.9 °C)   SpO2: 100%   Weight: 130 kg (287 lb)   Height: 167.6 cm (66\")      Physical Exam  Vitals reviewed.   HENT:      Head:     Cardiovascular:      Rate and Rhythm: Normal rate.   Pulmonary:      Effort: Pulmonary effort is normal.   Neurological:      Mental Status: She is alert.         Assessment & Plan   Diagnoses and all orders " for this visit:    1. Infertility, female (Primary)  -     TSH  -     T4, Free  -     T3  -     Cortisol  -     Hemoglobin A1c  -     Follicle Stimulating Hormone  -     Luteinizing Hormone  -     Estrogens, Total  -     Testosterone, Free, Total  -     Progesterone  -     Antimullerian Hormone (AMH)  -     Prolactin    2. Class 3 severe obesity due to excess calories with serious comorbidity and body mass index (BMI) of 45.0 to 49.9 in adult (HCC)  -     TSH  -     T4, Free  -     T3  -     Cortisol  -     Hemoglobin A1c  -     Follicle Stimulating Hormone  -     Luteinizing Hormone  -     Estrogens, Total  -     Testosterone, Free, Total  -     Progesterone  -     Antimullerian Hormone (AMH)  -     Prolactin    3. Abnormal menstrual cycle  -     TSH  -     T4, Free  -     T3  -     Cortisol  -     Hemoglobin A1c  -     Follicle Stimulating Hormone  -     Luteinizing Hormone  -     Estrogens, Total  -     Testosterone, Free, Total  -     Progesterone  -     Antimullerian Hormone (AMH)  -     Prolactin    Would not be able to prescribe in phentermine as there heading for the embryo implant  To discuss Lap-Band surgery which I think she will ultimately need.  But will have to wait until after there are no more pregnancies planned    There are no Patient Instructions on file for this visit.    There are no discontinued medications.     No follow-ups on file.    Dr. Luis Armando Kidd  [facility]  [place], [place]

## 2022-10-09 LAB
CORTIS SERPL-MCNC: 10.6 UG/DL
ESTROGEN SERPL-MCNC: 122 PG/ML
FSH SERPL-ACNC: 5.4 MIU/ML
HBA1C MFR BLD: 5.3 % (ref 4.8–5.6)
LH SERPL-ACNC: 8.6 MIU/ML
MIS SERPL-MCNC: 0.76 NG/ML
PROGEST SERPL-MCNC: 0.2 NG/ML
PROLACTIN SERPL-MCNC: 8.9 NG/ML (ref 4.8–23.3)
T3 SERPL-MCNC: 116 NG/DL (ref 80–200)
T4 FREE SERPL-MCNC: 1.35 NG/DL (ref 0.93–1.7)
TESTOST FREE SERPL-MCNC: 1 PG/ML (ref 0–4.2)
TESTOST SERPL-MCNC: 10 NG/DL (ref 8–60)
TSH SERPL DL<=0.005 MIU/L-ACNC: 0.75 UIU/ML (ref 0.27–4.2)

## 2023-01-18 ENCOUNTER — OFFICE VISIT (OUTPATIENT)
Dept: OBSTETRICS AND GYNECOLOGY | Facility: CLINIC | Age: 33
End: 2023-01-18
Payer: COMMERCIAL

## 2023-01-18 VITALS
DIASTOLIC BLOOD PRESSURE: 100 MMHG | HEIGHT: 66 IN | BODY MASS INDEX: 46.25 KG/M2 | SYSTOLIC BLOOD PRESSURE: 180 MMHG | WEIGHT: 287.8 LBS

## 2023-01-18 DIAGNOSIS — Z71.85 HPV VACCINE COUNSELING: ICD-10-CM

## 2023-01-18 DIAGNOSIS — N94.6 DYSMENORRHEA: ICD-10-CM

## 2023-01-18 DIAGNOSIS — N97.9 INFERTILITY, FEMALE: ICD-10-CM

## 2023-01-18 DIAGNOSIS — L65.9 ALOPECIA: Primary | ICD-10-CM

## 2023-01-18 DIAGNOSIS — Z01.419 ROUTINE GYNECOLOGICAL EXAMINATION: ICD-10-CM

## 2023-01-18 PROCEDURE — 99395 PREV VISIT EST AGE 18-39: CPT | Performed by: OBSTETRICS & GYNECOLOGY

## 2023-01-18 PROCEDURE — 99214 OFFICE O/P EST MOD 30 MIN: CPT | Performed by: OBSTETRICS & GYNECOLOGY

## 2023-01-18 NOTE — PROGRESS NOTES
GYN Annual Exam     CC- Here for annual exam.     Paola Neves is a 32 y.o. female established patient who presents for annual well woman exam. She saw Dr Kessler and did IVF and had an embryo implanted last year and had an SAB, no D&C. They implanted their last embryo but had an SAB again. They are on the list for embryo adoption. Her cycles are regular every 26 days but are becoming  more painful. She is also having focal hair loss on her head and now has a large bald spot. She is not interested in Gardasil vaccination.   2017- LGSIL, CIERA 1 on bx  2017- normal pap  2018- LGSIL  2018- ASCUS  +HPV, CIERA 1 on bx  2019- normal pap  2019 - ASCUS neg HPV.  2020- normal pap, neg HPV  10/2021- normal pap, neg HPV    OB History        2    Para   0    Term   0       0    AB   2    Living   0       SAB   2    IAB   0    Ectopic   0    Molar        Multiple   0    Live Births              Obstetric Comments   Desires pregnancy  2021- IVF, embryo transfer with SAB, no D&C  2022- embryo transfer with SAB, no D&C             Menarche: 13  Current contraception: none  History of abnormal Pap smear: yes -  see above  History of abnormal mammogram: no  Family history of uterine, colon or ovarian cancer: no  Family history of breast cancer: no  H/o STDs: none  Gardasil: declined  Last pap:10/2021- normal pap/HPV  Gardasil:refuses  DUANE: none   Infertility  PCOS w/u neg      Health Maintenance   Topic Date Due   • HEPATITIS C SCREENING  Never done   • INFLUENZA VACCINE  Never done   • Annual Gynecologic Pelvic and Breast Exam  10/21/2022   • COVID-19 Vaccine (3 - Booster for Moderna series) 2023 (Originally 2021)   • Pneumococcal Vaccine 0-64 (1 - PCV) 2023 (Originally 1996)   • ANNUAL PHYSICAL  2023   • PAP SMEAR  10/20/2024   • TDAP/TD VACCINES (2 - Td or Tdap) 2028       Past Medical History:   Diagnosis Date   • Abnormal Pap smear of cervix     2017 LGSIL/CIERA  1, 11/2017 normal   • Asthma May 2007   • Female infertility    • Hypertension May 2016       Past Surgical History:   Procedure Laterality Date   • FERTILITY SURGERY  05/02/2022    IVF   • WISDOM TOOTH EXTRACTION           Current Outpatient Medications:   •  albuterol sulfate  (90 Base) MCG/ACT inhaler, Inhale 2 puffs Every 4 (Four) Hours As Needed for Wheezing., Disp: 18 g, Rfl: 1  •  Beclomethasone Diprop HFA (Qvar RediHaler) 40 MCG/ACT inhaler, Inhale 2 puffs 2 (Two) Times a Day., Disp: 10.6 g, Rfl: 5  •  cetirizine (ZyrTEC) 10 MG tablet, Take 10 mg by mouth daily., Disp: , Rfl:   •  Prenatal MV-Min-Fe Fum-FA-DHA (PRENATAL 1 PO), Take 1 capsule by mouth Every Morning., Disp: , Rfl:   •  sertraline (Zoloft) 50 MG tablet, Take 1 tablet by mouth Daily., Disp: 90 tablet, Rfl: 1    Allergies   Allergen Reactions   • Penicillins Hives     Broke out in a rash in kindergarden   • Sulfa Antibiotics Hives     Hives in college age       Social History     Tobacco Use   • Smoking status: Never   • Smokeless tobacco: Never   Vaping Use   • Vaping Use: Never used   Substance Use Topics   • Alcohol use: Yes     Alcohol/week: 1.0 standard drink     Types: 1 Glasses of wine per week     Comment: 1 a month   • Drug use: No       Family History   Problem Relation Age of Onset   • Thyroid disease Mother         Graves   • Ulcerative colitis Mother    • Hypertension Father    • Hearing loss Father    • Cancer Maternal Grandmother    • Asthma Paternal Grandmother    • Hearing loss Paternal Grandmother    • Heart disease Paternal Grandfather    • Diabetes Paternal Grandfather    • Cancer Paternal Grandfather    • No Known Problems Sister    • Ulcerative colitis Brother    • Breast cancer Neg Hx    • Ovarian cancer Neg Hx    • Uterine cancer Neg Hx    • Colon cancer Neg Hx    • Pulmonary embolism Neg Hx    • Deep vein thrombosis Neg Hx        Review of Systems   Constitutional: Positive for activity change. Negative for  "appetite change, fatigue, fever and unexpected weight change.   Eyes: Negative for photophobia and visual disturbance.   Respiratory: Negative for cough and shortness of breath.    Cardiovascular: Negative for chest pain and palpitations.   Gastrointestinal: Negative for abdominal distention, abdominal pain, constipation, diarrhea and nausea.   Endocrine: Negative for cold intolerance and heat intolerance.   Genitourinary: Positive for menstrual problem (heavy) and pelvic pain. Negative for dyspareunia, dysuria, vaginal bleeding and vaginal discharge.   Musculoskeletal: Negative for back pain.   Skin: Negative for color change and rash.        Hair loss   Allergic/Immunologic: Positive for environmental allergies.   Neurological: Negative for headaches.   Hematological: Negative for adenopathy. Does not bruise/bleed easily.   Psychiatric/Behavioral: Negative for dysphoric mood. The patient is not nervous/anxious.    All other systems reviewed and are negative.      /100   Ht 167.6 cm (66\")   Wt 131 kg (287 lb 12.8 oz)   LMP 12/28/2022 (Exact Date)   BMI 46.45 kg/m²     Physical Exam   Constitutional: She is oriented to person, place, and time. She appears well-developed.   HENT:   Head: Normocephalic and atraumatic.   Eyes: Conjunctivae are normal. No scleral icterus.   Neck: No thyromegaly present.   Cardiovascular: Normal rate and regular rhythm.   Pulmonary/Chest: Effort normal and breath sounds normal. Right breast exhibits no inverted nipple, no mass, no nipple discharge, no skin change and no tenderness. Left breast exhibits no inverted nipple, no mass, no nipple discharge, no skin change and no tenderness.   Abdominal: Soft. Normal appearance and bowel sounds are normal. She exhibits no distension and no mass. There is no abdominal tenderness. There is no rebound and no guarding. No hernia.   Genitourinary:    Pelvic exam was performed with patient supine.   There is no rash, tenderness or lesion on " the right labia. There is no rash, tenderness or lesion on the left labia. Uterus is not deviated, not enlarged, not fixed and not tender. Cervix exhibits no motion tenderness, no discharge and no friability. Right adnexum displays no mass, no tenderness and no fullness. Left adnexum displays no mass, no tenderness and no fullness.    No vaginal discharge, erythema, tenderness or bleeding.   No erythema, tenderness or bleeding in the vagina.    No foreign body in the vagina.      No signs of injury in the vagina.      Genitourinary Comments: Exam limited by habitus     Neurological: She is alert and oriented to person, place, and time.   Skin: Skin is warm and dry.   Psychiatric: Her behavior is normal. Mood, judgment and thought content normal.   Nursing note and vitals reviewed.         Assessment/Plan    1) GYN HM: normal pap/HPV 10/2021 SBE demonstrated and encouraged.   2) STD screening: declines Condoms encouraged.  3) Contraception: none  4) Family Planning:  encourage folic acid daily. She is A+ and RI. We discussed genetic carrier screening preconceptually and she declines for now. Info given.   5) Diet and Exercise discussed  6) Smoking Status: No  7) Social: , doing well.   8) MMG- plan age 40  9)Infertility- S/p IVF with Dr. Kessler has had 2 unsuccessful embryo transfers. Plans embryo adoption.   10) Anxiety and depression- on Zoloft 25 mg   11) Alopecia- check thyroid panel and TPO. Refer derm  12) Dysmenorrhea- schedule TVUS and appt in next 2-3 weeks  13) Discussed with patient risks, benefits and alternatives to the Gardasil vaccination.  The vaccine is administered in the arm in a series of 3 shots at 02 in 6 months.  It provides a 70 to 90% reduction in HPV related diseases such as abnormal Pap smears, genital warts and cervical cancer.  The vaccine was originally approved for ages 9-26, but is recently been expanded to the age of 45.  The most common side effects are pain at the injection  site and fainting.  Any and all adverse side effects are tracked by the FDA and are available on their website for review.  After consideration, the patient declines vaccination.  14)I saw the patient with a face mask, gloves and a eye protection.  The patient herself was masked.  Social distancing was observed as appropriate.  15)Parts of this document have been copied or forwarded from her previous visits and have been reviewed, updated and edited as indicated.   16)Follow up for US and  1 year annual  17)  I spent >30 minutes on the separately reported service of dysmenorrhea and alopecia. This time is not included in the time used to support the annual E/M service also reported today.      Diagnoses and all orders for this visit:    1. Alopecia (Primary)  -     Ambulatory Referral to Dermatology    2. Infertility, female    3. Routine gynecological examination    4. Dysmenorrhea    5. HPV vaccine counseling          Tanja Jeter MD  1/18/2023  17:35 EST

## 2023-02-13 ENCOUNTER — OFFICE VISIT (OUTPATIENT)
Dept: OBSTETRICS AND GYNECOLOGY | Facility: CLINIC | Age: 33
End: 2023-02-13
Payer: COMMERCIAL

## 2023-02-13 VITALS
DIASTOLIC BLOOD PRESSURE: 78 MMHG | BODY MASS INDEX: 46.16 KG/M2 | WEIGHT: 287.2 LBS | HEIGHT: 66 IN | SYSTOLIC BLOOD PRESSURE: 146 MMHG

## 2023-02-13 DIAGNOSIS — N97.9 INFERTILITY, FEMALE: ICD-10-CM

## 2023-02-13 DIAGNOSIS — N94.6 DYSMENORRHEA: Primary | ICD-10-CM

## 2023-02-13 DIAGNOSIS — L65.9 ALOPECIA: ICD-10-CM

## 2023-02-13 PROBLEM — Z00.00 ROUTINE ADULT HEALTH MAINTENANCE: Status: RESOLVED | Noted: 2017-02-24 | Resolved: 2023-02-13

## 2023-02-13 LAB
BILIRUB BLD-MCNC: NEGATIVE MG/DL
CLARITY, POC: CLEAR
COLOR UR: YELLOW
GLUCOSE UR STRIP-MCNC: NEGATIVE MG/DL
KETONES UR QL: NEGATIVE
LEUKOCYTE EST, POC: NEGATIVE
NITRITE UR-MCNC: NEGATIVE MG/ML
PH UR: 5 [PH] (ref 5–8)
PROT UR STRIP-MCNC: NEGATIVE MG/DL
RBC # UR STRIP: NEGATIVE /UL
SP GR UR: 1 (ref 1–1.03)
UROBILINOGEN UR QL: NORMAL

## 2023-02-13 PROCEDURE — 99214 OFFICE O/P EST MOD 30 MIN: CPT | Performed by: OBSTETRICS & GYNECOLOGY

## 2023-02-13 PROCEDURE — 81002 URINALYSIS NONAUTO W/O SCOPE: CPT | Performed by: OBSTETRICS & GYNECOLOGY

## 2023-02-13 RX ORDER — IBUPROFEN 800 MG/1
800 TABLET ORAL EVERY 8 HOURS PRN
Qty: 30 TABLET | Refills: 0 | Status: SHIPPED | OUTPATIENT
Start: 2023-02-13 | End: 2023-02-13 | Stop reason: SDUPTHER

## 2023-02-13 RX ORDER — IBUPROFEN 800 MG/1
800 TABLET ORAL EVERY 8 HOURS PRN
Qty: 30 TABLET | Refills: 0 | Status: SHIPPED | OUTPATIENT
Start: 2023-02-13

## 2023-02-13 NOTE — PROGRESS NOTES
"      Paola Neves is a 33 y.o. patient who presents for follow up of   Chief Complaint   Patient presents with   • Follow-up     US       32 yo est pt here for TVUS and dysmenorrhea. Her cycles are regular every 28 days but becoming more painful. She is now 5th on the list for embryo adoption and is not interested in being on anything for cycle control. Her US today shows a 6.8 cm AV uterus with an EL 0.6 cm. She has an anechoic area on the L ovary that measures 1.7 x 1.4 cm. Her R ovary is normal. Her US is compared to her last scan on 8/4/2019. She has seen derm and has alopecia arreata and is getting lesional injections. She is interested in an NSAID for her dysmenorrhea.       The following portions of the patient's history were reviewed and updated as appropriate: allergies, current medications and problem list.    Review of Systems   Constitutional: Positive for activity change. Negative for appetite change, fatigue, fever and unexpected weight change.   Eyes: Negative for photophobia and visual disturbance.   Respiratory: Negative for cough and shortness of breath.    Cardiovascular: Negative for chest pain and palpitations.   Gastrointestinal: Negative for abdominal distention, abdominal pain, constipation, diarrhea and nausea.   Endocrine: Negative for cold intolerance and heat intolerance.   Genitourinary: Positive for menstrual problem (heavy) and pelvic pain. Negative for dyspareunia, dysuria, vaginal bleeding and vaginal discharge.   Musculoskeletal: Negative for back pain.   Skin: Negative for color change and rash.        Hair loss   Allergic/Immunologic: Positive for environmental allergies.   Neurological: Negative for headaches.   Hematological: Negative for adenopathy. Does not bruise/bleed easily.   Psychiatric/Behavioral: Negative for dysphoric mood. The patient is not nervous/anxious.    All other systems reviewed and are negative.      /78   Ht 167.6 cm (65.98\")   Wt 130 kg (287 " lb 3.2 oz)   LMP 01/27/2023   BMI 46.38 kg/m²     Physical Exam  Vitals and nursing note reviewed.   Constitutional:       Appearance: Normal appearance. She is well-developed. She is obese.   HENT:      Head: Normocephalic and atraumatic.   Neck:      Thyroid: No thyromegaly.   Abdominal:      General: There is no distension.      Palpations: Abdomen is soft. There is no mass.      Tenderness: There is no abdominal tenderness. There is no guarding or rebound.      Hernia: No hernia is present.   Neurological:      Mental Status: She is alert and oriented to person, place, and time.   Psychiatric:         Mood and Affect: Mood normal.         Behavior: Behavior normal.         Thought Content: Thought content normal.         Judgment: Judgment normal.         A/P:  1. Dysmenorrhea- normal pelvic US. Pt declines any treatment for dysmenorrhea other than NSAIDS. .ERX Ibuprofen 800 mg TID prn.   2. Female infertility- on the list for embryo adoption. Advised pt not to use NSAIDS while undergoing ART  3. Alopecia- pt seeing derm.   4. RHM- RTO 1/2024 annual and/or prn.   5.Time Spent: I spent > 30 minutes caring for Paola on this date of service. This time includes time spent by me in the following activities: preparing for the visit, reviewing tests, obtaining and/or reviewing a separately obtained history, performing a medically appropriate examination and/or evaluation, counseling and educating the patient/family/caregiver, ordering medications, tests, or procedures, documenting information in the medical record and independently interpreting results and communicating that information with the patient/family/caregiver.       Assessment & Plan   Diagnoses and all orders for this visit:    1. Dysmenorrhea (Primary)  -     POC Urinalysis Dipstick    2. Infertility, female    3. Alopecia    Other orders  -     Discontinue: ibuprofen (ADVIL,MOTRIN) 800 MG tablet; Take 1 tablet by mouth Every 8 (Eight) Hours As Needed  for Moderate Pain (pain).  Dispense: 30 tablet; Refill: 0  -     ibuprofen (ADVIL,MOTRIN) 800 MG tablet; Take 1 tablet by mouth Every 8 (Eight) Hours As Needed for Moderate Pain (pain).  Dispense: 30 tablet; Refill: 0                 No follow-ups on file.      Tanja Jeter MD    2/13/2023  11:06 EST

## 2023-05-24 ENCOUNTER — PATIENT MESSAGE (OUTPATIENT)
Dept: FAMILY MEDICINE CLINIC | Facility: CLINIC | Age: 33
End: 2023-05-24
Payer: COMMERCIAL

## 2023-05-24 DIAGNOSIS — Z00.00 ROUTINE ADULT HEALTH MAINTENANCE: Primary | ICD-10-CM

## 2023-05-24 DIAGNOSIS — L65.9 ALOPECIA: ICD-10-CM

## 2023-05-25 NOTE — TELEPHONE ENCOUNTER
From: Paola Neves  To: Luis Armando Kidd  Sent: 5/24/2023 4:56 PM EDT  Subject: Annual exam    Hi Dr. Kidd, I have my annual exam coming up and I noticed that I do not have labs scheduled. For my annual last year you said you didn't need them.     I was diagnosed with Alopecia areata in January by a dermatologist so I was curious if I needed to get labs because of this new diagnosis.    Thanks  Paola

## 2023-05-26 DIAGNOSIS — Z00.00 ROUTINE ADULT HEALTH MAINTENANCE: ICD-10-CM

## 2023-05-26 DIAGNOSIS — L65.9 ALOPECIA: ICD-10-CM

## 2023-06-01 LAB
ALBUMIN SERPL-MCNC: 4.4 G/DL (ref 3.8–4.8)
ALBUMIN/GLOB SERPL: 1.8 {RATIO} (ref 1.2–2.2)
ALP SERPL-CCNC: 72 IU/L (ref 44–121)
ALT SERPL-CCNC: 25 IU/L (ref 0–32)
ANA SER QL: NEGATIVE
AST SERPL-CCNC: 22 IU/L (ref 0–40)
BILIRUB SERPL-MCNC: 0.4 MG/DL (ref 0–1.2)
BUN SERPL-MCNC: 13 MG/DL (ref 6–20)
BUN/CREAT SERPL: 16 (ref 9–23)
CALCIUM SERPL-MCNC: 9.3 MG/DL (ref 8.7–10.2)
CHLORIDE SERPL-SCNC: 106 MMOL/L (ref 96–106)
CHOLEST SERPL-MCNC: 161 MG/DL (ref 100–199)
CO2 SERPL-SCNC: 23 MMOL/L (ref 20–29)
CREAT SERPL-MCNC: 0.8 MG/DL (ref 0.57–1)
EGFRCR SERPLBLD CKD-EPI 2021: 100 ML/MIN/1.73
ERYTHROCYTE [DISTWIDTH] IN BLOOD BY AUTOMATED COUNT: 13 % (ref 11.7–15.4)
FERRITIN SERPL-MCNC: 65 NG/ML (ref 15–150)
GLOBULIN SER CALC-MCNC: 2.5 G/DL (ref 1.5–4.5)
GLUCOSE SERPL-MCNC: 91 MG/DL (ref 70–99)
HCT VFR BLD AUTO: 40.7 % (ref 34–46.6)
HDLC SERPL-MCNC: 39 MG/DL
HGB BLD-MCNC: 13.6 G/DL (ref 11.1–15.9)
IRON SATN MFR SERPL: 20 % (ref 15–55)
IRON SERPL-MCNC: 68 UG/DL (ref 27–159)
LDLC SERPL CALC-MCNC: 103 MG/DL (ref 0–99)
MCH RBC QN AUTO: 28.7 PG (ref 26.6–33)
MCHC RBC AUTO-ENTMCNC: 33.4 G/DL (ref 31.5–35.7)
MCV RBC AUTO: 86 FL (ref 79–97)
PLATELET # BLD AUTO: 252 X10E3/UL (ref 150–450)
POTASSIUM SERPL-SCNC: 4.6 MMOL/L (ref 3.5–5.2)
PROT SERPL-MCNC: 6.9 G/DL (ref 6–8.5)
RBC # BLD AUTO: 4.74 X10E6/UL (ref 3.77–5.28)
SODIUM SERPL-SCNC: 141 MMOL/L (ref 134–144)
TESTOST FREE SERPL-MCNC: 3.9 PG/ML (ref 0–4.2)
TESTOST SERPL-MCNC: 23 NG/DL (ref 8–60)
TIBC SERPL-MCNC: 345 UG/DL (ref 250–450)
TRIGL SERPL-MCNC: 102 MG/DL (ref 0–149)
TSH SERPL DL<=0.005 MIU/L-ACNC: 1.13 UIU/ML (ref 0.45–4.5)
UIBC SERPL-MCNC: 277 UG/DL (ref 131–425)
VLDLC SERPL CALC-MCNC: 19 MG/DL (ref 5–40)
WBC # BLD AUTO: 9.5 X10E3/UL (ref 3.4–10.8)

## 2023-06-05 ENCOUNTER — OFFICE VISIT (OUTPATIENT)
Dept: FAMILY MEDICINE CLINIC | Facility: CLINIC | Age: 33
End: 2023-06-05
Payer: COMMERCIAL

## 2023-06-05 VITALS
BODY MASS INDEX: 45.16 KG/M2 | TEMPERATURE: 97.7 F | OXYGEN SATURATION: 96 % | DIASTOLIC BLOOD PRESSURE: 88 MMHG | SYSTOLIC BLOOD PRESSURE: 136 MMHG | HEART RATE: 96 BPM | WEIGHT: 281 LBS | HEIGHT: 66 IN

## 2023-06-05 DIAGNOSIS — J45.30 MILD PERSISTENT ASTHMA WITHOUT COMPLICATION: ICD-10-CM

## 2023-06-05 DIAGNOSIS — R45.7 EMOTIONAL STRESS: ICD-10-CM

## 2023-06-05 DIAGNOSIS — Z00.00 ROUTINE ADULT HEALTH MAINTENANCE: Primary | ICD-10-CM

## 2023-06-05 PROBLEM — L63.9 ALOPECIA AREATA: Status: ACTIVE | Noted: 2023-06-05

## 2023-06-05 PROCEDURE — 99395 PREV VISIT EST AGE 18-39: CPT | Performed by: FAMILY MEDICINE

## 2023-06-05 RX ORDER — SERTRALINE HYDROCHLORIDE 25 MG/1
25 TABLET, FILM COATED ORAL DAILY
Qty: 90 TABLET | Refills: 1 | Status: SHIPPED | OUTPATIENT
Start: 2023-06-05

## 2023-06-05 RX ORDER — BECLOMETHASONE DIPROPIONATE HFA 40 UG/1
2 AEROSOL, METERED RESPIRATORY (INHALATION) 2 TIMES DAILY
Qty: 10.6 G | Refills: 5 | Status: SHIPPED | OUTPATIENT
Start: 2023-06-05

## 2023-06-05 NOTE — PROGRESS NOTES
"  Chief Complaint   Patient presents with    Annual Exam     Hi Dr. Kidd, I have my annual exam coming up and I noticed that I do not have labs scheduled. For my annual last year you said you didn't need them.      I was diagnosed with Alopecia areata in January by a dermatologist so I was curious if I needed to get labs because of this new diagnosis.     Thanks  Paola Merritt   Paola Neves is a 33 y.o. female and is here for a yearly physical exam. The patient reports problems - re trying embryo transplant .    Do you take any herbs or supplements that were not prescribed by a doctor? no. If so, these will be added to active medication list.    The following portions of the patient's history were reviewed and updated as appropriate: allergies, current medications, past family history, past medical history, past social history, past surgical history, and problem list.    Social and Family and Surgical History reviewed and updated today, see Rooming tab.    Health History, Preventive Measures and Vaccination flow sheets reviewed and updated today.    Patient's current medical chart in Epic; including previous office notes, Mychart messages, recent phone calls, imaging, labs, specialist's evaluation either in notes or in Media tab reviewed today.    Other outside pertinent medical information also reviewed thru Care Everywhere function is also reviewed today.    Review of Systems  Review of Systems  Pertinent items are noted in HPI.    Vitals:    06/05/23 0857   BP: 136/88   Pulse: 96   Temp: 97.7 °F (36.5 °C)   SpO2: 96%   Weight: 127 kg (281 lb)   Height: 167.6 cm (66\")     Body mass index is 45.35 kg/m².      General Appearance:  Alert, cooperative, no distress, appears stated age   Head:  Normocephalic, without obvious abnormality, atraumatic   Eyes:  PERRL, conjunctiva/corneas clear, EOM's intact.   Ears:  Normal TM's and external ear canals, both ears   Nose: Nares normal, septum midline, mucosa " normal, no drainage or sinus tenderness   Throat: Lips, mucosa, and tongue normal; teeth and gums normal   Neck: Supple, symmetrical, trachea midline, no adenopathy;   thyroid: No enlargement/tenderness/nodules; no carotid  bruit   Back:  Symmetric, no curvature, ROM normal, no CVA tenderness   Lungs:  Clear to auscultation bilaterally, respirations unlabored   Chest wall:  No tenderness or deformity   Heart:  Regular rate and rhythm, S1 and S2 normal, no murmur, rub or gallop   Abdomen:  Soft, non-tender, bowel sounds active all four quadrants,   no masses, no organomegaly   Rectal:        Extremities: Extremities normal, atraumatic, no cyanosis or edema   Pulses: 2+ and symmetric all extremities   Skin: Hair regrowth on scalp, left frontal area.     Lymph nodes: Cervical, supraclavicular, and axillary nodes normal   Neurologic: CNII-XII intact. Normal strength, sensation.          Results for orders placed or performed in visit on 05/26/23   Ferritin    Specimen: Blood   Result Value Ref Range    Ferritin 65 15 - 150 ng/mL   Iron and TIBC    Specimen: Blood   Result Value Ref Range    TIBC 345 250 - 450 ug/dL    UIBC 277 131 - 425 ug/dL    Iron 68 27 - 159 ug/dL    Iron Saturation 20 15 - 55 %   Testosterone, Free, Total    Specimen: Blood   Result Value Ref Range    Testosterone, Total 23 8 - 60 ng/dL    Testosterone, Free 3.9 0.0 - 4.2 pg/mL   TSH Rfx On Abnormal To Free T4    Specimen: Blood   Result Value Ref Range    TSH 1.130 0.450 - 4.500 uIU/mL   Lipid Panel    Specimen: Blood   Result Value Ref Range    Total Cholesterol 161 100 - 199 mg/dL    Triglycerides 102 0 - 149 mg/dL    HDL Cholesterol 39 (L) >39 mg/dL    VLDL Cholesterol Guille 19 5 - 40 mg/dL    LDL Chol Calc (NIH) 103 (H) 0 - 99 mg/dL   GERDA With / DsDNA, RNP, Sjogrens A / B, [place]    Specimen: Blood   Result Value Ref Range    GERDA Direct Negative Negative   Comprehensive Metabolic Panel    Specimen: Blood   Result Value Ref Range    Glucose 91 70  - 99 mg/dL    BUN 13 6 - 20 mg/dL    Creatinine 0.80 0.57 - 1.00 mg/dL    EGFR Result 100 >59 mL/min/1.73    BUN/Creatinine Ratio 16 9 - 23    Sodium 141 134 - 144 mmol/L    Potassium 4.6 3.5 - 5.2 mmol/L    Chloride 106 96 - 106 mmol/L    Total CO2 23 20 - 29 mmol/L    Calcium 9.3 8.7 - 10.2 mg/dL    Total Protein 6.9 6.0 - 8.5 g/dL    Albumin 4.4 3.8 - 4.8 g/dL    Globulin 2.5 1.5 - 4.5 g/dL    A/G Ratio 1.8 1.2 - 2.2    Total Bilirubin 0.4 0.0 - 1.2 mg/dL    Alkaline Phosphatase 72 44 - 121 IU/L    AST (SGOT) 22 0 - 40 IU/L    ALT (SGPT) 25 0 - 32 IU/L   CBC (No Diff)    Specimen: Blood   Result Value Ref Range    WBC 9.5 3.4 - 10.8 x10E3/uL    RBC 4.74 3.77 - 5.28 x10E6/uL    Hemoglobin 13.6 11.1 - 15.9 g/dL    Hematocrit 40.7 34.0 - 46.6 %    MCV 86 79 - 97 fL    MCH 28.7 26.6 - 33.0 pg    MCHC 33.4 31.5 - 35.7 g/dL    RDW 13.0 11.7 - 15.4 %    Platelets 252 150 - 450 x10E3/uL     Assessment & Plan   Healthy female exam.  Diagnoses and all orders for this visit:    1. Routine adult health maintenance (Primary)    2. Emotional stress  -     sertraline (Zoloft) 25 MG tablet; Take 1 tablet by mouth Daily.  Dispense: 90 tablet; Refill: 1    3. Mild persistent asthma without complication  -     Beclomethasone Diprop HFA (Qvar RediHaler) 40 MCG/ACT inhaler; Inhale 2 puffs 2 (Two) Times a Day.  Dispense: 10.6 g; Refill: 5      1. Doing well  Would like to restart Zoloft due to stress of the pending embryo transplant  The 50mg dose caused stomach ache, 25 was ok  2. Patient Counseling:  --Nutrition: Stressed importance of moderation in: sodium, caffeine, , saturated fat and cholesterol.  Discussed: caloric balance, sufficient intake of fresh fruits, vegetables, fiber, calcium, iron.  --Exercise: Stressed the importance of regular exercise.   --Substance Abuse: Discussed cessation and or reduction of tobacco, alcohol, or other drug use; driving or other dangerous activities under the influence.    --Dental health:  Discussed importance of regular tooth brushing, flossing, and dental visits.  --Suggested having eyes and vision checked if needed or past due.  --Immunizations reviewed and given if needed.  -  3. Discussed the patient's BMI with her.  The BMI is above average; BMI management plan is completed  4. Follow up in one year    There are no Patient Instructions on file for this visit.    Medications Discontinued During This Encounter   Medication Reason    sertraline (Zoloft) 50 MG tablet Reorder    Beclomethasone Diprop HFA (Qvar RediHaler) 40 MCG/ACT inhaler Reorder        Dr. Luis Armando Kidd MD  [place], [place]  [facility]

## 2023-12-06 DIAGNOSIS — R45.7 EMOTIONAL STRESS: ICD-10-CM

## 2023-12-06 RX ORDER — SERTRALINE HYDROCHLORIDE 25 MG/1
25 TABLET, FILM COATED ORAL DAILY
Qty: 90 TABLET | Refills: 1 | Status: SHIPPED | OUTPATIENT
Start: 2023-12-06

## 2024-01-09 ENCOUNTER — OFFICE VISIT (OUTPATIENT)
Dept: OBSTETRICS AND GYNECOLOGY | Facility: CLINIC | Age: 34
End: 2024-01-09
Payer: COMMERCIAL

## 2024-01-09 VITALS
DIASTOLIC BLOOD PRESSURE: 84 MMHG | BODY MASS INDEX: 44 KG/M2 | SYSTOLIC BLOOD PRESSURE: 126 MMHG | WEIGHT: 273.8 LBS | HEIGHT: 66 IN

## 2024-01-09 DIAGNOSIS — I10 PRIMARY HYPERTENSION: ICD-10-CM

## 2024-01-09 DIAGNOSIS — O09.819 PREGNANCY RESULTING FROM IN VITRO FERTILIZATION, ANTEPARTUM: Primary | ICD-10-CM

## 2024-01-09 DIAGNOSIS — E66.8 OTHER OBESITY AFFECTING PREGNANCY, ANTEPARTUM: ICD-10-CM

## 2024-01-09 DIAGNOSIS — O30.049 DICHORIONIC DIAMNIOTIC TWIN PREGNANCY, ANTEPARTUM: ICD-10-CM

## 2024-01-09 DIAGNOSIS — O99.210 OTHER OBESITY AFFECTING PREGNANCY, ANTEPARTUM: ICD-10-CM

## 2024-01-09 NOTE — PROGRESS NOTES
Confirmation of pregnancy     Chief Complaint   Patient presents with    Possible Pregnancy     IVF- [facility]         Paola Neves is being seen today for evaluation of pregnancy. She has been seen for infertility at [facility].  Transfer of embryo on 10/31/23.  She has tested + pregnancy; US on 23 showed di-di twins. Records scanned into chart.  She is a 33 y.o. . This problem is new to me, the examiner.       HPI     LNMP: FET 10/31/23  Confident with date: Yes  Taking prenatal vitamins: Yes. Needs RX: No  Planned pregnancy: Yes  Prior obstetric issues, potential pregnancy concerns: infertility; SAB x 2 with embryo transfer  Family history of genetic issues (includes FOB): no  Varicella Hx: yes  Flu vaccine: no  COVID 19 vaccine: yes. Booster vaccine: no  History of STDs: no  Current medications: PNV, Zoloft  Last pap smear: 10/2021- NIL  Smoker: No  Drug or alcohol abuse: No  H/O physical, emotional or sexual abuse: no  H/O mental health disorder: anxiety r/t embryo transfer  Prior testing for Cystic Fibrosis Carrier or Sickle Cell Trait- unk  Prepregnancy BMI - Body mass index is 44.19 kg/m².    Past Medical History:   Diagnosis Date    Abnormal Pap smear of cervix     2017 LGSIL/CIERA 1, 2017 normal    Asthma May 2007    Female infertility     Hypertension May 2016       Past Surgical History:   Procedure Laterality Date    FERTILITY SURGERY  2022    IVF    WISDOM TOOTH EXTRACTION           Current Outpatient Medications:     albuterol sulfate  (90 Base) MCG/ACT inhaler, Inhale 2 puffs Every 4 (Four) Hours As Needed for Wheezing., Disp: 18 g, Rfl: 1    Beclomethasone Diprop HFA (Qvar RediHaler) 40 MCG/ACT inhaler, Inhale 2 puffs 2 (Two) Times a Day., Disp: 10.6 g, Rfl: 5    cetirizine (ZyrTEC) 10 MG tablet, Take 1 tablet by mouth Daily., Disp: , Rfl:     Prenatal MV-Min-Fe Fum-FA-DHA (PRENATAL 1 PO), Take 1 capsule by mouth  "Every Morning., Disp: , Rfl:     sertraline (ZOLOFT) 25 MG tablet, TAKE 1 TABLET BY MOUTH DAILY, Disp: 90 tablet, Rfl: 1    Allergies   Allergen Reactions    Penicillins Hives     Broke out in a rash in kindergarden    Sulfa Antibiotics Hives     Hives in college age       Social History     Socioeconomic History    Marital status:     Number of children: 0    Years of education: assoc degress   Tobacco Use    Smoking status: Never    Smokeless tobacco: Never   Vaping Use    Vaping Use: Never used   Substance and Sexual Activity    Alcohol use: Yes     Alcohol/week: 1.0 standard drink of alcohol     Types: 1 Glasses of wine per week     Comment: 1 a month    Drug use: No    Sexual activity: Yes     Partners: Male     Birth control/protection: None       Family History   Problem Relation Age of Onset    Thyroid disease Mother         Graves    Ulcerative colitis Mother     Hypertension Father     Hearing loss Father     Cancer Maternal Grandmother     Asthma Paternal Grandmother     Hearing loss Paternal Grandmother     Heart disease Paternal Grandfather     Diabetes Paternal Grandfather     Cancer Paternal Grandfather     No Known Problems Sister     Ulcerative colitis Brother     Breast cancer Neg Hx     Ovarian cancer Neg Hx     Uterine cancer Neg Hx     Colon cancer Neg Hx     Pulmonary embolism Neg Hx     Deep vein thrombosis Neg Hx        Review of systems     Review of Systems   Gastrointestinal:  Positive for nausea.   Genitourinary:  Positive for menstrual problem. Negative for pelvic pressure and vaginal bleeding.       Objective    /84   Ht 167.6 cm (66\")   Wt 124 kg (273 lb 12.8 oz)   LMP 05/05/2023   BMI 44.19 kg/m²     Physical Exam  Vitals reviewed.   Constitutional:       General: She is awake. She is not in acute distress.     Appearance: She is morbidly obese. She is not ill-appearing.   Eyes:      Conjunctiva/sclera: Conjunctivae normal.   Pulmonary:      Effort: Pulmonary effort " is normal. No respiratory distress.   Musculoskeletal:      Cervical back: Neck supple. No rigidity.   Skin:     General: Skin is warm and dry.      Capillary Refill: Capillary refill takes less than 2 seconds.   Neurological:      Mental Status: She is alert and oriented to person, place, and time.   Psychiatric:         Mood and Affect: Mood and affect normal.         Behavior: Behavior normal.         Assessment/Plan      FET 10/31/23 = 12w4d EGA with an EDC=7/19/2024.  US confirms IUP with +FCA: Yes. IMP:  twin IUP seen (2 GS each containing a fetus with CA). YS are not seen on today's US.  This is a di-di twin. A  membrane and twin peak sign are seen.  OV are not visible. No FF nor masses are seen in the adnexa nor cul de sac. Oriented to practice.     Pregnancy: Disc importance of regular prenatal care. Enc PNV daily. Counseled on providers and on call phone. Disc Tylenol products are ok and encouraged no ibuprofen or ASA in pregnancy.  Disc exercise in pregnancy, diet, expected weight gain, etc. Enc no use of tobacco, vaping, drugs, or alcohol during pregnancy. Rev warn s/s of SAB.     Labs: Pt counseled on genetic screening, Quad screen, AFP, and NIPS.      Body mass index is 44.19 kg/m². Obese women with a pre-pregnancy BMI of 30+ should strive to gain approx 11-20 pounds for the entire pregnancy.      Smoker- non-smoker    6.   COVID19 precautions were reviewed with the patient. Continue to encourage social distancing, wearing a mask, and good hand hygiene.  I wore a mask, protective eye wear, and gloves during this patient encounter.  Patient also wearing a surgical mask and social distancing was observed. Hand hygeine performed before and after seeing the patient. Also encouraged COVID booster vaccine at 6 month interval from last COVID vaccine. She is s/p COVID vaccine; no booster.    7.  Flu vaccine. Encouraged the flu vaccine during pregnancy. Discuss normal physiological changes during  pregnancy increase the susceptibility of the flu virus and increase the risk of severe illness for the pregnant woman. Disc flu can be harmful to the unborn baby as well. Enc the flu vaccine. Disc with patient that getting the flu vaccine is the first and most important step in protecting against the flu. Flu vaccines given during pregnancy help protect both the mother and the baby. Getting the flu vaccine during pregnancy also helps protect the  from flu illness for several months after their birth, when then are too young to get vaccinated. Also disc the importance of good hand hygiene and avoiding people who are sick. The patient declined the flu vaccine.     8.  Anxiety/emotional stress- stable on Zoloft 25 mg daily; tried increase to 50 mg but caused upset stomach    9. Hx of HTN- not on any medication; normotensive today; needs baseline labs drawn at new OB    10. Asthma- uses inhaler        All questions answered.     Counseling was given to patient and spouse  for the following topics: diagnostic results, instructions for management, risk factor reductions, prognosis, patient and family education, impressions, risks and benefits of treatment options, and importance of treatment compliance . Total time of the encounter was 20 minutes and 20 minutes was spent counseling.  This time does not include time spent performing ultrasound.    Encounter Diagnoses   Name Primary?    Pregnancy resulting from in vitro fertilization, antepartum Yes    Other obesity affecting pregnancy, antepartum     Primary hypertension     Dichorionic diamniotic twin pregnancy, antepartum        Diagnoses and all orders for this visit:    Pregnancy resulting from in vitro fertilization, antepartum  -     Cancel: POC Pregnancy, Urine    Other obesity affecting pregnancy, antepartum    Primary hypertension    Dichorionic diamniotic twin pregnancy, antepartum        RTO in 3 weeks for new OB exam/PAP, labs and ultrasound-twins.      Beckie Barros, APRN  1/10/2024  09:57 EST

## 2024-01-10 PROBLEM — O99.210 MATERNAL OBESITY AFFECTING PREGNANCY, ANTEPARTUM: Status: ACTIVE | Noted: 2024-01-10

## 2024-01-10 PROBLEM — Z00.00 ROUTINE ADULT HEALTH MAINTENANCE: Status: RESOLVED | Noted: 2017-02-24 | Resolved: 2024-01-10

## 2024-01-10 PROBLEM — O30.049 DICHORIONIC DIAMNIOTIC TWIN PREGNANCY, ANTEPARTUM: Status: ACTIVE | Noted: 2024-01-10

## 2024-01-30 ENCOUNTER — INITIAL PRENATAL (OUTPATIENT)
Dept: OBSTETRICS AND GYNECOLOGY | Facility: CLINIC | Age: 34
End: 2024-01-30
Payer: COMMERCIAL

## 2024-01-30 VITALS — SYSTOLIC BLOOD PRESSURE: 128 MMHG | BODY MASS INDEX: 43.16 KG/M2 | WEIGHT: 267.4 LBS | DIASTOLIC BLOOD PRESSURE: 88 MMHG

## 2024-01-30 DIAGNOSIS — Z34.00 INITIAL OBSTETRIC VISIT, ANTEPARTUM: Primary | ICD-10-CM

## 2024-01-30 DIAGNOSIS — Z01.419 CERVICAL SMEAR, AS PART OF ROUTINE GYNECOLOGICAL EXAMINATION: ICD-10-CM

## 2024-01-30 DIAGNOSIS — O21.9 NAUSEA AND VOMITING DURING PREGNANCY PRIOR TO 22 WEEKS GESTATION: ICD-10-CM

## 2024-01-30 DIAGNOSIS — O30.049 DICHORIONIC DIAMNIOTIC TWIN PREGNANCY, ANTEPARTUM: ICD-10-CM

## 2024-01-30 DIAGNOSIS — J45.30 MILD PERSISTENT ASTHMA WITHOUT COMPLICATION: ICD-10-CM

## 2024-01-30 DIAGNOSIS — E66.8 OTHER OBESITY AFFECTING PREGNANCY, ANTEPARTUM: ICD-10-CM

## 2024-01-30 DIAGNOSIS — O09.819 PREGNANCY RESULTING FROM IN VITRO FERTILIZATION, ANTEPARTUM: ICD-10-CM

## 2024-01-30 DIAGNOSIS — I10 HYPERTENSION, UNSPECIFIED TYPE: ICD-10-CM

## 2024-01-30 DIAGNOSIS — Z11.51 SCREENING FOR HUMAN PAPILLOMAVIRUS: ICD-10-CM

## 2024-01-30 DIAGNOSIS — Z36.9 ENCOUNTER FOR ANTENATAL SCREENING, UNSPECIFIED: ICD-10-CM

## 2024-01-30 DIAGNOSIS — O99.210 OTHER OBESITY AFFECTING PREGNANCY, ANTEPARTUM: ICD-10-CM

## 2024-01-30 LAB
BILIRUB BLD-MCNC: NEGATIVE MG/DL
CLARITY, POC: CLEAR
COLOR UR: ABNORMAL
GLUCOSE UR STRIP-MCNC: NEGATIVE MG/DL
KETONES UR QL: ABNORMAL
LEUKOCYTE EST, POC: ABNORMAL
NITRITE UR-MCNC: NEGATIVE MG/ML
PH UR: 5 [PH] (ref 5–8)
PROT UR STRIP-MCNC: ABNORMAL MG/DL
RBC # UR STRIP: NEGATIVE /UL
SP GR UR: 1.02 (ref 1–1.03)
UROBILINOGEN UR QL: NORMAL

## 2024-01-30 RX ORDER — ONDANSETRON 4 MG/1
4 TABLET, ORALLY DISINTEGRATING ORAL EVERY 8 HOURS PRN
Qty: 30 TABLET | Refills: 2 | Status: SHIPPED | OUTPATIENT
Start: 2024-01-30

## 2024-01-30 NOTE — PROGRESS NOTES
Initial OB Visit     Chief Complaint   Patient presents with    Initial Prenatal Visit       Paola Neves is being seen today for her first obstetrical visit.  She is a 33 y.o.    15w4d gestation. This problem is new to me: no      OB History          3    Para   0    Term   0       0    AB   2    Living   0         SAB   2    IAB   0    Ectopic   0    Molar        Multiple   0    Live Births              Obstetric Comments   Desires pregnancy  2021- IVF, embryo transfer with SAB, no D&C  2022- embryo transfer with SAB, no D&C               LNMP: 10/31/2023  Confident with date: Yes- embryo transfer  Taking prenatal vitamins: Yes  Planned pregnancy: Yes  Prior obstetric issues, potential pregnancy concerns:  infertility; SAB x 2 with embryo transfer   Family history of genetic issues (includes FOB): no  Prior infections concerning in pregnancy (Rash, fever in last 2 weeks): no  Varicella Hx: yes  Flu vaccine: declined  COVID Vaccine: yes, no booster  History of STDs: no  Current medications: PNV, Zoloft, rescue inhaler, Qvar daily  Last pap smear: 10/2021- NIL  Smoker: No  Drug or alcohol abuse: No  H/O Physical, mental or sexual abuse: no  H/O mental health disorder: anxiety r/t embryo transfer   Prior testing for Cystic Fibrosis Carrier or Sickle Cell Trait- unk  Prepregnancy BMI: Body mass index is 43.16 kg/m².      Past Medical History:   Diagnosis Date    Abnormal Pap smear of cervix     2017 LGSIL/CIERA 1, 2017 normal    Asthma May 2007    Female infertility     Hypertension May 2016       Past Surgical History:   Procedure Laterality Date    FERTILITY SURGERY  2022    IVF    WISDOM TOOTH EXTRACTION           Current Outpatient Medications:     albuterol sulfate  (90 Base) MCG/ACT inhaler, Inhale 2 puffs Every 4 (Four) Hours As Needed for Wheezing., Disp: 18 g, Rfl: 1    Beclomethasone Diprop HFA (Qvar RediHaler) 40 MCG/ACT inhaler, Inhale 2 puffs 2 (Two) Times a  Day., Disp: 10.6 g, Rfl: 5    cetirizine (ZyrTEC) 10 MG tablet, Take 1 tablet by mouth Daily., Disp: , Rfl:     Prenatal MV-Min-Fe Fum-FA-DHA (PRENATAL 1 PO), Take 1 capsule by mouth Every Morning., Disp: , Rfl:     sertraline (ZOLOFT) 25 MG tablet, TAKE 1 TABLET BY MOUTH DAILY, Disp: 90 tablet, Rfl: 1    Allergies   Allergen Reactions    Penicillins Hives     Broke out in a rash in kindergarden    Sulfa Antibiotics Hives     Hives in college age       Social History     Socioeconomic History    Marital status:     Number of children: 0    Years of education: assoc degress   Tobacco Use    Smoking status: Never    Smokeless tobacco: Never   Vaping Use    Vaping Use: Never used   Substance and Sexual Activity    Alcohol use: Not Currently     Alcohol/week: 1.0 standard drink of alcohol     Types: 1 Glasses of wine per week     Comment: 1 a month    Drug use: No    Sexual activity: Yes     Partners: Male     Birth control/protection: None       Family History   Problem Relation Age of Onset    Thyroid disease Mother         Graves    Ulcerative colitis Mother     Hypertension Father     Hearing loss Father     Cancer Maternal Grandmother     Asthma Paternal Grandmother     Hearing loss Paternal Grandmother     Heart disease Paternal Grandfather     Diabetes Paternal Grandfather     Cancer Paternal Grandfather     No Known Problems Sister     Ulcerative colitis Brother     Breast cancer Neg Hx     Ovarian cancer Neg Hx     Uterine cancer Neg Hx     Colon cancer Neg Hx     Pulmonary embolism Neg Hx     Deep vein thrombosis Neg Hx        Review of systems     All other systems reviewed and are negative except for: Constitutional: positive for headache  Gastrointestinal: positive for nausea and vomiting     Objective    /88   Wt 121 kg (267 lb 6.4 oz)   BMI 43.16 kg/m²     General Appearance:    Alert, cooperative, in no acute distress, habitus obese   Head:    Normocephalic, without obvious abnormality,  atraumatic   Neck:   No adenopathy, supple, trachea midline, no thyromegaly   Lungs:     Clear to auscultation,respirations regular, even and     unlabored    Heart:    Regular rhythm and normal rate, normal S1 and S2, no       murmur, no gallop, no rub, no click   Breast Exam:    Deferred   Abdomen:     Normal bowel sounds, no masses, no organomegaly, soft,    non-tender, non-distended, no guarding, no rebound                tenderness   Genitalia:    Vulva - BUS-WNL, NEFG    Vagina - No discharge, No bleeding    Cervix - No Lesions, closed     Uterus - Consistent with 15 weeks    Adnexa - No mass, NT    Pelvimetry - clinically adequate, gynecoid pelvis     Extremities:   Moves all extremities well, no edema, no cyanosis, no        redness   Skin:   No bleeding, bruising or rash   Lymph nodes:   No palpable adenopathy   Neurologic:   Sensation intact, A&O times 3         Assessment/Plan    1) Pregnancy at 15w4d- US IMP: Twin IUP,placenta low lying and anterior twin a, placenta post and fundal twin b  CL = 4.09, no funneling. US findings discussed with pt/spouse. EDC established 7/19/2024 and confirmed by US.     2) OB exam: OB exam completed: Yes. New OB bag provided Yes. Pap collected: Yes. Provided list of safe medications to take while in pregnancy.    3) Labs: OB labs collected: Yes. Counseled on CF/SMA/FX: No.  Counseled on Quad screen/MT21: Yes, she declines both. Counseled on AFP: Yes, she is undecided.    4)  Body mass index is 43.16 kg/m². Obese women with a pre-pregnancy BMI of 30+ should strive to gain approx 11-20 pounds for the entire pregnancy.       5)  Prenatal care: Oriented to the office and prenatal care. Encourage prenatal vitamins. Disc Tylenol products are fine, avoid aspirin and ibuprofen; Zika (travel restrictions/ok to use insect repellant); not to change cat litter; food restrictions; exercise; avoidance of alcohol, tobacco, drugs and saunas/hot tubs.     6) COVID19 precautions were  reviewed with the patient. Continue to encourage social distancing, wearing a mask, and good hand hygiene.  Hand hygeine performed before and after seeing the patient. She is s/p Covid vaccine; no booster.    7) Declined flu vaccine today    8) di-di twins- start baby asa daily; monitor growth q 4 weeks starting at 18wga; NST/BPP weekly @ 36wga    9)  Anxiety/emotional stress- stable on Zoloft 25 mg daily; tried increase to 50 mg but caused upset stomach     10) Hx of HTN- not on any medication; normotensive today; baseline PIH labs drawn today     11) Asthma- uses steroid inhaler daily; has rescure inhaler if needed    12) N/V- started 4 days ago; ate a few crackers yesterday; hasn't been able to keep much of anything down; headache present; 2+ ketones noted on U/A today with protein;  ERX Zofran; if symptoms continue with Zofran use, she will need to be admitted for IVF    13) IVF pregnancy- will need fetal ECHO 22-24 wks    All questions answered.     I spent 30 minutes caring for Paola on this date of service. This time includes time spent by me in the following activities: preparing for the visit, reviewing tests, obtaining and/or reviewing a separately obtained history, performing a medically appropriate examination and/or evaluation, counseling and educating the patient/family/caregiver, ordering medications, tests, or procedures, and documenting information in the medical record.  This time does not include time spent performing ultrasound.    RTO 4 weeks for OBT, US for anatomy, AFP    Beckie Barros, APRN    1/30/2024  10:01 EST

## 2024-01-31 DIAGNOSIS — O09.819 PREGNANCY RESULTING FROM IN VITRO FERTILIZATION, ANTEPARTUM: Primary | ICD-10-CM

## 2024-01-31 DIAGNOSIS — O30.049 DICHORIONIC DIAMNIOTIC TWIN PREGNANCY, ANTEPARTUM: ICD-10-CM

## 2024-01-31 PROBLEM — Z28.39 RUBELLA NON-IMMUNE STATUS, ANTEPARTUM: Status: ACTIVE | Noted: 2024-01-31

## 2024-01-31 PROBLEM — O09.899 RUBELLA NON-IMMUNE STATUS, ANTEPARTUM: Status: ACTIVE | Noted: 2024-01-31

## 2024-01-31 LAB
ABO GROUP BLD: NORMAL
ALBUMIN SERPL-MCNC: 4.4 G/DL (ref 3.9–4.9)
ALBUMIN/GLOB SERPL: 1.5 {RATIO} (ref 1.2–2.2)
ALP SERPL-CCNC: 79 IU/L (ref 44–121)
ALT SERPL-CCNC: 20 IU/L (ref 0–32)
AST SERPL-CCNC: 26 IU/L (ref 0–40)
BASOPHILS # BLD AUTO: 0.1 X10E3/UL (ref 0–0.2)
BASOPHILS NFR BLD AUTO: 1 %
BILIRUB SERPL-MCNC: 0.6 MG/DL (ref 0–1.2)
BLD GP AB SCN SERPL QL: NEGATIVE
BUN SERPL-MCNC: 9 MG/DL (ref 6–20)
BUN/CREAT SERPL: 12 (ref 9–23)
CALCIUM SERPL-MCNC: 9.5 MG/DL (ref 8.7–10.2)
CHLORIDE SERPL-SCNC: 100 MMOL/L (ref 96–106)
CO2 SERPL-SCNC: 17 MMOL/L (ref 20–29)
CREAT SERPL-MCNC: 0.78 MG/DL (ref 0.57–1)
EGFRCR SERPLBLD CKD-EPI 2021: 103 ML/MIN/1.73
EOSINOPHIL # BLD AUTO: 0.1 X10E3/UL (ref 0–0.4)
EOSINOPHIL NFR BLD AUTO: 1 %
ERYTHROCYTE [DISTWIDTH] IN BLOOD BY AUTOMATED COUNT: 12.6 % (ref 11.7–15.4)
GLOBULIN SER CALC-MCNC: 2.9 G/DL (ref 1.5–4.5)
GLUCOSE SERPL-MCNC: 99 MG/DL (ref 70–99)
HBA1C MFR BLD: 5.3 % (ref 4.8–5.6)
HBV SURFACE AG SERPL QL IA: NEGATIVE
HCT VFR BLD AUTO: 44 % (ref 34–46.6)
HCV IGG SERPL QL IA: NON REACTIVE
HGB A MFR BLD ELPH: 97.3 % (ref 96.4–98.8)
HGB A2 MFR BLD ELPH: 2.7 % (ref 1.8–3.2)
HGB BLD-MCNC: 14.4 G/DL (ref 11.1–15.9)
HGB F MFR BLD ELPH: 0 % (ref 0–2)
HGB FRACT BLD-IMP: NORMAL
HGB S MFR BLD ELPH: 0 %
HIV 1+2 AB+HIV1 P24 AG SERPL QL IA: NON REACTIVE
IMM GRANULOCYTES # BLD AUTO: 0 X10E3/UL (ref 0–0.1)
IMM GRANULOCYTES NFR BLD AUTO: 0 %
LYMPHOCYTES # BLD AUTO: 2 X10E3/UL (ref 0.7–3.1)
LYMPHOCYTES NFR BLD AUTO: 15 %
MCH RBC QN AUTO: 29 PG (ref 26.6–33)
MCHC RBC AUTO-ENTMCNC: 32.7 G/DL (ref 31.5–35.7)
MCV RBC AUTO: 89 FL (ref 79–97)
MONOCYTES # BLD AUTO: 0.7 X10E3/UL (ref 0.1–0.9)
MONOCYTES NFR BLD AUTO: 5 %
NEUTROPHILS # BLD AUTO: 10.5 X10E3/UL (ref 1.4–7)
NEUTROPHILS NFR BLD AUTO: 78 %
PLATELET # BLD AUTO: 291 X10E3/UL (ref 150–450)
POTASSIUM SERPL-SCNC: 4.3 MMOL/L (ref 3.5–5.2)
PROT SERPL-MCNC: 7.3 G/DL (ref 6–8.5)
RBC # BLD AUTO: 4.97 X10E6/UL (ref 3.77–5.28)
RH BLD: POSITIVE
RPR SER QL: NON REACTIVE
RUBV IGG SERPL IA-ACNC: <0.9 INDEX
SODIUM SERPL-SCNC: 137 MMOL/L (ref 134–144)
VZV IGG SER IA-ACNC: 1284 INDEX
WBC # BLD AUTO: 13.4 X10E3/UL (ref 3.4–10.8)

## 2024-02-02 LAB
C TRACH RRNA CVX QL NAA+PROBE: NEGATIVE
CYTOLOGIST CVX/VAG CYTO: NORMAL
CYTOLOGY CVX/VAG DOC CYTO: NORMAL
CYTOLOGY CVX/VAG DOC THIN PREP: NORMAL
DX ICD CODE: NORMAL
HIV 1 & 2 AB SER-IMP: NORMAL
HPV GENOTYPE REFLEX: NORMAL
HPV I/H RISK 4 DNA CVX QL PROBE+SIG AMP: NEGATIVE
N GONORRHOEA RRNA CVX QL NAA+PROBE: NEGATIVE
OTHER STN SPEC: NORMAL
STAT OF ADQ CVX/VAG CYTO-IMP: NORMAL
T VAGINALIS RRNA SPEC QL NAA+PROBE: NEGATIVE

## 2024-02-03 LAB
AMPHETAMINES UR QL SCN: NEGATIVE NG/ML
BACTERIA UR CULT: ABNORMAL
BACTERIA UR CULT: ABNORMAL
BARBITURATES UR QL SCN: NEGATIVE NG/ML
BENZODIAZ UR QL SCN: NEGATIVE NG/ML
BUPRENORPHINE UR QL: NEGATIVE NG/ML
BZE UR QL SCN: NEGATIVE NG/ML
CANNABINOIDS UR QL SCN: NEGATIVE NG/ML
CREAT UR-MCNC: 286.7 MG/DL (ref 20–300)
FENTANYL UR-MCNC: NEGATIVE PG/ML
LABORATORY COMMENT REPORT: NORMAL
MEPERIDINE UR QL: NEGATIVE NG/ML
METHADONE UR QL SCN: NEGATIVE NG/ML
OPIATES UR QL SCN: NEGATIVE NG/ML
OXYCODONE+OXYMORPHONE UR QL SCN: NEGATIVE NG/ML
PCP UR QL: NEGATIVE NG/ML
PH UR: 5.9 [PH] (ref 4.5–8.9)
PROPOXYPH UR QL SCN: NEGATIVE NG/ML
TRAMADOL UR QL SCN: NEGATIVE NG/ML

## 2024-02-28 ENCOUNTER — ROUTINE PRENATAL (OUTPATIENT)
Dept: OBSTETRICS AND GYNECOLOGY | Facility: CLINIC | Age: 34
End: 2024-02-28
Payer: COMMERCIAL

## 2024-02-28 VITALS — SYSTOLIC BLOOD PRESSURE: 142 MMHG | BODY MASS INDEX: 45.1 KG/M2 | DIASTOLIC BLOOD PRESSURE: 100 MMHG | WEIGHT: 279.4 LBS

## 2024-02-28 DIAGNOSIS — F41.9 ANXIETY AND DEPRESSION: ICD-10-CM

## 2024-02-28 DIAGNOSIS — O09.819 PREGNANCY RESULTING FROM IN VITRO FERTILIZATION, ANTEPARTUM: ICD-10-CM

## 2024-02-28 DIAGNOSIS — Z36.9 ENCOUNTER FOR ANTENATAL SCREENING, UNSPECIFIED: Primary | ICD-10-CM

## 2024-02-28 DIAGNOSIS — E66.01 CLASS 3 SEVERE OBESITY DUE TO EXCESS CALORIES WITH SERIOUS COMORBIDITY AND BODY MASS INDEX (BMI) OF 45.0 TO 49.9 IN ADULT: ICD-10-CM

## 2024-02-28 DIAGNOSIS — O99.210 OTHER OBESITY AFFECTING PREGNANCY, ANTEPARTUM: ICD-10-CM

## 2024-02-28 DIAGNOSIS — Z28.39 RUBELLA NON-IMMUNE STATUS, ANTEPARTUM: ICD-10-CM

## 2024-02-28 DIAGNOSIS — E66.8 OTHER OBESITY AFFECTING PREGNANCY, ANTEPARTUM: ICD-10-CM

## 2024-02-28 DIAGNOSIS — F32.A ANXIETY AND DEPRESSION: ICD-10-CM

## 2024-02-28 DIAGNOSIS — J45.30 MILD PERSISTENT ASTHMA WITHOUT COMPLICATION: ICD-10-CM

## 2024-02-28 DIAGNOSIS — O30.049 DICHORIONIC DIAMNIOTIC TWIN PREGNANCY, ANTEPARTUM: ICD-10-CM

## 2024-02-28 DIAGNOSIS — I10 HYPERTENSION, UNSPECIFIED TYPE: ICD-10-CM

## 2024-02-28 DIAGNOSIS — O09.899 RUBELLA NON-IMMUNE STATUS, ANTEPARTUM: ICD-10-CM

## 2024-02-28 LAB
BILIRUB BLD-MCNC: NEGATIVE MG/DL
CLARITY, POC: CLEAR
COLOR UR: YELLOW
GLUCOSE UR STRIP-MCNC: NEGATIVE MG/DL
KETONES UR QL: NEGATIVE
LEUKOCYTE EST, POC: NEGATIVE
NITRITE UR-MCNC: NEGATIVE MG/ML
PH UR: 5 [PH] (ref 5–8)
PROT 24H UR-MRATE: 124.2 MG/24HOURS (ref 0–150)
PROT UR STRIP-MCNC: NEGATIVE MG/DL
PROT UR-MCNC: 5.4 MG/DL
RBC # UR STRIP: NEGATIVE /UL
SP GR UR: 1 (ref 1–1.03)
UROBILINOGEN UR QL: NORMAL

## 2024-02-28 PROCEDURE — 0502F SUBSEQUENT PRENATAL CARE: CPT | Performed by: OBSTETRICS & GYNECOLOGY

## 2024-02-28 NOTE — PROGRESS NOTES
OB follow up     Paola Neves is a 34 y.o.  19w5d being seen today for her obstetrical visit.  Patient reports  she feels better. No HA or visual changes.  . Fetal movement: normal.x 2. She is on ASA 8 1mg. She is not interested in AFP/NIPT. This pregnancy is a product of IVF with donated embryos from the same donor)     Review of Systems  No bleeding, No cramping/contractions     /100   Wt 127 kg (279 lb 6.4 oz)   BMI 45.10 kg/m²     FHT: 158 & 160  BPM   Uterine Size: NA  cm       Assessment/Plan:    1) 34 y.o.  -pregnancy at 19w5d- di-di twins.     2) US today shows di- di twins, CL = 4.8 cm. Limited anatomy scan. Fetus A has incomplete heart/face/diaphragm views. Anterior placenta, male. Twin B has limited views of heart, cord insertion, face and diaphragm. Posterior/fundal placenta. Gender unclear. US compared to scan on 2024. Pt has MFM appt on 3/1/2024.     3) Declines AFP/NIPT.     4) IVF- 2 donated embryos- will need fetal echo 22-24 weeks. Declines carrier screening.     5) CHTN- on ASA 81 mg. No meds. Urned in 24 hour urine today. Enc pt to check BP at home and keep a log. Will need repeat BP in 2 weeks.     6) RNI- needs MMR pp . Avoid those with fevers and rashes.     7) Anxiety and depression- doing well on Zoloft 25 mg    8) Asthma- mild. Has inhaler at home for prn use.     9) BMI 45- will need growth US q 4 weeks    10) Reviewed this stage of pregnancy    11)Problem list updated     12) RTO 2 weeks OBT       Tanja Jeter MD    2024  11:55 EST

## 2024-02-29 ENCOUNTER — TRANSCRIBE ORDERS (OUTPATIENT)
Dept: ULTRASOUND IMAGING | Facility: HOSPITAL | Age: 34
End: 2024-02-29
Payer: COMMERCIAL

## 2024-02-29 DIAGNOSIS — O30.049 DICHORIONIC DIAMNIOTIC TWIN PREGNANCY, ANTEPARTUM: Primary | ICD-10-CM

## 2024-02-29 DIAGNOSIS — O09.819 PREGNANCY RESULTING FROM IN VITRO FERTILIZATION, ANTEPARTUM: ICD-10-CM

## 2024-03-01 ENCOUNTER — OFFICE VISIT (OUTPATIENT)
Dept: OBSTETRICS AND GYNECOLOGY | Facility: CLINIC | Age: 34
End: 2024-03-01
Payer: COMMERCIAL

## 2024-03-01 ENCOUNTER — HOSPITAL ENCOUNTER (OUTPATIENT)
Dept: ULTRASOUND IMAGING | Facility: HOSPITAL | Age: 34
Discharge: HOME | End: 2024-03-01
Admitting: OBSTETRICS & GYNECOLOGY
Payer: COMMERCIAL

## 2024-03-01 VITALS
SYSTOLIC BLOOD PRESSURE: 144 MMHG | HEART RATE: 99 BPM | WEIGHT: 280 LBS | HEIGHT: 66 IN | TEMPERATURE: 98.7 F | BODY MASS INDEX: 45 KG/M2 | DIASTOLIC BLOOD PRESSURE: 92 MMHG

## 2024-03-01 DIAGNOSIS — O09.819 PREGNANCY RESULTING FROM IN VITRO FERTILIZATION, ANTEPARTUM: ICD-10-CM

## 2024-03-01 DIAGNOSIS — O30.049 DICHORIONIC DIAMNIOTIC TWIN PREGNANCY, ANTEPARTUM: ICD-10-CM

## 2024-03-01 DIAGNOSIS — O30.049 DICHORIONIC DIAMNIOTIC TWIN PREGNANCY, ANTEPARTUM: Primary | ICD-10-CM

## 2024-03-01 PROCEDURE — 76812 OB US DETAILED ADDL FETUS: CPT

## 2024-03-01 PROCEDURE — 76811 OB US DETAILED SNGL FETUS: CPT

## 2024-03-01 NOTE — LETTER
March 1, 2024     Tanja Jeter MD  [address]  [address]  [place] [address]    Patient: Paola Neves   YOB: 1990   Date of Visit: 3/1/2024       Dear Tanja Jeter MD,    Thank you for referring Paola Neves to me for evaluation. Below is a copy of my consult note.    If you have questions, please do not hesitate to call me. I look forward to following Paola along with you.         Sincerely,        GANGA Nj    MATERNAL FETAL MEDICINE Consult Note    Dear Dr Tanja Jeter*:    Thank you for your kind referral of Paola Neves.  As you know, she is a 34 y.o. G 3 P 0020 at 20  0/7 weeks gestation (Estimated Date of Delivery: 7/19/24). This is a consult.     Her antepartum course is complicated by:  IVF  Di/Di Twins    Aneuploidy Screening: not yet    HPI: Today, she denies headache, blurry vision, RUQ pain. No vaginal bleeding, no contractions.     Review of History:  Past Medical History:   Diagnosis Date   • Abnormal Pap smear of cervix     6/2017 LGSIL/CIERA 1, 11/2017 normal   • Asthma May 2007   • Female infertility    • Hypertension May 2016   • Ovarian cyst      Past Surgical History:   Procedure Laterality Date   • FERTILITY SURGERY  05/02/2022    IVF   • WISDOM TOOTH EXTRACTION  04/2017     Social History     Socioeconomic History   • Marital status:    • Number of children: 0   • Years of education: assoc degress   Tobacco Use   • Smoking status: Never   • Smokeless tobacco: Never   Vaping Use   • Vaping Use: Never used   Substance and Sexual Activity   • Alcohol use: Not Currently     Alcohol/week: 1.0 standard drink of alcohol     Types: 1 Glasses of wine per week     Comment: 1 a month   • Drug use: No   • Sexual activity: Yes     Partners: Male     Birth control/protection: None     Family History   Problem Relation Age of Onset   • Thyroid disease Mother         Graves   • Ulcerative colitis Mother    • Hypertension  Father    • Hearing loss Father    • Cancer Maternal Grandmother    • Asthma Paternal Grandmother    • Hearing loss Paternal Grandmother    • Heart disease Paternal Grandfather    • Diabetes Paternal Grandfather    • Cancer Paternal Grandfather    • No Known Problems Sister    • Ulcerative colitis Brother    • Breast cancer Neg Hx    • Ovarian cancer Neg Hx    • Uterine cancer Neg Hx    • Colon cancer Neg Hx    • Pulmonary embolism Neg Hx    • Deep vein thrombosis Neg Hx       Allergies   Allergen Reactions   • Penicillins Hives     Broke out in a rash in kindergarden   • Sulfa Antibiotics Hives     Hives in college age      Current Outpatient Medications on File Prior to Visit   Medication Sig Dispense Refill   • albuterol sulfate  (90 Base) MCG/ACT inhaler Inhale 2 puffs Every 4 (Four) Hours As Needed for Wheezing. 18 g 1   • Beclomethasone Diprop HFA (Qvar RediHaler) 40 MCG/ACT inhaler Inhale 2 puffs 2 (Two) Times a Day. 10.6 g 5   • cetirizine (ZyrTEC) 10 MG tablet Take 1 tablet by mouth Daily.     • ondansetron ODT (ZOFRAN-ODT) 4 MG disintegrating tablet Place 1 tablet on the tongue Every 8 (Eight) Hours As Needed for Nausea or Vomiting. 30 tablet 2   • Prenatal MV-Min-Fe Fum-FA-DHA (PRENATAL 1 PO) Take 1 capsule by mouth Every Morning.     • sertraline (ZOLOFT) 25 MG tablet TAKE 1 TABLET BY MOUTH DAILY 90 tablet 1     No current facility-administered medications on file prior to visit.        Past obstetric, gynecological, medical, surgical, family and social history reviewed.  Relevant lab work and imaging reviewed.    Review of systems  Constitutional:  denies fever, chills, malaise.   ENT/Mouth:  denies sore throat, tinnitus  Eyes: denies vision changes/pain  CV:  denies chest pain  Respiratory:  denies cough/SOB  GI:  denies N/V, diarrhea, abdominal pain.    :   denies dysuria  Skin:  denies lesions or pruritus   Neuro:  denies weakness, focal neurologic symptoms    Vitals:    03/01/24 1052  "24 1054 24 1057   BP: 159/93 153/87 144/90   BP Location: Right arm Left arm Left arm   Patient Position: Sitting Sitting Sitting   Pulse: 99     Temp: 98.7 °F (37.1 °C)     TempSrc: Temporal     Weight: 127 kg (280 lb)     Height: 167.6 cm (66\")         PHYSICAL EXAM   GENERAL: Not in acute distress, AAOx3, pleasant  CARDIO: regular rate and rhythm  PULM: symmetric chest rise, speaking in complete sentences without difficulty  NEURO: awake, alert and oriented to person, place, and time  ABDOMINAL: No fundal tenderness, no rebound or guarding, gravid  EXTREMITIES: no bilateral lower extremity edema/tenderness  SKIN: Warm, well-perfused      ULTRASOUND   Please view full ultrasound note on Imaging tab in ViewPoint.  Dichorionic/diamniotic twin gestation.  A: Cephalic presentation.  Anterior placenta.  MVP 6 cm, which is normal.    g, AC 53%,  Normal appearing anatomy except suboptimally viewed spine and face.     B: Transverse presentation.  Posterior placenta.  MVP 5 cm, which is normal.    g, AC 13%  Normal appearing anatomy except several suboptimal views.   Normal UA dopplers without absent or reversed end diastolic flow.    Discordance 24.5%, which is increased.    CL transabdominally >3.5 cm, which is normal.      ASSESSMENT/COUNSELIN y.o. G 3 P 0020 at 20  0/7 weeks gestation (Estimated Date of Delivery: 24)    -Pregnancy  [ X ] stable  [   ] improving [  ] worsening    Diagnoses and all orders for this visit:    1. Dichorionic diamniotic twin pregnancy, antepartum (Primary)    2. Pregnancy resulting from in vitro fertilization, antepartum       IVF Pregnancy  This patient's pregnancy was conceived via IVF.  We discussed that the risk of birth defects in pregnancies conceived through IVF or ICSI is slightly higher than that for naturally conceived pregnancies. The risk for birth defects for IVF and ICSI pregnancies has ranged from 5-8%.  There are few studies that " investigate whether paternal age is a factor for IVF and ICSI but it is thought not to be a major contributor to birth defect risk.  The most common birth defects in pregnanices conceived with IVF-ICSI are cardiac malformations and genitourinary malformations.  The risk of birth defects from autologous and donor oocytes does not differ.    There appears to be a small increased risk of uteroplacental insufficiency (FGR,  Pre-eclampsia) and abnormal placentation (Accreta spectrum) in IVF pregnancies.  The risk of gestational diabetes is also increased slightly.     Thus, a detailed anatomy as well as a fetal ECHO is indicated to further evaluate for cardiac malformation.  Serial growth exams are indicated.        Dichorionic twin gestation  Today's ultrasound is consistent with a dichorionic diamniotic twin gestation consistent with dating. There was a thick intervening membrane consistent with dichorionic gestation. I discussed with the patient today the increased risks of twin pregnancies in general. We discussed the risk of aneuploidy, structural anomalies, intrauterine growth restriction, growth discordance,  labor and its associated  morbidities, preeclampsia, diabetes, indicated  birth, and  delivery for malpresentation, and stillbirth. I recommended daily baby aspirin for preeclampsia risk reduction x 2.     I recommend serial ultrasounds for growth every 4 weeks after that. If the growth remains concordant, then no additional testing would be indicated. For di/di twins, planned delivery at 38 weeks is recommended as this is the lupillo of morbidity in these twins.   Decisions regarding mode of delivery should factor in obstetric history (such as prior ) and presentation of both twins at time of delivery.  Twin gestation alone does not necessitate a  delivery in otherwise uncomplicated vertex vertex twin gestations.    Borderline, baby B  We discussed the potential  underlying etiologies of  fetal growth restriction including but not limited to fetal chromosome disorder, syndromes, congenital infection, constitutional, and placental insufficiency.  I did  her that I can never rule out genetic causes by ultrasound, and that I recommend considering cell free DNA testing.   Cell free DNA does not check for all genetic issues.  With congenital infections, we often see profound CNS findings such as ventriculomegaly, echogenic bowel, and even hydrops.  We do not see any of this today.       I think the most likely cause of this is placental insufficiency if FGR is real.   Today we have normal umbilical artery dopplers which is reassuring.  We discussed umbilical artery doppler studies as 4 possible levels: normal, elevated, absent, and reversed.  We discussed that absent and reversed would require admission,  corticosteroids, and possibly delivery depending on gestational age and other testing.  She is currently previable and this is bordeline with normal dopplers, so we will bring her back in 3 weeks and further assess.      TN  No meds  We discussed the risks of chronic hypertension including intrauterine growth restriction, increased risk of preeclampsia, increased risk of premature delivery, and increased risk for placental abruption.  I reassured her that with mild hypertension, no underlying cardiac disease, and normal renal function, most women do well in pregnancy.    I explained that acceptable blood pressures in pregnancies with chronic hypertension and without renal damage are 120-140/70-90s. Newer data from the [facility] (2022, CHAP TRIAL), supports more aggressive bp treatment to below 140/90 (previously ,160 in CHTN) and that this does not impair fetal growth or well-being but reduces risks of pre-eclampsia,  birth, and other pregnancy morbidity.  I discussed this with the patient.  She will take her BP over the weekend and send in log Tuesday.   If she has elevations, we will likely start medication given her borderline FGR.      I recommended serial growth ultrasounds every 4 weeks  to ensure appropriate fetal growth. In addition,  fetal testing 1-2x weekly should be initiated around 32 weeks gestation if not indicated sooner.     Summary of Plan  -Repeat Growth in 3 weeks  -Recommend panorama--pt will consider  -Careful attention to fetal movement and symptoms of pre-eclampsia--needs labs at next OB visit  -Recommend 2 baby ASA  -If FGR found in 3 weeks will discuss surveillance  -Fetal ECHO ordered  -Sending BP log Tuesday to decide about meds     Follow-up: 3 weeks    Thank you for the consult and opportunity to care for this patient.  Please feel free to reach out with any questions or concerns.      I spent 26 minutes caring for this patient on this date of service. This time includes time spent by me in the following activities: preparing for the visit, reviewing tests, obtaining and/or reviewing a separately obtained history, performing a medically appropriate examination and/or evaluation, counseling and educating the patient/family/caregiver and independently interpreting results and communicating that information with the patient/family/caregiver with greater than 50% spent in counseling and coordination of care.     4 minutes reading US.     Tamika Humphreys MD FACOG  Maternal Fetal Medicine-[facility]  Office: [phone]  [email or web address]

## 2024-03-01 NOTE — PROGRESS NOTES
MATERNAL FETAL MEDICINE Consult Note    Dear Dr Tanja Jeter*:    Thank you for your kind referral of Paola Neves.  As you know, she is a 34 y.o. G 3 P 0020 at 20  0/7 weeks gestation (Estimated Date of Delivery: 7/19/24). This is a consult.     Her antepartum course is complicated by:  IVF  Di/Di Twins    Aneuploidy Screening: not yet    HPI: Today, she denies headache, blurry vision, RUQ pain. No vaginal bleeding, no contractions.     Review of History:  Past Medical History:   Diagnosis Date    Abnormal Pap smear of cervix     6/2017 LGSIL/CIERA 1, 11/2017 normal    Asthma May 2007    Female infertility     Hypertension May 2016    Ovarian cyst      Past Surgical History:   Procedure Laterality Date    FERTILITY SURGERY  05/02/2022    IVF    WISDOM TOOTH EXTRACTION  04/2017     Social History     Socioeconomic History    Marital status:     Number of children: 0    Years of education: assoc degress   Tobacco Use    Smoking status: Never    Smokeless tobacco: Never   Vaping Use    Vaping Use: Never used   Substance and Sexual Activity    Alcohol use: Not Currently     Alcohol/week: 1.0 standard drink of alcohol     Types: 1 Glasses of wine per week     Comment: 1 a month    Drug use: No    Sexual activity: Yes     Partners: Male     Birth control/protection: None     Family History   Problem Relation Age of Onset    Thyroid disease Mother         Graves    Ulcerative colitis Mother     Hypertension Father     Hearing loss Father     Cancer Maternal Grandmother     Asthma Paternal Grandmother     Hearing loss Paternal Grandmother     Heart disease Paternal Grandfather     Diabetes Paternal Grandfather     Cancer Paternal Grandfather     No Known Problems Sister     Ulcerative colitis Brother     Breast cancer Neg Hx     Ovarian cancer Neg Hx     Uterine cancer Neg Hx     Colon cancer Neg Hx     Pulmonary embolism Neg Hx     Deep vein thrombosis Neg Hx       Allergies   Allergen Reactions     "Penicillins Hives     Broke out in a rash in kindergarden    Sulfa Antibiotics Hives     Hives in college age      Current Outpatient Medications on File Prior to Visit   Medication Sig Dispense Refill    albuterol sulfate  (90 Base) MCG/ACT inhaler Inhale 2 puffs Every 4 (Four) Hours As Needed for Wheezing. 18 g 1    Beclomethasone Diprop HFA (Qvar RediHaler) 40 MCG/ACT inhaler Inhale 2 puffs 2 (Two) Times a Day. 10.6 g 5    cetirizine (ZyrTEC) 10 MG tablet Take 1 tablet by mouth Daily.      ondansetron ODT (ZOFRAN-ODT) 4 MG disintegrating tablet Place 1 tablet on the tongue Every 8 (Eight) Hours As Needed for Nausea or Vomiting. 30 tablet 2    Prenatal MV-Min-Fe Fum-FA-DHA (PRENATAL 1 PO) Take 1 capsule by mouth Every Morning.      sertraline (ZOLOFT) 25 MG tablet TAKE 1 TABLET BY MOUTH DAILY 90 tablet 1     No current facility-administered medications on file prior to visit.        Past obstetric, gynecological, medical, surgical, family and social history reviewed.  Relevant lab work and imaging reviewed.    Review of systems  Constitutional:  denies fever, chills, malaise.   ENT/Mouth:  denies sore throat, tinnitus  Eyes: denies vision changes/pain  CV:  denies chest pain  Respiratory:  denies cough/SOB  GI:  denies N/V, diarrhea, abdominal pain.    :   denies dysuria  Skin:  denies lesions or pruritus   Neuro:  denies weakness, focal neurologic symptoms    Vitals:    03/01/24 1052 03/01/24 1054 03/01/24 1057   BP: 159/93 153/87 144/90   BP Location: Right arm Left arm Left arm   Patient Position: Sitting Sitting Sitting   Pulse: 99     Temp: 98.7 °F (37.1 °C)     TempSrc: Temporal     Weight: 127 kg (280 lb)     Height: 167.6 cm (66\")         PHYSICAL EXAM   GENERAL: Not in acute distress, AAOx3, pleasant  CARDIO: regular rate and rhythm  PULM: symmetric chest rise, speaking in complete sentences without difficulty  NEURO: awake, alert and oriented to person, place, and time  ABDOMINAL: No fundal " tenderness, no rebound or guarding, gravid  EXTREMITIES: no bilateral lower extremity edema/tenderness  SKIN: Warm, well-perfused      ULTRASOUND   Please view full ultrasound note on Imaging tab in ViewPoint.  Dichorionic/diamniotic twin gestation.  A: Cephalic presentation.  Anterior placenta.  MVP 6 cm, which is normal.    g, AC 53%,  Normal appearing anatomy except suboptimally viewed spine and face.     B: Transverse presentation.  Posterior placenta.  MVP 5 cm, which is normal.    g, AC 13%  Normal appearing anatomy except several suboptimal views.   Normal UA dopplers without absent or reversed end diastolic flow.    Discordance 24.5%, which is increased.    CL transabdominally >3.5 cm, which is normal.      ASSESSMENT/COUNSELIN y.o. G 3 P 0020 at 20  0/7 weeks gestation (Estimated Date of Delivery: 24)    -Pregnancy  [ X ] stable  [   ] improving [  ] worsening    Diagnoses and all orders for this visit:    1. Dichorionic diamniotic twin pregnancy, antepartum (Primary)    2. Pregnancy resulting from in vitro fertilization, antepartum       IVF Pregnancy  This patient's pregnancy was conceived via IVF.  We discussed that the risk of birth defects in pregnancies conceived through IVF or ICSI is slightly higher than that for naturally conceived pregnancies. The risk for birth defects for IVF and ICSI pregnancies has ranged from 5-8%.  There are few studies that investigate whether paternal age is a factor for IVF and ICSI but it is thought not to be a major contributor to birth defect risk.  The most common birth defects in pregnanices conceived with IVF-ICSI are cardiac malformations and genitourinary malformations.  The risk of birth defects from autologous and donor oocytes does not differ.    There appears to be a small increased risk of uteroplacental insufficiency (FGR,  Pre-eclampsia) and abnormal placentation (Accreta spectrum) in IVF pregnancies.  The risk of gestational  diabetes is also increased slightly.     Thus, a detailed anatomy as well as a fetal ECHO is indicated to further evaluate for cardiac malformation.  Serial growth exams are indicated.        Dichorionic twin gestation  Today's ultrasound is consistent with a dichorionic diamniotic twin gestation consistent with dating. There was a thick intervening membrane consistent with dichorionic gestation. I discussed with the patient today the increased risks of twin pregnancies in general. We discussed the risk of aneuploidy, structural anomalies, intrauterine growth restriction, growth discordance,  labor and its associated  morbidities, preeclampsia, diabetes, indicated  birth, and  delivery for malpresentation, and stillbirth. I recommended daily baby aspirin for preeclampsia risk reduction x 2.     I recommend serial ultrasounds for growth every 4 weeks after that. If the growth remains concordant, then no additional testing would be indicated. For di/di twins, planned delivery at 38 weeks is recommended as this is the lupillo of morbidity in these twins.   Decisions regarding mode of delivery should factor in obstetric history (such as prior ) and presentation of both twins at time of delivery.  Twin gestation alone does not necessitate a  delivery in otherwise uncomplicated vertex vertex twin gestations.    Borderline, baby B  We discussed the potential underlying etiologies of  fetal growth restriction including but not limited to fetal chromosome disorder, syndromes, congenital infection, constitutional, and placental insufficiency.  I did  her that I can never rule out genetic causes by ultrasound, and that I recommend considering cell free DNA testing.   Cell free DNA does not check for all genetic issues.  With congenital infections, we often see profound CNS findings such as ventriculomegaly, echogenic bowel, and even hydrops.  We do not see any of this today.        I think the most likely cause of this is placental insufficiency if FGR is real.   Today we have normal umbilical artery dopplers which is reassuring.  We discussed umbilical artery doppler studies as 4 possible levels: normal, elevated, absent, and reversed.  We discussed that absent and reversed would require admission,  corticosteroids, and possibly delivery depending on gestational age and other testing.  She is currently previable and this is bordeline with normal dopplers, so we will bring her back in 3 weeks and further assess.      CHTN  No meds  We discussed the risks of chronic hypertension including intrauterine growth restriction, increased risk of preeclampsia, increased risk of premature delivery, and increased risk for placental abruption.  I reassured her that with mild hypertension, no underlying cardiac disease, and normal renal function, most women do well in pregnancy.    I explained that acceptable blood pressures in pregnancies with chronic hypertension and without renal damage are 120-140/70-90s. Newer data from the [facility] (2022, CHAP TRIAL), supports more aggressive bp treatment to below 140/90 (previously ,160 in CHTN) and that this does not impair fetal growth or well-being but reduces risks of pre-eclampsia,  birth, and other pregnancy morbidity.  I discussed this with the patient.  She will take her BP over the weekend and send in log Tuesday.  If she has elevations, we will likely start medication given her borderline FGR.      I recommended serial growth ultrasounds every 4 weeks  to ensure appropriate fetal growth. In addition,  fetal testing 1-2x weekly should be initiated around 32 weeks gestation if not indicated sooner.     Summary of Plan  -Repeat Growth in 3 weeks  -Recommend panorama--pt will consider  -Careful attention to fetal movement and symptoms of pre-eclampsia--needs labs at next OB visit  -Recommend 2 baby ASA  -If FGR found in 3 weeks  will discuss surveillance  -Fetal ECHO ordered  -Sending BP log Tuesday to decide about meds     Follow-up: 3 weeks    Thank you for the consult and opportunity to care for this patient.  Please feel free to reach out with any questions or concerns.      I spent 26 minutes caring for this patient on this date of service. This time includes time spent by me in the following activities: preparing for the visit, reviewing tests, obtaining and/or reviewing a separately obtained history, performing a medically appropriate examination and/or evaluation, counseling and educating the patient/family/caregiver and independently interpreting results and communicating that information with the patient/family/caregiver with greater than 50% spent in counseling and coordination of care.     4 minutes reading US.     Tamika Humphreys MD FACOG  Maternal Fetal Medicine-[facility]  Office: [phone]  [email or web address]

## 2024-03-01 NOTE — PROGRESS NOTES
Pt reports that she is doing well and denies vaginal bleeding, cramping, contractions or LOF at this time. Reports active fetal movement x2. Reviewed when to call OB office or present to L&D for evaluation with symptoms such as decreased fetal movement, vaginal bleeding, LOF or ctxs. Pt verbalized understanding. Denies HA, visual changes or epigastric pain. Denies any additional complaints at time of appointment. Next OB appointment scheduled for 3/14.    Vitals:    03/01/24 1057   BP: 144/90   Pulse:    Temp:

## 2024-03-04 ENCOUNTER — PATIENT ROUNDING (BHMG ONLY) (OUTPATIENT)
Dept: OBSTETRICS AND GYNECOLOGY | Facility: CLINIC | Age: 34
End: 2024-03-04
Payer: COMMERCIAL

## 2024-03-05 ENCOUNTER — DOCUMENTATION (OUTPATIENT)
Dept: OBSTETRICS AND GYNECOLOGY | Facility: CLINIC | Age: 34
End: 2024-03-05
Payer: COMMERCIAL

## 2024-03-05 PROBLEM — N92.6 ABNORMAL MENSTRUAL CYCLE: Status: RESOLVED | Noted: 2022-10-05 | Resolved: 2024-03-05

## 2024-03-05 PROBLEM — F41.9 ANXIETY AND DEPRESSION: Status: ACTIVE | Noted: 2024-03-05

## 2024-03-05 PROBLEM — R87.612 LGSIL ON PAP SMEAR OF CERVIX: Status: RESOLVED | Noted: 2017-05-31 | Resolved: 2024-03-05

## 2024-03-05 PROBLEM — R45.7 EMOTIONAL STRESS: Status: RESOLVED | Noted: 2021-03-12 | Resolved: 2024-03-05

## 2024-03-05 PROBLEM — F32.A ANXIETY AND DEPRESSION: Status: ACTIVE | Noted: 2024-03-05

## 2024-03-05 NOTE — PROGRESS NOTES
Weekly MFM blood pressure log evaluation. No medications needed at this time per Dr. Humphreys. Encouraged patient to continue to document blood pressures and send them in weekly via [employer] for MFM evaluation.

## 2024-03-12 ENCOUNTER — DOCUMENTATION (OUTPATIENT)
Dept: OBSTETRICS AND GYNECOLOGY | Facility: CLINIC | Age: 34
End: 2024-03-12
Payer: COMMERCIAL

## 2024-03-12 NOTE — PROGRESS NOTES
Weekly blood pressure log eval:    Pt has CHTN. No medications recommended at this time. Pt encouraged to continue monitoring blood pressure.   Next eval: 3/19.

## 2024-03-14 ENCOUNTER — ROUTINE PRENATAL (OUTPATIENT)
Dept: OBSTETRICS AND GYNECOLOGY | Facility: CLINIC | Age: 34
End: 2024-03-14
Payer: COMMERCIAL

## 2024-03-14 VITALS — SYSTOLIC BLOOD PRESSURE: 132 MMHG | WEIGHT: 281.2 LBS | BODY MASS INDEX: 45.39 KG/M2 | DIASTOLIC BLOOD PRESSURE: 88 MMHG

## 2024-03-14 DIAGNOSIS — Z28.39 RUBELLA NON-IMMUNE STATUS, ANTEPARTUM: ICD-10-CM

## 2024-03-14 DIAGNOSIS — O09.819 PREGNANCY RESULTING FROM IN VITRO FERTILIZATION, ANTEPARTUM: ICD-10-CM

## 2024-03-14 DIAGNOSIS — Z36.9 ENCOUNTER FOR ANTENATAL SCREENING, UNSPECIFIED: ICD-10-CM

## 2024-03-14 DIAGNOSIS — O09.899 RUBELLA NON-IMMUNE STATUS, ANTEPARTUM: ICD-10-CM

## 2024-03-14 DIAGNOSIS — O30.049 DICHORIONIC DIAMNIOTIC TWIN PREGNANCY, ANTEPARTUM: Primary | ICD-10-CM

## 2024-03-14 NOTE — PROGRESS NOTES
OB follow up     Paola Neves is a 34 y.o.  21w6d being seen today for her obstetrical visit.  Patient reports no bleeding, no contractions, and no leaking. Fetal movement: normal.  Pregnancy complicated by dichorionic diamniotic twins.  Sees MFM.  Last scan was 3/1/2024.  Mild growth discordance but normal cervical length.  Has follow-up with them soon.    Review of Systems  No bleeding, No cramping/contractions     /88   Wt 128 kg (281 lb 3.2 oz)   BMI 45.39 kg/m²     FHT: present BPM   Uterine Size:         Assessment/Plan:    1) 34 y.o.  -pregnancy at 21w6d    2)   Encounter Diagnoses   Name Primary?    Dichorionic diamniotic twin pregnancy, antepartum Yes    Encounter for  screening, unspecified     Rubella non-immune status, antepartum     Pregnancy resulting from in vitro fertilization, antepartum    2-hour GTT at 28 weeks.  Keep appointment with MFM.    3) Reviewed this stage of pregnancy  4) Problem list updated     Return in about 4 weeks (around 2024) for OB Reginomy.    I spent 10 minutes caring for Paola on this date of service. This time includes time spent by me in the following activities: preparing for the visit, reviewing tests, obtaining and/or reviewing a separately obtained history, performing a medically appropriate examination and/or evaluation, counseling and educating the patient/family/caregiver, and documenting information in the medical record.      Kings Kaminski MD    3/14/2024  13:29 EDT

## 2024-03-26 ENCOUNTER — TRANSCRIBE ORDERS (OUTPATIENT)
Dept: ULTRASOUND IMAGING | Facility: HOSPITAL | Age: 34
End: 2024-03-26
Payer: COMMERCIAL

## 2024-03-26 DIAGNOSIS — O09.819 PREGNANCY RESULTING FROM IN VITRO FERTILIZATION, ANTEPARTUM: ICD-10-CM

## 2024-03-26 DIAGNOSIS — O30.049 DICHORIONIC DIAMNIOTIC TWIN PREGNANCY, ANTEPARTUM: Primary | ICD-10-CM

## 2024-03-27 ENCOUNTER — HOSPITAL ENCOUNTER (OUTPATIENT)
Dept: ULTRASOUND IMAGING | Facility: HOSPITAL | Age: 34
Discharge: HOME | End: 2024-03-27
Admitting: OBSTETRICS & GYNECOLOGY
Payer: COMMERCIAL

## 2024-03-27 ENCOUNTER — OFFICE VISIT (OUTPATIENT)
Dept: OBSTETRICS AND GYNECOLOGY | Facility: CLINIC | Age: 34
End: 2024-03-27
Payer: COMMERCIAL

## 2024-03-27 ENCOUNTER — TRANSCRIBE ORDERS (OUTPATIENT)
Dept: ULTRASOUND IMAGING | Facility: HOSPITAL | Age: 34
End: 2024-03-27
Payer: COMMERCIAL

## 2024-03-27 VITALS
TEMPERATURE: 98 F | SYSTOLIC BLOOD PRESSURE: 142 MMHG | HEIGHT: 66 IN | DIASTOLIC BLOOD PRESSURE: 88 MMHG | WEIGHT: 286.4 LBS | BODY MASS INDEX: 46.03 KG/M2 | HEART RATE: 85 BPM

## 2024-03-27 DIAGNOSIS — O09.819 PREGNANCY RESULTING FROM IN VITRO FERTILIZATION, ANTEPARTUM: ICD-10-CM

## 2024-03-27 DIAGNOSIS — O30.049 DICHORIONIC DIAMNIOTIC TWIN PREGNANCY, ANTEPARTUM: Primary | ICD-10-CM

## 2024-03-27 DIAGNOSIS — E66.8 OTHER OBESITY AFFECTING PREGNANCY, ANTEPARTUM: ICD-10-CM

## 2024-03-27 DIAGNOSIS — O30.049 DICHORIONIC DIAMNIOTIC TWIN PREGNANCY, ANTEPARTUM: ICD-10-CM

## 2024-03-27 DIAGNOSIS — I10 HYPERTENSION, UNSPECIFIED TYPE: ICD-10-CM

## 2024-03-27 DIAGNOSIS — J45.30 MILD PERSISTENT ASTHMA WITHOUT COMPLICATION: ICD-10-CM

## 2024-03-27 DIAGNOSIS — O99.210 OTHER OBESITY AFFECTING PREGNANCY, ANTEPARTUM: ICD-10-CM

## 2024-03-27 PROCEDURE — 76816 OB US FOLLOW-UP PER FETUS: CPT

## 2024-03-27 PROCEDURE — 76820 UMBILICAL ARTERY ECHO: CPT

## 2024-03-27 RX ORDER — NIFEDIPINE 30 MG/1
30 TABLET, EXTENDED RELEASE ORAL DAILY
Qty: 30 TABLET | Refills: 7 | Status: SHIPPED | OUTPATIENT
Start: 2024-03-27

## 2024-03-27 NOTE — PROGRESS NOTES
Pt reports that she is doing well and denies vaginal bleeding, cramping, contractions or LOF at this time. Reports active fetal movement. Reviewed when to call OB office or present to L&D for evaluation with symptoms such as decreased fetal movement, vaginal bleeding, LOF or ctxs. Pt verbalized understanding. Denies HA, visual changes or epigastric pain. Denies any additional complaints at time of appointment. Next OB appointment scheduled for 4/11.    Vitals:    03/27/24 1021   BP: 142/88   Pulse:    Temp:

## 2024-03-27 NOTE — PROGRESS NOTES
MATERNAL FETAL MEDICINE Consult Note    Dear Dr Tanja Jeter*:    Thank you for your kind referral of Paola Neves.  As you know, she is a 34 y.o. G 3 P 0020 at 23  5/7 weeks gestation (Estimated Date of Delivery: 7/19/24). This is a consult.     Her antepartum course is complicated by:  IVF  Di/Di Twins    Aneuploidy Screening: not yet    HPI: Today, she denies headache, blurry vision, RUQ pain. No vaginal bleeding, no contractions.     Review of History:  Past Medical History:   Diagnosis Date    Abnormal Pap smear of cervix     6/2017 LGSIL/CIERA 1, 11/2017 normal    Anxiety and depression 3/5/2024    Asthma May 2007    Female infertility     Hypertension May 2016    Ovarian cyst      Past Surgical History:   Procedure Laterality Date    FERTILITY SURGERY  05/02/2022    IVF    WISDOM TOOTH EXTRACTION  04/2017     Social History     Socioeconomic History    Marital status:     Number of children: 0    Years of education: assoc degress   Tobacco Use    Smoking status: Never    Smokeless tobacco: Never   Vaping Use    Vaping status: Never Used   Substance and Sexual Activity    Alcohol use: Not Currently     Alcohol/week: 1.0 standard drink of alcohol     Types: 1 Glasses of wine per week     Comment: 1 a month    Drug use: No    Sexual activity: Yes     Partners: Male     Birth control/protection: None     Family History   Problem Relation Age of Onset    Thyroid disease Mother         Graves    Ulcerative colitis Mother     Hypertension Father     Hearing loss Father     Cancer Maternal Grandmother     Asthma Paternal Grandmother     Hearing loss Paternal Grandmother     Heart disease Paternal Grandfather     Diabetes Paternal Grandfather     Cancer Paternal Grandfather     No Known Problems Sister     Ulcerative colitis Brother     Breast cancer Neg Hx     Ovarian cancer Neg Hx     Uterine cancer Neg Hx     Colon cancer Neg Hx     Pulmonary embolism Neg Hx     Deep vein thrombosis Neg Hx      "  Allergies   Allergen Reactions    Penicillins Hives     Broke out in a rash in kindergarden    Sulfa Antibiotics Hives     Hives in college age      Current Outpatient Medications on File Prior to Visit   Medication Sig Dispense Refill    albuterol sulfate  (90 Base) MCG/ACT inhaler Inhale 2 puffs Every 4 (Four) Hours As Needed for Wheezing. 18 g 1    Beclomethasone Diprop HFA (Qvar RediHaler) 40 MCG/ACT inhaler Inhale 2 puffs 2 (Two) Times a Day. 10.6 g 5    cetirizine (ZyrTEC) 10 MG tablet Take 1 tablet by mouth Daily.      ondansetron ODT (ZOFRAN-ODT) 4 MG disintegrating tablet Place 1 tablet on the tongue Every 8 (Eight) Hours As Needed for Nausea or Vomiting. 30 tablet 2    Prenatal MV-Min-Fe Fum-FA-DHA (PRENATAL 1 PO) Take 1 capsule by mouth Every Morning.      sertraline (ZOLOFT) 25 MG tablet TAKE 1 TABLET BY MOUTH DAILY 90 tablet 1     No current facility-administered medications on file prior to visit.        Past obstetric, gynecological, medical, surgical, family and social history reviewed.  Relevant lab work and imaging reviewed.    Review of systems  Constitutional:  denies fever, chills, malaise.   ENT/Mouth:  denies sore throat, tinnitus  Eyes: denies vision changes/pain  CV:  denies chest pain  Respiratory:  denies cough/SOB  GI:  denies N/V, diarrhea, abdominal pain.    :   denies dysuria  Skin:  denies lesions or pruritus   Neuro:  denies weakness, focal neurologic symptoms    Vitals:    03/27/24 1016 03/27/24 1018 03/27/24 1021   BP: 139/89 143/97 142/88   BP Location: Right arm Left arm Left arm   Patient Position: Sitting Sitting Sitting   Pulse: 80 85    Temp: 98 °F (36.7 °C)     TempSrc: Temporal     Weight: 130 kg (286 lb 6.4 oz)     Height: 167.6 cm (66\")         PHYSICAL EXAM   GENERAL: Not in acute distress, AAOx3, pleasant  CARDIO: regular rate and rhythm  PULM: symmetric chest rise, speaking in complete sentences without difficulty  NEURO: awake, alert and oriented to person, " place, and time  ABDOMINAL: No fundal tenderness, no rebound or guarding, gravid  EXTREMITIES: no bilateral lower extremity edema/tenderness  SKIN: Warm, well-perfused      ULTRASOUND   Please view full ultrasound note on Imaging tab in ViewPoint.  Dichorionic/diamniotic twin gestation.      A: Transverse presentation.    Anterior placenta.   MVP 5.8 cm, which is normal.    g (25 % , AC 14 %)  Spine remains suboptimally viewed.  Other anatomy appears normal.    UA dopplers normal forward flow without absent or reversed flow.      B: Transverse presentation.  Right lateral placenta.   MVP 5.7 cm, which is normal.    g (20% , AC  9%)  Anatomy appears normal.    UA dopplers normal forward flow without absent or reversed flow.    Discordance 7%, which is normal.   CL transabdominally >3.5 cm, which is normal.      ASSESSMENT/COUNSELIN y.o. G 3 P 0020 at 23  5/7 weeks gestation (Estimated Date of Delivery: 24).    -Pregnancy  [ X ] stable  [   ] improving [  ] worsening    Diagnoses and all orders for this visit:    1. Dichorionic diamniotic twin pregnancy, antepartum (Primary)    2. Mild persistent asthma without complication    3. Other obesity affecting pregnancy, antepartum    4. Hypertension, unspecified type    Other orders  -     NIFEdipine XL (Procardia XL) 30 MG 24 hr tablet; Take 1 tablet by mouth Daily.  Dispense: 30 tablet; Refill: 7         IVF Pregnancy  Previously counseled.  ECHO scheduled next week.  Serial growth exams are indicated.      Dichorionic twin gestation  Previously counseled.      FGR baby B, normal dopplers.    We discussed the potential underlying etiologies of  fetal growth restriction including but not limited to fetal chromosome disorder, syndromes, congenital infection, constitutional, and placental insufficiency.  I did  her that I can never rule out genetic causes by ultrasound, and that I recommend considering cell free DNA testing.   Cell free DNA  does not check for all genetic issues.  With congenital infections, we often see profound CNS findings such as ventriculomegaly, echogenic bowel, and even hydrops.  We do not see any of this today.  She declined cell free DNA testing.       I think the most likely cause of this is placental insufficiency.  Today we have normal umbilical artery dopplers which is reassuring.  We discussed umbilical artery doppler studies as 4 possible levels: normal, elevated, absent, and reversed.  We discussed that absent and reversed would require admission,  corticosteroids, and possibly delivery depending on gestational age and other testing.  She has FGR of baby B today so we will start weekly dopplers and     CHTN  No meds--starting procardia based on consistently 140-150's systolic in AM.    Acceptable blood pressures in pregnancies with chronic hypertension and without renal damage are 120-140/70-90s. Newer data from the [facility] (2022, CHAP TRIAL), supports more aggressive bp treatment to below 140/90 (previously ,160 in CHTN) and that this does not impair fetal growth or well-being but reduces risks of pre-eclampsia,  birth, and other pregnancy morbidity.  I discussed this with the patient. She has been logging her pressures.        Summary of Plan  -Repeat Growth in 4 weeks.   -Recommend panorama--pt declines.    -Careful attention to fetal movement and symptoms of pre-eclampsia-baseline labs/protein reasonable.  -Recommend 2 baby ASA  -If FGR found in 3 weeks will discuss surveillance  -Fetal ECHO ordered  -Started procardia 30 in AM.   -Delivery 37 weeks unless indicated sooner    Follow-up: weekly    Thank you for the consult and opportunity to care for this patient.  Please feel free to reach out with any questions or concerns.      I spent 29 minutes caring for this patient on this date of service. This time includes time spent by me in the following activities: preparing for the visit, reviewing  tests, obtaining and/or reviewing a separately obtained history, performing a medically appropriate examination and/or evaluation, counseling and educating the patient/family/caregiver and independently interpreting results and communicating that information with the patient/family/caregiver with greater than 50% spent in counseling and coordination of care.     4 minutes reading US.     Tamika Humphreys MD FACOG  Maternal Fetal Medicine-[facility]  Office: [phone]  [email or web address]

## 2024-03-27 NOTE — LETTER
March 27, 2024     Tanja Jeter MD  [address]  [address]  [place] [address]    Patient: Paola Neves   YOB: 1990   Date of Visit: 3/27/2024       Dear Tanja Jeter MD,    Thank you for referring Paola Neves to me for evaluation. Below is a copy of my consult note.    If you have questions, please do not hesitate to call me. I look forward to following Paola along with you.         Sincerely,        Tamika Humphreys MD      MATERNAL FETAL MEDICINE Consult Note    Dear Dr Tanja Jeter*:    Thank you for your kind referral of Paola Neves.  As you know, she is a 34 y.o. G 3 P 0020 at 23  5/7 weeks gestation (Estimated Date of Delivery: 7/19/24). This is a consult.     Her antepartum course is complicated by:  IVF  Di/Di Twins    Aneuploidy Screening: not yet    HPI: Today, she denies headache, blurry vision, RUQ pain. No vaginal bleeding, no contractions.     Review of History:  Past Medical History:   Diagnosis Date   • Abnormal Pap smear of cervix     6/2017 LGSIL/CIERA 1, 11/2017 normal   • Anxiety and depression 3/5/2024   • Asthma May 2007   • Female infertility    • Hypertension May 2016   • Ovarian cyst      Past Surgical History:   Procedure Laterality Date   • FERTILITY SURGERY  05/02/2022    IVF   • WISDOM TOOTH EXTRACTION  04/2017     Social History     Socioeconomic History   • Marital status:    • Number of children: 0   • Years of education: assoc degress   Tobacco Use   • Smoking status: Never   • Smokeless tobacco: Never   Vaping Use   • Vaping status: Never Used   Substance and Sexual Activity   • Alcohol use: Not Currently     Alcohol/week: 1.0 standard drink of alcohol     Types: 1 Glasses of wine per week     Comment: 1 a month   • Drug use: No   • Sexual activity: Yes     Partners: Male     Birth control/protection: None     Family History   Problem Relation Age of Onset   • Thyroid disease Mother         Graves   •  Ulcerative colitis Mother    • Hypertension Father    • Hearing loss Father    • Cancer Maternal Grandmother    • Asthma Paternal Grandmother    • Hearing loss Paternal Grandmother    • Heart disease Paternal Grandfather    • Diabetes Paternal Grandfather    • Cancer Paternal Grandfather    • No Known Problems Sister    • Ulcerative colitis Brother    • Breast cancer Neg Hx    • Ovarian cancer Neg Hx    • Uterine cancer Neg Hx    • Colon cancer Neg Hx    • Pulmonary embolism Neg Hx    • Deep vein thrombosis Neg Hx       Allergies   Allergen Reactions   • Penicillins Hives     Broke out in a rash in kindergarden   • Sulfa Antibiotics Hives     Hives in college age      Current Outpatient Medications on File Prior to Visit   Medication Sig Dispense Refill   • albuterol sulfate  (90 Base) MCG/ACT inhaler Inhale 2 puffs Every 4 (Four) Hours As Needed for Wheezing. 18 g 1   • Beclomethasone Diprop HFA (Qvar RediHaler) 40 MCG/ACT inhaler Inhale 2 puffs 2 (Two) Times a Day. 10.6 g 5   • cetirizine (ZyrTEC) 10 MG tablet Take 1 tablet by mouth Daily.     • ondansetron ODT (ZOFRAN-ODT) 4 MG disintegrating tablet Place 1 tablet on the tongue Every 8 (Eight) Hours As Needed for Nausea or Vomiting. 30 tablet 2   • Prenatal MV-Min-Fe Fum-FA-DHA (PRENATAL 1 PO) Take 1 capsule by mouth Every Morning.     • sertraline (ZOLOFT) 25 MG tablet TAKE 1 TABLET BY MOUTH DAILY 90 tablet 1     No current facility-administered medications on file prior to visit.        Past obstetric, gynecological, medical, surgical, family and social history reviewed.  Relevant lab work and imaging reviewed.    Review of systems  Constitutional:  denies fever, chills, malaise.   ENT/Mouth:  denies sore throat, tinnitus  Eyes: denies vision changes/pain  CV:  denies chest pain  Respiratory:  denies cough/SOB  GI:  denies N/V, diarrhea, abdominal pain.    :   denies dysuria  Skin:  denies lesions or pruritus   Neuro:  denies weakness, focal neurologic  "symptoms    Vitals:    24 1016 24 1018 24 1021   BP: 139/89 143/97 142/88   BP Location: Right arm Left arm Left arm   Patient Position: Sitting Sitting Sitting   Pulse: 80 85    Temp: 98 °F (36.7 °C)     TempSrc: Temporal     Weight: 130 kg (286 lb 6.4 oz)     Height: 167.6 cm (66\")         PHYSICAL EXAM   GENERAL: Not in acute distress, AAOx3, pleasant  CARDIO: regular rate and rhythm  PULM: symmetric chest rise, speaking in complete sentences without difficulty  NEURO: awake, alert and oriented to person, place, and time  ABDOMINAL: No fundal tenderness, no rebound or guarding, gravid  EXTREMITIES: no bilateral lower extremity edema/tenderness  SKIN: Warm, well-perfused      ULTRASOUND   Please view full ultrasound note on Imaging tab in ViewPoint.  Dichorionic/diamniotic twin gestation.      A: Transverse presentation.    Anterior placenta.   MVP 5.8 cm, which is normal.    g (25 % , AC 14 %)  Spine remains suboptimally viewed.  Other anatomy appears normal.    UA dopplers normal forward flow without absent or reversed flow.      B: Transverse presentation.  Right lateral placenta.   MVP 5.7 cm, which is normal.    g (20% , AC  9%)  Anatomy appears normal.    UA dopplers normal forward flow without absent or reversed flow.    Discordance 7%, which is normal.   CL transabdominally >3.5 cm, which is normal.      ASSESSMENT/COUNSELIN y.o. G 3 P 0020 at 23  5/7 weeks gestation (Estimated Date of Delivery: 24).    -Pregnancy  [ X ] stable  [   ] improving [  ] worsening    Diagnoses and all orders for this visit:    1. Dichorionic diamniotic twin pregnancy, antepartum (Primary)    2. Mild persistent asthma without complication    3. Other obesity affecting pregnancy, antepartum    4. Hypertension, unspecified type    Other orders  -     NIFEdipine XL (Procardia XL) 30 MG 24 hr tablet; Take 1 tablet by mouth Daily.  Dispense: 30 tablet; Refill: 7         IVF " Pregnancy  Previously counseled.  ECHO scheduled next week.  Serial growth exams are indicated.      Dichorionic twin gestation  Previously counseled.      FGR baby B, normal dopplers.    We discussed the potential underlying etiologies of  fetal growth restriction including but not limited to fetal chromosome disorder, syndromes, congenital infection, constitutional, and placental insufficiency.  I did  her that I can never rule out genetic causes by ultrasound, and that I recommend considering cell free DNA testing.   Cell free DNA does not check for all genetic issues.  With congenital infections, we often see profound CNS findings such as ventriculomegaly, echogenic bowel, and even hydrops.  We do not see any of this today.  She declined cell free DNA testing.       I think the most likely cause of this is placental insufficiency.  Today we have normal umbilical artery dopplers which is reassuring.  We discussed umbilical artery doppler studies as 4 possible levels: normal, elevated, absent, and reversed.  We discussed that absent and reversed would require admission,  corticosteroids, and possibly delivery depending on gestational age and other testing.  She has FGR of baby B today so we will start weekly dopplers and     CHTN  No meds--starting procardia based on consistently 140-150's systolic in AM.    Acceptable blood pressures in pregnancies with chronic hypertension and without renal damage are 120-140/70-90s. Newer data from the [facility] (2022, CHAP TRIAL), supports more aggressive bp treatment to below 140/90 (previously ,160 in CHTN) and that this does not impair fetal growth or well-being but reduces risks of pre-eclampsia,  birth, and other pregnancy morbidity.  I discussed this with the patient. She has been logging her pressures.        Summary of Plan  -Repeat Growth in 4 weeks.   -Recommend panorama--pt declines.    -Careful attention to fetal movement and symptoms of  pre-eclampsia-baseline labs/protein reasonable.  -Recommend 2 baby ASA  -If FGR found in 3 weeks will discuss surveillance  -Fetal ECHO ordered  -Started procardia 30 in AM.   -Delivery 37 weeks unless indicated sooner    Follow-up: weekly    Thank you for the consult and opportunity to care for this patient.  Please feel free to reach out with any questions or concerns.      I spent 29 minutes caring for this patient on this date of service. This time includes time spent by me in the following activities: preparing for the visit, reviewing tests, obtaining and/or reviewing a separately obtained history, performing a medically appropriate examination and/or evaluation, counseling and educating the patient/family/caregiver and independently interpreting results and communicating that information with the patient/family/caregiver with greater than 50% spent in counseling and coordination of care.     4 minutes reading US.     Tamika Humphreys MD FACOG  Maternal Fetal Medicine-[facility]  Office: [phone]  [email or web address]

## 2024-04-02 ENCOUNTER — HOSPITAL ENCOUNTER (OUTPATIENT)
Dept: ULTRASOUND IMAGING | Facility: HOSPITAL | Age: 34
Discharge: HOME | End: 2024-04-02
Admitting: OBSTETRICS & GYNECOLOGY
Payer: COMMERCIAL

## 2024-04-02 PROCEDURE — 76827 ECHO EXAM OF FETAL HEART: CPT

## 2024-04-02 PROCEDURE — 93325 DOPPLER ECHO COLOR FLOW MAPG: CPT

## 2024-04-02 PROCEDURE — 76825 ECHO EXAM OF FETAL HEART: CPT

## 2024-04-02 NOTE — PROGRESS NOTES
Fetal Cardiology Outpatient Consult  Note    Patient Name:Paola Neves  :1990  Medical Record Number:[id number]  Date of Service:2024  Requesting Obstetrician: Dr. Tamika Humphreys, [facility]  Primary Obstetrician: Dr. Tanja Jeter  Consult Location: [facility]    Reason for Visit:   Assisted Reproduction with IVF    History: I had the pleasure of seeing your patient, Paola Neves, today for a Fetal Cardiology Initial Consultation.  Fetal cardiology evaluation was requested due to  Assisted Reproduction with IVF .      Paola is a 34 y.o. woman who presents today at 24 weeks gestation, with a given due date of 2024.  This pregnancy was conceived using IVF.  NIPT was performed and was low risk.      OB History          3    Para   0    Term   0       0    AB   2    Living   0         SAB   2    IAB   0    Ectopic   0    Molar        Multiple   0    Live Births              Obstetric Comments   Desires pregnancy  2021- IVF, embryo transfer with SAB, no D&C  2022- embryo transfer with SAB, no D&C               Past Medical History:  Past Medical History:   Diagnosis Date    Abnormal Pap smear of cervix     2017 LGSIL/CIERA 1, 2017 normal    Anxiety and depression 3/5/2024    Asthma May 2007    Female infertility     Hypertension May 2016    Ovarian cyst        Current Medications:    Current Outpatient Medications:     albuterol sulfate  (90 Base) MCG/ACT inhaler, Inhale 2 puffs Every 4 (Four) Hours As Needed for Wheezing., Disp: 18 g, Rfl: 1    Beclomethasone Diprop HFA (Qvar RediHaler) 40 MCG/ACT inhaler, Inhale 2 puffs 2 (Two) Times a Day., Disp: 10.6 g, Rfl: 5    cetirizine (ZyrTEC) 10 MG tablet, Take 1 tablet by mouth Daily., Disp: , Rfl:     NIFEdipine XL (Procardia XL) 30 MG 24 hr tablet, Take 1 tablet by mouth Daily., Disp: 30 tablet, Rfl: 7    ondansetron ODT (ZOFRAN-ODT) 4 MG disintegrating tablet, Place 1  tablet on the tongue Every 8 (Eight) Hours As Needed for Nausea or Vomiting., Disp: 30 tablet, Rfl: 2    Prenatal MV-Min-Fe Fum-FA-DHA (PRENATAL 1 PO), Take 1 capsule by mouth Every Morning., Disp: , Rfl:     sertraline (ZOLOFT) 25 MG tablet, TAKE 1 TABLET BY MOUTH DAILY, Disp: 90 tablet, Rfl: 1    Family History:  Family History   Problem Relation Age of Onset    Thyroid disease Mother         Graves    Ulcerative colitis Mother     Hypertension Father     Hearing loss Father     Cancer Maternal Grandmother     Asthma Paternal Grandmother     Hearing loss Paternal Grandmother     Heart disease Paternal Grandfather     Diabetes Paternal Grandfather     Cancer Paternal Grandfather     No Known Problems Sister     Ulcerative colitis Brother     Breast cancer Neg Hx     Ovarian cancer Neg Hx     Uterine cancer Neg Hx     Colon cancer Neg Hx     Pulmonary embolism Neg Hx     Deep vein thrombosis Neg Hx      There is no known family history of congenital heart disease or genetic abnormalities or early childhood death.    Imaging: A complete 2-D, color, and spectral doppler fetal echocardiogram was performed.  A full report is available separately.  In summary, today's findings show the following:     - TWIN A: Normal fetal cardiac anatomy and function at 24 weeks gestation.  - TWIN B: Normal fetal cardiac anatomy and function at 24 weeks gestation.    A complete, detailed fetal echocardiogram can identify most major heart defects.  However, there are known limitations to this examination including a limited ability to diagnose some small atrial or ventricular septal defects, minor valve abnormalities, persistent patency of the ductus arteriosus, coarctation of the aorta, and abnormalities in small structures such as coronary arteries and pulmonary veins.    Discussion:   Findings were explained to patient and and her family.  The echo display screens in our examination room were used.  Questions were answered at length  and patient expressed understanding.  I explained the normal fetal circulation, today's fetal echocardiogram findings, the expected course of pregnancy, the impact of today's results on the location and mode of delivery and the expected  course.     Impression: In summary, today's evaluation found the followin) Normal fetal cardiac anatomy and function in both TWIN A and TWIN B/  2) 24 weeks gestation    Recommendations:   1. There is no evidence of a ductal dependent fetal cardiac lesion.  I do not anticipate the need for immediate initiation of prostaglandin therapy and pediatric cardiology evaluation after birth.  2. There is no fetal cardiac indication to delivery at a hospital which provides specialized pediatric cardiac care.  Paola is currently planning to delivery at [facility], which is appropriate from the standpoint of the fetal heart and circulation.  3. There is no increased fetal cardiac risk from a trial of labor and vaginal or cesearian delivery based on today's fetal cardiac findings.  4. Based on the results of today's examination, no further fetal echocardiograms are recommended during pregnancy.  5.  Recommend routine  and well  after birth by a primary care provider, further evaluation by pediatric cardiology is recommended only as needed.  6. I would be happy to see Paola again during pregnancy for any changes, problems, or concerns that may arise.  Please do not hesitate to call with any questions you may have.    Thank you for allowing me to share with you in the care of your patient.    I personally spent 60 minutes of direct provider time for the visit today, including review of prior OB and MFM medical records, face-to-face discussion and review of fetal cardiac images in the examination room, and charting.     Grady Kwong MD  [facility]  Pediatric Cardiology  [phone]  [phone]    4/2/2024  14:48 EDT

## 2024-04-09 ENCOUNTER — LAB (OUTPATIENT)
Dept: LAB | Facility: HOSPITAL | Age: 34
End: 2024-04-09
Payer: COMMERCIAL

## 2024-04-09 ENCOUNTER — HOSPITAL ENCOUNTER (OUTPATIENT)
Dept: ULTRASOUND IMAGING | Facility: HOSPITAL | Age: 34
Discharge: HOME | End: 2024-04-09
Payer: COMMERCIAL

## 2024-04-09 ENCOUNTER — OFFICE VISIT (OUTPATIENT)
Dept: OBSTETRICS AND GYNECOLOGY | Facility: CLINIC | Age: 34
End: 2024-04-09
Payer: COMMERCIAL

## 2024-04-09 VITALS
TEMPERATURE: 98.7 F | BODY MASS INDEX: 45.96 KG/M2 | SYSTOLIC BLOOD PRESSURE: 144 MMHG | WEIGHT: 286 LBS | HEIGHT: 66 IN | DIASTOLIC BLOOD PRESSURE: 96 MMHG

## 2024-04-09 DIAGNOSIS — I10 HYPERTENSION, UNSPECIFIED TYPE: ICD-10-CM

## 2024-04-09 DIAGNOSIS — O30.049 DICHORIONIC DIAMNIOTIC TWIN PREGNANCY, ANTEPARTUM: ICD-10-CM

## 2024-04-09 DIAGNOSIS — O30.049 DICHORIONIC DIAMNIOTIC TWIN PREGNANCY, ANTEPARTUM: Primary | ICD-10-CM

## 2024-04-09 LAB
ALBUMIN SERPL-MCNC: 3.6 G/DL (ref 3.5–5.2)
ALBUMIN/GLOB SERPL: 1.2 G/DL
ALP SERPL-CCNC: 96 U/L (ref 39–117)
ALT SERPL W P-5'-P-CCNC: 18 U/L (ref 1–33)
ANION GAP SERPL CALCULATED.3IONS-SCNC: 10 MMOL/L (ref 5–15)
AST SERPL-CCNC: 14 U/L (ref 1–32)
BASOPHILS # BLD AUTO: 0.08 10*3/MM3 (ref 0–0.2)
BASOPHILS NFR BLD AUTO: 0.5 % (ref 0–1.5)
BILIRUB SERPL-MCNC: 0.2 MG/DL (ref 0–1.2)
BUN SERPL-MCNC: 8 MG/DL (ref 6–20)
BUN/CREAT SERPL: 12.5 (ref 7–25)
CALCIUM SPEC-SCNC: 8.9 MG/DL (ref 8.6–10.5)
CHLORIDE SERPL-SCNC: 104 MMOL/L (ref 98–107)
CO2 SERPL-SCNC: 26 MMOL/L (ref 22–29)
CREAT SERPL-MCNC: 0.64 MG/DL (ref 0.57–1)
CREAT UR-MCNC: 178.1 MG/DL
DEPRECATED RDW RBC AUTO: 44.8 FL (ref 37–54)
EGFRCR SERPLBLD CKD-EPI 2021: 119.1 ML/MIN/1.73
EOSINOPHIL # BLD AUTO: 0.33 10*3/MM3 (ref 0–0.4)
EOSINOPHIL NFR BLD AUTO: 2.1 % (ref 0.3–6.2)
ERYTHROCYTE [DISTWIDTH] IN BLOOD BY AUTOMATED COUNT: 14.1 % (ref 12.3–15.4)
GLOBULIN UR ELPH-MCNC: 3 GM/DL
GLUCOSE SERPL-MCNC: 89 MG/DL (ref 65–99)
HCT VFR BLD AUTO: 39 % (ref 34–46.6)
HGB BLD-MCNC: 12.8 G/DL (ref 12–15.9)
IMM GRANULOCYTES # BLD AUTO: 0.16 10*3/MM3 (ref 0–0.05)
IMM GRANULOCYTES NFR BLD AUTO: 1 % (ref 0–0.5)
LDH SERPL-CCNC: 177 U/L (ref 135–214)
LYMPHOCYTES # BLD AUTO: 1.72 10*3/MM3 (ref 0.7–3.1)
LYMPHOCYTES NFR BLD AUTO: 11.1 % (ref 19.6–45.3)
MCH RBC QN AUTO: 29 PG (ref 26.6–33)
MCHC RBC AUTO-ENTMCNC: 32.8 G/DL (ref 31.5–35.7)
MCV RBC AUTO: 88.4 FL (ref 79–97)
MONOCYTES # BLD AUTO: 0.69 10*3/MM3 (ref 0.1–0.9)
MONOCYTES NFR BLD AUTO: 4.4 % (ref 5–12)
NEUTROPHILS NFR BLD AUTO: 12.53 10*3/MM3 (ref 1.7–7)
NEUTROPHILS NFR BLD AUTO: 80.9 % (ref 42.7–76)
NRBC BLD AUTO-RTO: 0 /100 WBC (ref 0–0.2)
PLATELET # BLD AUTO: 297 10*3/MM3 (ref 140–450)
PMV BLD AUTO: 10.5 FL (ref 6–12)
POTASSIUM SERPL-SCNC: 4 MMOL/L (ref 3.5–5.2)
PROT ?TM UR-MCNC: 14.1 MG/DL
PROT SERPL-MCNC: 6.6 G/DL (ref 6–8.5)
PROT/CREAT UR: 79.2 MG/G CREA (ref 0–200)
RBC # BLD AUTO: 4.41 10*6/MM3 (ref 3.77–5.28)
SODIUM SERPL-SCNC: 140 MMOL/L (ref 136–145)
URATE SERPL-MCNC: 4.7 MG/DL (ref 2.4–5.7)
WBC NRBC COR # BLD AUTO: 15.51 10*3/MM3 (ref 3.4–10.8)

## 2024-04-09 PROCEDURE — 76820 UMBILICAL ARTERY ECHO: CPT

## 2024-04-09 PROCEDURE — 84156 ASSAY OF PROTEIN URINE: CPT

## 2024-04-09 PROCEDURE — 84550 ASSAY OF BLOOD/URIC ACID: CPT

## 2024-04-09 PROCEDURE — 76820 UMBILICAL ARTERY ECHO: CPT | Performed by: OBSTETRICS & GYNECOLOGY

## 2024-04-09 PROCEDURE — 80053 COMPREHEN METABOLIC PANEL: CPT

## 2024-04-09 PROCEDURE — 82570 ASSAY OF URINE CREATININE: CPT

## 2024-04-09 PROCEDURE — 76819 FETAL BIOPHYS PROFIL W/O NST: CPT | Performed by: OBSTETRICS & GYNECOLOGY

## 2024-04-09 PROCEDURE — 36415 COLL VENOUS BLD VENIPUNCTURE: CPT

## 2024-04-09 PROCEDURE — 83615 LACTATE (LD) (LDH) ENZYME: CPT

## 2024-04-09 PROCEDURE — 76819 FETAL BIOPHYS PROFIL W/O NST: CPT

## 2024-04-09 PROCEDURE — 85025 COMPLETE CBC W/AUTO DIFF WBC: CPT

## 2024-04-09 PROCEDURE — 99213 OFFICE O/P EST LOW 20 MIN: CPT | Performed by: OBSTETRICS & GYNECOLOGY

## 2024-04-09 NOTE — PROGRESS NOTES
MATERNAL FETAL MEDICINE Consult Note    Dear Dr Tanja Jeter*:    Thank you for your kind referral of Paola Neves.  As you know, she is a 34 y.o. G 3 P 0020 at 25  4/7 weeks gestation (Estimated Date of Delivery: 7/19/24). This is a consult.     Her antepartum course is complicated by:  IVF  Di/Di Twins  CHTN    Aneuploidy Screening: declined    HPI: Today, she denies headache, blurry vision, RUQ pain. No vaginal bleeding, no contractions.     Review of History:  Past Medical History:   Diagnosis Date    Abnormal Pap smear of cervix     6/2017 LGSIL/CIERA 1, 11/2017 normal    Anxiety and depression 3/5/2024    Asthma May 2007    Female infertility     Hypertension May 2016    Ovarian cyst      Past Surgical History:   Procedure Laterality Date    FERTILITY SURGERY  05/02/2022    IVF    WISDOM TOOTH EXTRACTION  04/2017     Social History     Socioeconomic History    Marital status:     Number of children: 0    Years of education: assoc degress   Tobacco Use    Smoking status: Never    Smokeless tobacco: Never   Vaping Use    Vaping status: Never Used   Substance and Sexual Activity    Alcohol use: Not Currently     Alcohol/week: 1.0 standard drink of alcohol     Types: 1 Glasses of wine per week     Comment: 1 a month    Drug use: No    Sexual activity: Yes     Partners: Male     Birth control/protection: None     Family History   Problem Relation Age of Onset    Thyroid disease Mother         Graves    Ulcerative colitis Mother     Hypertension Father     Hearing loss Father     Cancer Maternal Grandmother     Asthma Paternal Grandmother     Hearing loss Paternal Grandmother     Heart disease Paternal Grandfather     Diabetes Paternal Grandfather     Cancer Paternal Grandfather     No Known Problems Sister     Ulcerative colitis Brother     Breast cancer Neg Hx     Ovarian cancer Neg Hx     Uterine cancer Neg Hx     Colon cancer Neg Hx     Pulmonary embolism Neg Hx     Deep vein thrombosis Neg  "Hx       Allergies   Allergen Reactions    Penicillins Hives     Broke out in a rash in kindergarden    Sulfa Antibiotics Hives     Hives in college age      Current Outpatient Medications on File Prior to Visit   Medication Sig Dispense Refill    albuterol sulfate  (90 Base) MCG/ACT inhaler Inhale 2 puffs Every 4 (Four) Hours As Needed for Wheezing. 18 g 1    Beclomethasone Diprop HFA (Qvar RediHaler) 40 MCG/ACT inhaler Inhale 2 puffs 2 (Two) Times a Day. 10.6 g 5    cetirizine (ZyrTEC) 10 MG tablet Take 1 tablet by mouth Daily.      NIFEdipine XL (Procardia XL) 30 MG 24 hr tablet Take 1 tablet by mouth Daily. 30 tablet 7    ondansetron ODT (ZOFRAN-ODT) 4 MG disintegrating tablet Place 1 tablet on the tongue Every 8 (Eight) Hours As Needed for Nausea or Vomiting. 30 tablet 2    sertraline (ZOLOFT) 25 MG tablet TAKE 1 TABLET BY MOUTH DAILY 90 tablet 1    Prenatal MV-Min-Fe Fum-FA-DHA (PRENATAL 1 PO) Take 1 capsule by mouth Every Morning. (Patient not taking: Reported on 4/9/2024)       No current facility-administered medications on file prior to visit.        Past obstetric, gynecological, medical, surgical, family and social history reviewed.  Relevant lab work and imaging reviewed.    Review of systems  Constitutional:  denies fever, chills, malaise.   ENT/Mouth:  denies sore throat, tinnitus  Eyes: denies vision changes/pain  CV:  denies chest pain  Respiratory:  denies cough/SOB  GI:  denies N/V, diarrhea, abdominal pain.    :   denies dysuria  Skin:  denies lesions or pruritus   Neuro:  denies weakness, focal neurologic symptoms    Vitals:    04/09/24 0824 04/09/24 0829 04/09/24 0910 04/09/24 0912   BP: (!) 138/102 (!) 136/104 152/100 144/96   BP Location: Right arm Right arm Left arm Right arm   Patient Position: Sitting Sitting Sitting Sitting   Temp: 98.7 °F (37.1 °C)      TempSrc: Temporal      Weight: 130 kg (286 lb)      Height: 167.6 cm (66\")          PHYSICAL EXAM   GENERAL: Not in acute " distress, AAOx3, pleasant  CARDIO: regular rate and rhythm  PULM: symmetric chest rise, speaking in complete sentences without difficulty  NEURO: awake, alert and oriented to person, place, and time  ABDOMINAL: No fundal tenderness, no rebound or guarding, gravid  EXTREMITIES: no bilateral lower extremity edema/tenderness  SKIN: Warm, well-perfused      ULTRASOUND   Please view full ultrasound note on Imaging tab in ViewPoint.  Dichorionic/diamniotic twin gestation.      A: Transverse presentation.    Anterior placenta.   MVP 5.2 cm, which is normal.   Spine remains suboptimally viewed due to position.    UA dopplers normal forward flow without absent or reversed flow.    BPP 8/8    B: Transverse presentation.  Right lateral placenta.   MVP 5.2 cm, which is normal.   UA dopplers normal forward flow without absent or reversed flow.    BPP 8/8    ASSESSMENT/COUNSELIN y.o. G 3 P 0020 at 25  4/7 weeks gestation (Estimated Date of Delivery: 24).    -Pregnancy  [ X ] stable  [   ] improving [  ] worsening    There are no diagnoses linked to this encounter.       IVF Pregnancy  Previously counseled.  ECHO normal.  Serial growth exams are indicated.      Dichorionic twin gestation  Previously counseled.      FGR baby B, normal dopplers.    Previously counseled.  We discussed the potential underlying etiologies of  fetal growth restriction including but not limited to fetal chromosome disorder, syndromes, congenital infection, constitutional, and placental insufficiency.  I did  her that I can never rule out genetic causes by ultrasound, and that I recommend considering cell free DNA testing.   Cell free DNA does not check for all genetic issues.  With congenital infections, we often see profound CNS findings such as ventriculomegaly, echogenic bowel, and even hydrops.  We do not see any of this today.  She declined cell free DNA testing.       I think the most likely cause of this is placental  insufficiency.  Today we have normal umbilical artery dopplers which is reassuring.  We discussed umbilical artery doppler studies as 4 possible levels: normal, elevated, absent, and reversed.  We discussed that absent and reversed would require admission,  corticosteroids, and possibly delivery depending on gestational age and other testing.  She has FGR of baby B today so we will start weekly dopplers and BPP's.      CHTN  Procardia 30 mg daily--increased to 60 for mid to high mild range pressures.  She denies swelling, HA, vision changes, etc.    Acceptable blood pressures in pregnancies with chronic hypertension and without renal damage are 120-140/70-90s. Newer data from the [facility] (2022, CHAP TRIAL), supports more aggressive bp treatment to below 140/90 (previously ,160 in CHTN) and that this does not impair fetal growth or well-being but reduces risks of pre-eclampsia,  birth, and other pregnancy morbidity.  I discussed this with the patient. She has been logging her pressures.  We will get labs today and increase BP meds.  If labs abnormal may need to come in for obs, but will continue to monitor.     Discussed delivery timing.  At this point 36-37 weeks is appropriate given twins, CHTN, FGR.      Summary of Plan  -Repeat Growth in 4 weeks.   -PIH labs and P/C today  -Recommend 2 baby ASA  -Increased procardia 60 in AM.   -Delivery 36-37 weeks.      Follow-up: weekly    Thank you for the consult and opportunity to care for this patient.  Please feel free to reach out with any questions or concerns.      I spent 25 minutes caring for this patient on this date of service. This time includes time spent by me in the following activities: preparing for the visit, reviewing tests, obtaining and/or reviewing a separately obtained history, performing a medically appropriate examination and/or evaluation, counseling and educating the patient/family/caregiver and independently interpreting results  and communicating that information with the patient/family/caregiver with greater than 50% spent in counseling and coordination of care.     4 minutes reading US.     Tamika Humphreys MD [facility]  Maternal Fetal Medicine-[facility]  Office: [phone]  [email or web address]

## 2024-04-09 NOTE — PROGRESS NOTES
Pt reports that she is doing well and denies vaginal bleeding, cramping, contractions or LOF at this time. Reports active fetal movement times 2. Reviewed when to call OB office or present to L&D for evaluation with symptoms such as decreased fetal movement, vaginal bleeding, LOF or ctxs. Pt verbalized understanding. Denies HA, visual changes or epigastric pain. BP elevated in MFM office will repeat after ultrasound, Pt reports checking blood pressure twice at home a day with normal values. Denies any additional complaints at time of appointment. Next OB appointment scheduled for 04/11/2024    Vitals:    04/09/24 0829   BP: (!) 136/104   Temp:

## 2024-04-09 NOTE — LETTER
April 9, 2024     Tanja Jeter MD  [address]  [address]  [place] [address]    Patient: Paola Neves   YOB: 1990   Date of Visit: 4/9/2024       Dear Tanja Jeter MD    Paola Neves was in my office today. Below is a copy of my note.    If you have questions, please do not hesitate to call me. I look forward to following Paola along with you.         Sincerely,        Tamika Humphreys MD      MATERNAL FETAL MEDICINE Consult Note    Dear Dr Tanja Jeter*:    Thank you for your kind referral of Paola Neves.  As you know, she is a 34 y.o. G 3 P 0020 at 25  4/7 weeks gestation (Estimated Date of Delivery: 7/19/24). This is a consult.     Her antepartum course is complicated by:  IVF  Di/Di Twins  CHTN    Aneuploidy Screening: declined    HPI: Today, she denies headache, blurry vision, RUQ pain. No vaginal bleeding, no contractions.     Review of History:  Past Medical History:   Diagnosis Date   • Abnormal Pap smear of cervix     6/2017 LGSIL/CIERA 1, 11/2017 normal   • Anxiety and depression 3/5/2024   • Asthma May 2007   • Female infertility    • Hypertension May 2016   • Ovarian cyst      Past Surgical History:   Procedure Laterality Date   • FERTILITY SURGERY  05/02/2022    IVF   • WISDOM TOOTH EXTRACTION  04/2017     Social History     Socioeconomic History   • Marital status:    • Number of children: 0   • Years of education: assoc degress   Tobacco Use   • Smoking status: Never   • Smokeless tobacco: Never   Vaping Use   • Vaping status: Never Used   Substance and Sexual Activity   • Alcohol use: Not Currently     Alcohol/week: 1.0 standard drink of alcohol     Types: 1 Glasses of wine per week     Comment: 1 a month   • Drug use: No   • Sexual activity: Yes     Partners: Male     Birth control/protection: None     Family History   Problem Relation Age of Onset   • Thyroid disease Mother         Graves   • Ulcerative colitis Mother    •  Hypertension Father    • Hearing loss Father    • Cancer Maternal Grandmother    • Asthma Paternal Grandmother    • Hearing loss Paternal Grandmother    • Heart disease Paternal Grandfather    • Diabetes Paternal Grandfather    • Cancer Paternal Grandfather    • No Known Problems Sister    • Ulcerative colitis Brother    • Breast cancer Neg Hx    • Ovarian cancer Neg Hx    • Uterine cancer Neg Hx    • Colon cancer Neg Hx    • Pulmonary embolism Neg Hx    • Deep vein thrombosis Neg Hx       Allergies   Allergen Reactions   • Penicillins Hives     Broke out in a rash in kindergarden   • Sulfa Antibiotics Hives     Hives in college age      Current Outpatient Medications on File Prior to Visit   Medication Sig Dispense Refill   • albuterol sulfate  (90 Base) MCG/ACT inhaler Inhale 2 puffs Every 4 (Four) Hours As Needed for Wheezing. 18 g 1   • Beclomethasone Diprop HFA (Qvar RediHaler) 40 MCG/ACT inhaler Inhale 2 puffs 2 (Two) Times a Day. 10.6 g 5   • cetirizine (ZyrTEC) 10 MG tablet Take 1 tablet by mouth Daily.     • NIFEdipine XL (Procardia XL) 30 MG 24 hr tablet Take 1 tablet by mouth Daily. 30 tablet 7   • ondansetron ODT (ZOFRAN-ODT) 4 MG disintegrating tablet Place 1 tablet on the tongue Every 8 (Eight) Hours As Needed for Nausea or Vomiting. 30 tablet 2   • sertraline (ZOLOFT) 25 MG tablet TAKE 1 TABLET BY MOUTH DAILY 90 tablet 1   • Prenatal MV-Min-Fe Fum-FA-DHA (PRENATAL 1 PO) Take 1 capsule by mouth Every Morning. (Patient not taking: Reported on 4/9/2024)       No current facility-administered medications on file prior to visit.        Past obstetric, gynecological, medical, surgical, family and social history reviewed.  Relevant lab work and imaging reviewed.    Review of systems  Constitutional:  denies fever, chills, malaise.   ENT/Mouth:  denies sore throat, tinnitus  Eyes: denies vision changes/pain  CV:  denies chest pain  Respiratory:  denies cough/SOB  GI:  denies N/V, diarrhea, abdominal  "pain.    :   denies dysuria  Skin:  denies lesions or pruritus   Neuro:  denies weakness, focal neurologic symptoms    Vitals:    24 0824 24 0829 24 0910 24 0912   BP: (!) 138/102 (!) 136/104 152/100 144/96   BP Location: Right arm Right arm Left arm Right arm   Patient Position: Sitting Sitting Sitting Sitting   Temp: 98.7 °F (37.1 °C)      TempSrc: Temporal      Weight: 130 kg (286 lb)      Height: 167.6 cm (66\")          PHYSICAL EXAM   GENERAL: Not in acute distress, AAOx3, pleasant  CARDIO: regular rate and rhythm  PULM: symmetric chest rise, speaking in complete sentences without difficulty  NEURO: awake, alert and oriented to person, place, and time  ABDOMINAL: No fundal tenderness, no rebound or guarding, gravid  EXTREMITIES: no bilateral lower extremity edema/tenderness  SKIN: Warm, well-perfused      ULTRASOUND   Please view full ultrasound note on Imaging tab in ViewPoint.  Dichorionic/diamniotic twin gestation.      A: Transverse presentation.    Anterior placenta.   MVP 5.2 cm, which is normal.   Spine remains suboptimally viewed due to position.    UA dopplers normal forward flow without absent or reversed flow.    BPP 8/8    B: Transverse presentation.  Right lateral placenta.   MVP 5.2 cm, which is normal.   UA dopplers normal forward flow without absent or reversed flow.    BPP 8/8    ASSESSMENT/COUNSELIN y.o. G 3 P 0020 at 25  4/7 weeks gestation (Estimated Date of Delivery: 24).    -Pregnancy  [ X ] stable  [   ] improving [  ] worsening    There are no diagnoses linked to this encounter.       IVF Pregnancy  Previously counseled.  ECHO normal.  Serial growth exams are indicated.      Dichorionic twin gestation  Previously counseled.      FGR baby B, normal dopplers.    Previously counseled.  We discussed the potential underlying etiologies of  fetal growth restriction including but not limited to fetal chromosome disorder, syndromes, congenital infection, " constitutional, and placental insufficiency.  I did  her that I can never rule out genetic causes by ultrasound, and that I recommend considering cell free DNA testing.   Cell free DNA does not check for all genetic issues.  With congenital infections, we often see profound CNS findings such as ventriculomegaly, echogenic bowel, and even hydrops.  We do not see any of this today.  She declined cell free DNA testing.       I think the most likely cause of this is placental insufficiency.  Today we have normal umbilical artery dopplers which is reassuring.  We discussed umbilical artery doppler studies as 4 possible levels: normal, elevated, absent, and reversed.  We discussed that absent and reversed would require admission,  corticosteroids, and possibly delivery depending on gestational age and other testing.  She has FGR of baby B today so we will start weekly dopplers and BPP's.      CHTN  Procardia 30 mg daily--increased to 60 for mid to high mild range pressures.  She denies swelling, HA, vision changes, etc.    Acceptable blood pressures in pregnancies with chronic hypertension and without renal damage are 120-140/70-90s. Newer data from the [facility] (2022, CHAP TRIAL), supports more aggressive bp treatment to below 140/90 (previously ,160 in CHTN) and that this does not impair fetal growth or well-being but reduces risks of pre-eclampsia,  birth, and other pregnancy morbidity.  I discussed this with the patient. She has been logging her pressures.  We will get labs today and increase BP meds.  If labs abnormal may need to come in for obs, but will continue to monitor.     Discussed delivery timing.  At this point 36-37 weeks is appropriate given twins, CHTN, FGR.      Summary of Plan  -Repeat Growth in 4 weeks.   -PIH labs and P/C today  -Recommend 2 baby ASA  -Increased procardia 60 in AM.   -Delivery 36-37 weeks.      Follow-up: weekly    Thank you for the consult and opportunity to  care for this patient.  Please feel free to reach out with any questions or concerns.      I spent 25 minutes caring for this patient on this date of service. This time includes time spent by me in the following activities: preparing for the visit, reviewing tests, obtaining and/or reviewing a separately obtained history, performing a medically appropriate examination and/or evaluation, counseling and educating the patient/family/caregiver and independently interpreting results and communicating that information with the patient/family/caregiver with greater than 50% spent in counseling and coordination of care.     4 minutes reading US.     Tamika Humphreys MD FACOG  Maternal Fetal Medicine-[facility]  Office: [phone]  [email or web address]

## 2024-04-11 ENCOUNTER — ROUTINE PRENATAL (OUTPATIENT)
Dept: OBSTETRICS AND GYNECOLOGY | Facility: CLINIC | Age: 34
End: 2024-04-11
Payer: COMMERCIAL

## 2024-04-11 VITALS — BODY MASS INDEX: 46.16 KG/M2 | WEIGHT: 286 LBS | SYSTOLIC BLOOD PRESSURE: 136 MMHG | DIASTOLIC BLOOD PRESSURE: 88 MMHG

## 2024-04-11 DIAGNOSIS — O30.049 DICHORIONIC DIAMNIOTIC TWIN PREGNANCY, ANTEPARTUM: ICD-10-CM

## 2024-04-11 DIAGNOSIS — R12 HEARTBURN DURING PREGNANCY, ANTEPARTUM: ICD-10-CM

## 2024-04-11 DIAGNOSIS — O09.899 RUBELLA NON-IMMUNE STATUS, ANTEPARTUM: ICD-10-CM

## 2024-04-11 DIAGNOSIS — O09.819 PREGNANCY RESULTING FROM IN VITRO FERTILIZATION, ANTEPARTUM: ICD-10-CM

## 2024-04-11 DIAGNOSIS — F32.A ANXIETY AND DEPRESSION: ICD-10-CM

## 2024-04-11 DIAGNOSIS — I10 HYPERTENSION, UNSPECIFIED TYPE: ICD-10-CM

## 2024-04-11 DIAGNOSIS — F41.9 ANXIETY AND DEPRESSION: ICD-10-CM

## 2024-04-11 DIAGNOSIS — Z34.93 PRENATAL CARE IN THIRD TRIMESTER: Primary | ICD-10-CM

## 2024-04-11 DIAGNOSIS — O26.899 HEARTBURN DURING PREGNANCY, ANTEPARTUM: ICD-10-CM

## 2024-04-11 DIAGNOSIS — Z28.39 RUBELLA NON-IMMUNE STATUS, ANTEPARTUM: ICD-10-CM

## 2024-04-11 RX ORDER — FAMOTIDINE 20 MG/1
20 TABLET, FILM COATED ORAL 2 TIMES DAILY
Qty: 60 TABLET | Refills: 3 | Status: SHIPPED | OUTPATIENT
Start: 2024-04-11 | End: 2025-04-11

## 2024-04-11 NOTE — PROGRESS NOTES
OB follow up > 20 weeks    Chief Complaint   Patient presents with    Routine Prenatal Visit       Paola Neves is a 34 y.o.  25w6d being seen today for her obstetrical visit.  This patient is new to me - no.  Patient reports heartburn. Taking prenatal vitamins: Yes and Procardia 60 mg daily with baby asa x 2.  Brought BP log with her; asymptomatic.  Followed by MFM      Review of Systems  Genitourinary: Negative for contractions, cramping, vaginal bleeding, or SROM.   Fetal movement: normal  Allergies   Allergen Reactions    Penicillins Hives     Broke out in a rash in kindergarden    Sulfa Antibiotics Hives     Hives in college age        /88   Wt 130 kg (286 lb)   BMI 46.16 kg/m²     FHT:  Baby A-157 BPM; Baby B- 157 BPM   Uterine Size: twins       Assessment     1) pregnancy at 25w6d-  di-di twins.     3) Declines AFP/NIPT.      4) IVF- 2 donated embryos- Fetal echo normal. Declines carrier screening.      5) CHTN- on  mg. No meds. 24 hour urine pending. Reviewed BP log- acceptable     Summary of Plan  -Repeat Growth in 4 weeks.   -PIH labs and P/C today  -Recommend 2 baby ASA  -Increased procardia 60 in AM.   -Delivery 36-37 weeks.      2024- Pr:Cr=79.2, CBC,CMP, LDH, uric acid WNL     6) RNI- needs MMR pp . Avoid those with fevers and rashes.      7) Anxiety and depression- doing well on Zoloft 25 mg     8) Asthma- mild. Has inhaler at home for prn use.      9) BMI 45- will need growth US q 4 weeks  CHTN    10) Urine Cx+ with yeast; repeat at next appt    11) heartburn- taking TUMS; ERX Pepcic BID      Plan    Continue prenatal vitamins  Reviewed this stage of pregnancy  Problem list updated   Follow up in 3 weeks for OBT for 2 hr GTT/HH, Tdap; sees MFM weekly    Beckie Barros, APRN  2024  12:20 EDT

## 2024-04-18 ENCOUNTER — HOSPITAL ENCOUNTER (OUTPATIENT)
Dept: ULTRASOUND IMAGING | Facility: HOSPITAL | Age: 34
Discharge: HOME | End: 2024-04-18
Admitting: OBSTETRICS & GYNECOLOGY
Payer: COMMERCIAL

## 2024-04-18 ENCOUNTER — OFFICE VISIT (OUTPATIENT)
Dept: OBSTETRICS AND GYNECOLOGY | Facility: CLINIC | Age: 34
End: 2024-04-18
Payer: COMMERCIAL

## 2024-04-18 VITALS
TEMPERATURE: 99.6 F | BODY MASS INDEX: 46.45 KG/M2 | SYSTOLIC BLOOD PRESSURE: 148 MMHG | WEIGHT: 289 LBS | HEART RATE: 111 BPM | HEIGHT: 66 IN | DIASTOLIC BLOOD PRESSURE: 96 MMHG

## 2024-04-18 DIAGNOSIS — O30.049 DICHORIONIC DIAMNIOTIC TWIN PREGNANCY, ANTEPARTUM: Primary | ICD-10-CM

## 2024-04-18 DIAGNOSIS — I10 HYPERTENSION, UNSPECIFIED TYPE: ICD-10-CM

## 2024-04-18 PROBLEM — O36.5922 IUGR (INTRAUTERINE GROWTH RESTRICTION) AFFECTING CARE OF MOTHER, SECOND TRIMESTER, FETUS 2: Status: ACTIVE | Noted: 2024-04-18

## 2024-04-18 PROCEDURE — 76820 UMBILICAL ARTERY ECHO: CPT

## 2024-04-18 PROCEDURE — 76819 FETAL BIOPHYS PROFIL W/O NST: CPT

## 2024-04-18 RX ORDER — NIFEDIPINE 60 MG/1
60 TABLET, EXTENDED RELEASE ORAL DAILY
Qty: 90 TABLET | Refills: 10 | Status: SHIPPED | OUTPATIENT
Start: 2024-04-18

## 2024-04-18 NOTE — LETTER
April 18, 2024       No Recipients    Patient: Paola Neves   YOB: 1990   Date of Visit: 4/18/2024       Dear Tanja Jeter MD    Paola Neves was in my office today. Below is a copy of my note.    If you have questions, please do not hesitate to call me. I look forward to following Paola along with you.         Sincerely,        Tamika Humphreys MD      MATERNAL FETAL MEDICINE Consult Note    Dear Dr Tanja Jeter*:    Thank you for your kind referral of Paola Neves.  As you know, she is a 34 y.o. G 3 P 0020 at 26  6/7 weeks gestation (Estimated Date of Delivery: 7/19/24). This is a consult.     Her antepartum course is complicated by:  IVF  Di/Di Twins  CHTN    Aneuploidy Screening: declined    HPI: Today, she denies headache, blurry vision, RUQ pain. No vaginal bleeding, no contractions.     Review of History:  Past Medical History:   Diagnosis Date   • Abnormal Pap smear of cervix     6/2017 LGSIL/CIERA 1, 11/2017 normal   • Anxiety and depression 3/5/2024   • Asthma May 2007   • Female infertility    • Hypertension May 2016   • Ovarian cyst      Past Surgical History:   Procedure Laterality Date   • FERTILITY SURGERY  05/02/2022    IVF   • WISDOM TOOTH EXTRACTION  04/2017     Social History     Socioeconomic History   • Marital status:    • Number of children: 0   • Years of education: assoc degress   Tobacco Use   • Smoking status: Never   • Smokeless tobacco: Never   Vaping Use   • Vaping status: Never Used   Substance and Sexual Activity   • Alcohol use: Not Currently     Alcohol/week: 1.0 standard drink of alcohol     Types: 1 Glasses of wine per week     Comment: 1 a month   • Drug use: No   • Sexual activity: Yes     Partners: Male     Birth control/protection: None     Family History   Problem Relation Age of Onset   • Thyroid disease Mother         Graves   • Ulcerative colitis Mother    • Hypertension Father    • Hearing loss Father    • Cancer  Maternal Grandmother    • Asthma Paternal Grandmother    • Hearing loss Paternal Grandmother    • Heart disease Paternal Grandfather    • Diabetes Paternal Grandfather    • Cancer Paternal Grandfather    • No Known Problems Sister    • Ulcerative colitis Brother    • Breast cancer Neg Hx    • Ovarian cancer Neg Hx    • Uterine cancer Neg Hx    • Colon cancer Neg Hx    • Pulmonary embolism Neg Hx    • Deep vein thrombosis Neg Hx       Allergies   Allergen Reactions   • Penicillins Hives     Broke out in a rash in kindergarden   • Sulfa Antibiotics Hives     Hives in college age      Current Outpatient Medications on File Prior to Visit   Medication Sig Dispense Refill   • albuterol sulfate  (90 Base) MCG/ACT inhaler Inhale 2 puffs Every 4 (Four) Hours As Needed for Wheezing. 18 g 1   • Beclomethasone Diprop HFA (Qvar RediHaler) 40 MCG/ACT inhaler Inhale 2 puffs 2 (Two) Times a Day. 10.6 g 5   • cetirizine (ZyrTEC) 10 MG tablet Take 1 tablet by mouth Daily.     • famotidine (Pepcid) 20 MG tablet Take 1 tablet by mouth 2 (Two) Times a Day. 60 tablet 3   • NIFEdipine XL (Procardia XL) 30 MG 24 hr tablet Take 1 tablet by mouth Daily. 30 tablet 7   • ondansetron ODT (ZOFRAN-ODT) 4 MG disintegrating tablet Place 1 tablet on the tongue Every 8 (Eight) Hours As Needed for Nausea or Vomiting. 30 tablet 2   • sertraline (ZOLOFT) 25 MG tablet TAKE 1 TABLET BY MOUTH DAILY 90 tablet 1     No current facility-administered medications on file prior to visit.        Past obstetric, gynecological, medical, surgical, family and social history reviewed.  Relevant lab work and imaging reviewed.    Review of systems  Constitutional:  denies fever, chills, malaise.   ENT/Mouth:  denies sore throat, tinnitus  Eyes: denies vision changes/pain  CV:  denies chest pain  Respiratory:  denies cough/SOB  GI:  denies N/V, diarrhea, abdominal pain.    :   denies dysuria  Skin:  denies lesions or pruritus   Neuro:  denies weakness, focal  "neurologic symptoms    Vitals:    24 0700 24 0815 24 0921 24 0923   BP: (!) 165/103 148/98 144/100 148/96   BP Location: Right arm Left arm Right arm Left arm   Patient Position: Sitting Sitting Sitting Sitting   Pulse: 111      Temp: 99.6 °F (37.6 °C)      TempSrc: Temporal      Weight: 131 kg (289 lb)      Height: 167.6 cm (66\")          PHYSICAL EXAM   GENERAL: Not in acute distress, AAOx3, pleasant  CARDIO: regular rate and rhythm  PULM: symmetric chest rise, speaking in complete sentences without difficulty  NEURO: awake, alert and oriented to person, place, and time  ABDOMINAL: No fundal tenderness, no rebound or guarding, gravid  EXTREMITIES: no bilateral lower extremity edema/tenderness  SKIN: Warm, well-perfused      ULTRASOUND   Please view full ultrasound note on Imaging tab in ViewPoint.  Dichorionic/diamniotic twin gestation.      A: Cephalic presentation.    Anterior placenta.   MVP 4.4 cm, which is normal.   Follow up spine views appear normal.   UA dopplers normal forward flow without absent or reversed flow.    BPP 8/8    B: Transverse presentation.  Posterior placenta  MVP 4.5 cm, which is normal.   UA dopplers normal forward flow without absent or reversed flow.    BPP 8/8    ASSESSMENT/COUNSELIN y.o. G 3 P 0020 at 26  6/7 weeks gestation (Estimated Date of Delivery: 24).    -Pregnancy  [ X ] stable  [   ] improving [  ] worsening    Diagnoses and all orders for this visit:    1. Dichorionic diamniotic twin pregnancy, antepartum (Primary)    2. Hypertension, unspecified type    Other orders  -     NIFEdipine XL (Procardia XL) 60 MG 24 hr tablet; Take 1 tablet by mouth Daily.  Dispense: 90 tablet; Refill: 10        IVF Pregnancy  Previously counseled.  ECHO normal.  Serial growth exams are indicated.      Dichorionic twin gestation  Previously counseled.      FGR baby B, normal dopplers.    She declined cell free DNA testing.     I think the most likely cause of " this is placental insufficiency.  Today we have normal umbilical artery dopplers which is reassuring.  We discussed umbilical artery doppler studies as 4 possible levels: normal, elevated, absent, and reversed.  We discussed that absent and reversed would require admission,  corticosteroids, and possibly delivery depending on gestational age and other testing.  She has FGR of baby B today so we will start weekly dopplers and BPP's.      CHTN  Procardia 60 mg daily.  She denies swelling, HA, vision changes, etc.    Acceptable blood pressures in pregnancies with chronic hypertension and without renal damage are 120-140/70-90s. Newer data from the [facility] (2022, CHAP TRIAL), supports more aggressive bp treatment to below 140/90 (previously ,160 in CHTN) and that this does not impair fetal growth or well-being but reduces risks of pre-eclampsia,  birth, and other pregnancy morbidity.  I discussed this with the patient. She has been logging her pressures.  We will get labs today and increase BP meds.  If labs abnormal may need to come in for obs, but will continue to monitor.     Discussed delivery timing.  At this point 36-37 weeks is appropriate given twins, CHTN, FGR.      Summary of Plan  -Growths every 4 weeks  -Continue to monitor BP at home.  -Recommend 2 baby ASA  -Increased procardia 60 in AM.   -Delivery 36-37 weeks.      Follow-up: weekly    Thank you for the consult and opportunity to care for this patient.  Please feel free to reach out with any questions or concerns.      I spent 15 minutes caring for this patient on this date of service. This time includes time spent by me in the following activities: preparing for the visit, reviewing tests, obtaining and/or reviewing a separately obtained history, performing a medically appropriate examination and/or evaluation, counseling and educating the patient/family/caregiver and independently interpreting results and communicating that  information with the patient/family/caregiver with greater than 50% spent in counseling and coordination of care.     4 minutes reading US.     Tamika Humphreys MD [facility]  Maternal Fetal Medicine-[facility]  Office: [phone]  [email or web address]

## 2024-04-18 NOTE — PROGRESS NOTES
Pt reports that she is doing well and denies vaginal bleeding, cramping, contractions or LOF at this time. Reports active fetal movement. Reviewed when to call OB office or present to L&D for evaluation with symptoms such as decreased fetal movement, vaginal bleeding, LOF or ctxs. Pt verbalized understanding. Denies HA, visual changes or epigastric pain. Denies any additional complaints at time of appointment. Next OB appointment scheduled for 04/22/2024    Vitals:    04/18/24 0923   BP: 148/96   Pulse:    Temp:

## 2024-04-18 NOTE — PROGRESS NOTES
MATERNAL FETAL MEDICINE Consult Note    Dear Dr Tanja Jeter*:    Thank you for your kind referral of Paola Neves.  As you know, she is a 34 y.o. G 3 P 0020 at 26  6/7 weeks gestation (Estimated Date of Delivery: 7/19/24). This is a consult.     Her antepartum course is complicated by:  IVF  Di/Di Twins  CHTN    Aneuploidy Screening: declined    HPI: Today, she denies headache, blurry vision, RUQ pain. No vaginal bleeding, no contractions.     Review of History:  Past Medical History:   Diagnosis Date    Abnormal Pap smear of cervix     6/2017 LGSIL/CIERA 1, 11/2017 normal    Anxiety and depression 3/5/2024    Asthma May 2007    Female infertility     Hypertension May 2016    Ovarian cyst      Past Surgical History:   Procedure Laterality Date    FERTILITY SURGERY  05/02/2022    IVF    WISDOM TOOTH EXTRACTION  04/2017     Social History     Socioeconomic History    Marital status:     Number of children: 0    Years of education: assoc degress   Tobacco Use    Smoking status: Never    Smokeless tobacco: Never   Vaping Use    Vaping status: Never Used   Substance and Sexual Activity    Alcohol use: Not Currently     Alcohol/week: 1.0 standard drink of alcohol     Types: 1 Glasses of wine per week     Comment: 1 a month    Drug use: No    Sexual activity: Yes     Partners: Male     Birth control/protection: None     Family History   Problem Relation Age of Onset    Thyroid disease Mother         Graves    Ulcerative colitis Mother     Hypertension Father     Hearing loss Father     Cancer Maternal Grandmother     Asthma Paternal Grandmother     Hearing loss Paternal Grandmother     Heart disease Paternal Grandfather     Diabetes Paternal Grandfather     Cancer Paternal Grandfather     No Known Problems Sister     Ulcerative colitis Brother     Breast cancer Neg Hx     Ovarian cancer Neg Hx     Uterine cancer Neg Hx     Colon cancer Neg Hx     Pulmonary embolism Neg Hx     Deep vein thrombosis Neg  "Hx       Allergies   Allergen Reactions    Penicillins Hives     Broke out in a rash in kindergarden    Sulfa Antibiotics Hives     Hives in college age      Current Outpatient Medications on File Prior to Visit   Medication Sig Dispense Refill    albuterol sulfate  (90 Base) MCG/ACT inhaler Inhale 2 puffs Every 4 (Four) Hours As Needed for Wheezing. 18 g 1    Beclomethasone Diprop HFA (Qvar RediHaler) 40 MCG/ACT inhaler Inhale 2 puffs 2 (Two) Times a Day. 10.6 g 5    cetirizine (ZyrTEC) 10 MG tablet Take 1 tablet by mouth Daily.      famotidine (Pepcid) 20 MG tablet Take 1 tablet by mouth 2 (Two) Times a Day. 60 tablet 3    NIFEdipine XL (Procardia XL) 30 MG 24 hr tablet Take 1 tablet by mouth Daily. 30 tablet 7    ondansetron ODT (ZOFRAN-ODT) 4 MG disintegrating tablet Place 1 tablet on the tongue Every 8 (Eight) Hours As Needed for Nausea or Vomiting. 30 tablet 2    sertraline (ZOLOFT) 25 MG tablet TAKE 1 TABLET BY MOUTH DAILY 90 tablet 1     No current facility-administered medications on file prior to visit.        Past obstetric, gynecological, medical, surgical, family and social history reviewed.  Relevant lab work and imaging reviewed.    Review of systems  Constitutional:  denies fever, chills, malaise.   ENT/Mouth:  denies sore throat, tinnitus  Eyes: denies vision changes/pain  CV:  denies chest pain  Respiratory:  denies cough/SOB  GI:  denies N/V, diarrhea, abdominal pain.    :   denies dysuria  Skin:  denies lesions or pruritus   Neuro:  denies weakness, focal neurologic symptoms    Vitals:    04/18/24 0700 04/18/24 0815 04/18/24 0921 04/18/24 0923   BP: (!) 165/103 148/98 144/100 148/96   BP Location: Right arm Left arm Right arm Left arm   Patient Position: Sitting Sitting Sitting Sitting   Pulse: 111      Temp: 99.6 °F (37.6 °C)      TempSrc: Temporal      Weight: 131 kg (289 lb)      Height: 167.6 cm (66\")          PHYSICAL EXAM   GENERAL: Not in acute distress, AAOx3, pleasant  CARDIO: " regular rate and rhythm  PULM: symmetric chest rise, speaking in complete sentences without difficulty  NEURO: awake, alert and oriented to person, place, and time  ABDOMINAL: No fundal tenderness, no rebound or guarding, gravid  EXTREMITIES: no bilateral lower extremity edema/tenderness  SKIN: Warm, well-perfused      ULTRASOUND   Please view full ultrasound note on Imaging tab in ViewPoint.  Dichorionic/diamniotic twin gestation.      A: Cephalic presentation.    Anterior placenta.   MVP 4.4 cm, which is normal.   Follow up spine views appear normal.   UA dopplers normal forward flow without absent or reversed flow.    BPP 8/8    B: Transverse presentation.  Posterior placenta  MVP 4.5 cm, which is normal.   UA dopplers normal forward flow without absent or reversed flow.    BPP 8/8    ASSESSMENT/COUNSELIN y.o. G 3 P 0020 at 26  6/7 weeks gestation (Estimated Date of Delivery: 24).    -Pregnancy  [ X ] stable  [   ] improving [  ] worsening    Diagnoses and all orders for this visit:    1. Dichorionic diamniotic twin pregnancy, antepartum (Primary)    2. Hypertension, unspecified type    Other orders  -     NIFEdipine XL (Procardia XL) 60 MG 24 hr tablet; Take 1 tablet by mouth Daily.  Dispense: 90 tablet; Refill: 10        IVF Pregnancy  Previously counseled.  ECHO normal.  Serial growth exams are indicated.      Dichorionic twin gestation  Previously counseled.      FGR baby B, normal dopplers.    She declined cell free DNA testing.     I think the most likely cause of this is placental insufficiency.  Today we have normal umbilical artery dopplers which is reassuring.  We discussed umbilical artery doppler studies as 4 possible levels: normal, elevated, absent, and reversed.  We discussed that absent and reversed would require admission,  corticosteroids, and possibly delivery depending on gestational age and other testing.  She has FGR of baby B today so we will start weekly dopplers and  BPP's.      CHTN  Procardia 60 mg daily.  She denies swelling, HA, vision changes, etc.    Acceptable blood pressures in pregnancies with chronic hypertension and without renal damage are 120-140/70-90s. Newer data from the [facility] (2022, CHAP TRIAL), supports more aggressive bp treatment to below 140/90 (previously ,160 in CHTN) and that this does not impair fetal growth or well-being but reduces risks of pre-eclampsia,  birth, and other pregnancy morbidity.  I discussed this with the patient. She has been logging her pressures.  We will get labs today and increase BP meds.  If labs abnormal may need to come in for obs, but will continue to monitor.     Discussed delivery timing.  At this point 36-37 weeks is appropriate given twins, CHTN, FGR.      Summary of Plan  -Growths every 4 weeks  - Twice weekly ANFS split between MFM and Primary OB (BPP/Dopplers at MFM once per week & BPP or NST at Primary OB once per week)  -Continue to monitor BP at home.  -Recommend 2 baby ASA  -Increased procardia 60 in AM.   -Delivery 36-37 weeks.      Follow-up: weekly    Thank you for the consult and opportunity to care for this patient.  Please feel free to reach out with any questions or concerns.      I spent 15 minutes caring for this patient on this date of service. This time includes time spent by me in the following activities: preparing for the visit, reviewing tests, obtaining and/or reviewing a separately obtained history, performing a medically appropriate examination and/or evaluation, counseling and educating the patient/family/caregiver and independently interpreting results and communicating that information with the patient/family/caregiver with greater than 50% spent in counseling and coordination of care.     4 minutes reading US.     Tamika Humphreys MD FACOG  Maternal Fetal Medicine-[facility]  Office: [phone]  [email or web address]

## 2024-04-22 ENCOUNTER — ROUTINE PRENATAL (OUTPATIENT)
Dept: OBSTETRICS AND GYNECOLOGY | Facility: CLINIC | Age: 34
End: 2024-04-22
Payer: COMMERCIAL

## 2024-04-22 VITALS — WEIGHT: 293 LBS | SYSTOLIC BLOOD PRESSURE: 158 MMHG | DIASTOLIC BLOOD PRESSURE: 102 MMHG | BODY MASS INDEX: 47.29 KG/M2

## 2024-04-22 DIAGNOSIS — F41.9 ANXIETY AND DEPRESSION: ICD-10-CM

## 2024-04-22 DIAGNOSIS — O99.210 OBESITY AFFECTING PREGNANCY, ANTEPARTUM, UNSPECIFIED OBESITY TYPE: ICD-10-CM

## 2024-04-22 DIAGNOSIS — Z34.92 PRENATAL CARE IN SECOND TRIMESTER: Primary | ICD-10-CM

## 2024-04-22 DIAGNOSIS — Z28.39 RUBELLA NON-IMMUNE STATUS, ANTEPARTUM: ICD-10-CM

## 2024-04-22 DIAGNOSIS — F32.A ANXIETY AND DEPRESSION: ICD-10-CM

## 2024-04-22 DIAGNOSIS — O09.899 RUBELLA NON-IMMUNE STATUS, ANTEPARTUM: ICD-10-CM

## 2024-04-22 DIAGNOSIS — I10 PRIMARY HYPERTENSION: ICD-10-CM

## 2024-04-22 DIAGNOSIS — O30.049 DICHORIONIC DIAMNIOTIC TWIN PREGNANCY, ANTEPARTUM: ICD-10-CM

## 2024-04-22 DIAGNOSIS — O36.5922 IUGR (INTRAUTERINE GROWTH RESTRICTION) AFFECTING CARE OF MOTHER, SECOND TRIMESTER, FETUS 2: ICD-10-CM

## 2024-04-22 DIAGNOSIS — O09.819 PREGNANCY RESULTING FROM IN VITRO FERTILIZATION, ANTEPARTUM: ICD-10-CM

## 2024-04-22 DIAGNOSIS — Z36.9 ENCOUNTER FOR ANTENATAL SCREENING, UNSPECIFIED: ICD-10-CM

## 2024-04-22 PROBLEM — O21.9 NAUSEA AND VOMITING DURING PREGNANCY PRIOR TO 22 WEEKS GESTATION: Status: RESOLVED | Noted: 2024-01-30 | Resolved: 2024-04-22

## 2024-04-22 LAB
BILIRUB BLD-MCNC: NEGATIVE MG/DL
CLARITY, POC: CLEAR
COLOR UR: YELLOW
GLUCOSE UR STRIP-MCNC: NEGATIVE MG/DL
KETONES UR QL: NEGATIVE
LEUKOCYTE EST, POC: NEGATIVE
NITRITE UR-MCNC: NEGATIVE MG/ML
PH UR: 6.5 [PH] (ref 5–8)
PROT UR STRIP-MCNC: NEGATIVE MG/DL
RBC # UR STRIP: NEGATIVE /UL
SP GR UR: 1 (ref 1–1.03)
UROBILINOGEN UR QL: NORMAL

## 2024-04-22 PROCEDURE — 0502F SUBSEQUENT PRENATAL CARE: CPT | Performed by: NURSE PRACTITIONER

## 2024-04-22 NOTE — PROGRESS NOTES
OB follow up > 20 weeks    Chief Complaint   Patient presents with    Routine Prenatal Visit       Paola Neves is a 34 y.o.  27w3d being seen today for her obstetrical visit.  This patient is new to me - no.  Patient reports no complaints. Taking prenatal vitamins: Yes and Procardia 60 mg daily with baby asa x 2.  Ran out of BP med over the weekend; asymptomatic.  Sees MFM weekly.      Review of Systems  Genitourinary: Negative for contractions, cramping, vaginal bleeding, or SROM.   Fetal movement: normal  Allergies   Allergen Reactions    Penicillins Hives     Broke out in a rash in kindergarden    Sulfa Antibiotics Hives     Hives in college age        BP (!) 158/102   Wt 133 kg (293 lb)   BMI 47.29 kg/m²     FHT:  Baby A-138 BPM; Baby B- 153 BPM   Uterine Size: twins       Assessment     1) pregnancy at 27w3d-  di-di twins.     3) Declines AFP/NIPT.      4) IVF- 2 donated embryos- Fetal echo normal. Declines carrier screening.      5) CHTN- on  mg and Procardia XL 60 mg in am; BP elevated today but ran out of med over the weekend; asymptomatic- scheduled to see MFM on Wednesday    Summary of Plan  -Growths every 4 weeks  - Twice weekly ANFS split between MFM and Primary OB (BPP/Dopplers at [facility] once per week & BPP or NST at Primary OB once per week)  -Continue to monitor BP at home.  -Recommend 2 baby ASA  -Increased procardia 60 in AM.   -Delivery 36-37 weeks.       2024- Pr:Cr=79.2, CBC,CMP, LDH, uric acid WNL; 24 hr urine=14     6) RNI- needs MMR pp . Avoid those with fevers and rashes.      7) Anxiety and depression- doing well on Zoloft 25 mg     8) Asthma- mild. Has inhaler at home for prn use.      9) BMI 45- will need growth US q 4 weeks  CHTN    10) heartburn- taking TUMS and Pepcid BID; improved    11) FGR- Baby B- normal umbilical artery doppler at [facility] on 24; monitor growth every 4 weeks; weekly dopplers and BPP's; BPP today     12) Disc that all pregnant women  should get a Tdap shot in the third trimester, preferably between 27 weeks and 36 weeks of pregnancy. The Tdap shot is an effective and safe way to protect the baby from serious illness and complications of pertussis. Recommend that partners, family members, and infant caregivers should be up to date on theTdap vaccine if they have not previously been vaccinated. Ideally, all family members should be vaccinated at least 2 weeks before coming in contact with the . If not administered during pregnancy, the Tdap vaccine should be given immediately postpartum if the patient is not UTD on Tdap.         Plan    Continue prenatal vitamins  Reviewed this stage of pregnancy  Problem list updated   Follow up in 1 week for OBT for 2 hr GTT/HH, Tdap; sees MFM weekly    GANGA Valentine  2024  11:53 EDT

## 2024-04-24 ENCOUNTER — HOSPITAL ENCOUNTER (OUTPATIENT)
Dept: ULTRASOUND IMAGING | Facility: HOSPITAL | Age: 34
Discharge: HOME | End: 2024-04-24
Admitting: NURSE PRACTITIONER
Payer: COMMERCIAL

## 2024-04-24 ENCOUNTER — OFFICE VISIT (OUTPATIENT)
Dept: OBSTETRICS AND GYNECOLOGY | Facility: CLINIC | Age: 34
End: 2024-04-24
Payer: COMMERCIAL

## 2024-04-24 VITALS
WEIGHT: 290 LBS | HEART RATE: 99 BPM | HEIGHT: 66 IN | DIASTOLIC BLOOD PRESSURE: 98 MMHG | SYSTOLIC BLOOD PRESSURE: 146 MMHG | BODY MASS INDEX: 46.61 KG/M2 | TEMPERATURE: 98.6 F

## 2024-04-24 DIAGNOSIS — O36.5922 IUGR (INTRAUTERINE GROWTH RESTRICTION) AFFECTING CARE OF MOTHER, SECOND TRIMESTER, FETUS 2: ICD-10-CM

## 2024-04-24 DIAGNOSIS — I10 HYPERTENSION, UNSPECIFIED TYPE: ICD-10-CM

## 2024-04-24 DIAGNOSIS — O30.043 DICHORIONIC DIAMNIOTIC TWIN PREGNANCY IN THIRD TRIMESTER: Primary | ICD-10-CM

## 2024-04-24 PROCEDURE — 76816 OB US FOLLOW-UP PER FETUS: CPT

## 2024-04-24 PROCEDURE — 76820 UMBILICAL ARTERY ECHO: CPT

## 2024-04-24 PROCEDURE — 76819 FETAL BIOPHYS PROFIL W/O NST: CPT

## 2024-04-24 NOTE — PROGRESS NOTES
MATERNAL FETAL MEDICINE Consult Note    Dear Dr Tanja Jeter*:    Thank you for your kind referral of Paola Neves.  As you know, she is a 34 y.o. G 3 P 0020 at 27  6/7 weeks gestation (Estimated Date of Delivery: 7/19/24). This is a consult.     Her antepartum course is complicated by:  IVF  Di/Di Twins  CHTN  FGR baby B    Aneuploidy Screening: declined    HPI: Today, she denies headache, blurry vision, RUQ pain. No vaginal bleeding, no contractions.     Review of History:  Past Medical History:   Diagnosis Date    Abnormal Pap smear of cervix     6/2017 LGSIL/CIERA 1, 11/2017 normal    Anxiety and depression 3/5/2024    Asthma May 2007    Female infertility     Hypertension May 2016    Ovarian cyst      Past Surgical History:   Procedure Laterality Date    FERTILITY SURGERY  05/02/2022    IVF    WISDOM TOOTH EXTRACTION  04/2017     Social History     Socioeconomic History    Marital status:     Number of children: 0    Years of education: assoc degress   Tobacco Use    Smoking status: Never    Smokeless tobacco: Never   Vaping Use    Vaping status: Never Used   Substance and Sexual Activity    Alcohol use: Not Currently     Alcohol/week: 1.0 standard drink of alcohol     Types: 1 Glasses of wine per week     Comment: 1 a month    Drug use: No    Sexual activity: Yes     Partners: Male     Birth control/protection: None     Family History   Problem Relation Age of Onset    Thyroid disease Mother         Graves    Ulcerative colitis Mother     Hypertension Father     Hearing loss Father     Cancer Maternal Grandmother     Asthma Paternal Grandmother     Hearing loss Paternal Grandmother     Heart disease Paternal Grandfather     Diabetes Paternal Grandfather     Cancer Paternal Grandfather     No Known Problems Sister     Ulcerative colitis Brother     Breast cancer Neg Hx     Ovarian cancer Neg Hx     Uterine cancer Neg Hx     Colon cancer Neg Hx     Pulmonary embolism Neg Hx     Deep vein  "thrombosis Neg Hx       Allergies   Allergen Reactions    Penicillins Hives     Broke out in a rash in kindergarden    Sulfa Antibiotics Hives     Hives in college age      Current Outpatient Medications on File Prior to Visit   Medication Sig Dispense Refill    albuterol sulfate  (90 Base) MCG/ACT inhaler Inhale 2 puffs Every 4 (Four) Hours As Needed for Wheezing. 18 g 1    Beclomethasone Diprop HFA (Qvar RediHaler) 40 MCG/ACT inhaler Inhale 2 puffs 2 (Two) Times a Day. 10.6 g 5    cetirizine (ZyrTEC) 10 MG tablet Take 1 tablet by mouth Daily.      famotidine (Pepcid) 20 MG tablet Take 1 tablet by mouth 2 (Two) Times a Day. 60 tablet 3    NIFEdipine XL (Procardia XL) 60 MG 24 hr tablet Take 1 tablet by mouth Daily. 90 tablet 10    ondansetron ODT (ZOFRAN-ODT) 4 MG disintegrating tablet Place 1 tablet on the tongue Every 8 (Eight) Hours As Needed for Nausea or Vomiting. 30 tablet 2    sertraline (ZOLOFT) 25 MG tablet TAKE 1 TABLET BY MOUTH DAILY 90 tablet 1    NIFEdipine XL (Procardia XL) 30 MG 24 hr tablet Take 1 tablet by mouth Daily. 30 tablet 7     No current facility-administered medications on file prior to visit.        Past obstetric, gynecological, medical, surgical, family and social history reviewed.  Relevant lab work and imaging reviewed.    Review of systems  Constitutional:  denies fever, chills, malaise.   ENT/Mouth:  denies sore throat, tinnitus  Eyes: denies vision changes/pain  CV:  denies chest pain  Respiratory:  denies cough/SOB  GI:  denies N/V, diarrhea, abdominal pain.    :   denies dysuria  Skin:  denies lesions or pruritus   Neuro:  denies weakness, focal neurologic symptoms    Vitals:    04/24/24 0900 04/24/24 1101 04/24/24 1233   BP: (!) 167/104 140/100 146/98   BP Location: Right arm Right arm Right arm   Patient Position: Sitting Sitting Sitting   Pulse: 99     Temp: 98.6 °F (37 °C)     TempSrc: Temporal     Weight: 132 kg (290 lb)     Height: 167.6 cm (66\")         PHYSICAL " EXAM   GENERAL: Not in acute distress, AAOx3, pleasant  CARDIO: regular rate and rhythm  PULM: symmetric chest rise, speaking in complete sentences without difficulty  NEURO: awake, alert and oriented to person, place, and time  ABDOMINAL: No fundal tenderness, no rebound or guarding, gravid  EXTREMITIES: no bilateral lower extremity edema/tenderness  SKIN: Warm, well-perfused      ULTRASOUND   Please view full ultrasound note on Imaging tab in ViewPoint.  Dichorionic/diamniotic twin gestation.      A: Breech presentation.    Anterior placenta.   MVP 7 cm, which is normal.   EFW 1016 g (39%, AC 32%)  BPP 8/8    B: Transverse presentation.  Posterior placenta  MVP 6.3 cm, which is normal.    g (25 % AC 4%)  UA dopplers normal forward flow (1/7 elevated) without absent or reversed flow.    BPP 8/8  Discordance 13%, which is normal.      ASSESSMENT/COUNSELIN y.o. G 3 P 0020 at 27  6/7 weeks gestation (Estimated Date of Delivery: 24).     -Pregnancy  [ X ] stable  [   ] improving [  ] worsening    Diagnoses and all orders for this visit:    1. Dichorionic diamniotic twin pregnancy in third trimester (Primary)    2. IUGR (intrauterine growth restriction) affecting care of mother, second trimester, fetus 2- twin B FGR  Overview:  weekly dopplers and BPP's       3. Hypertension, unspecified type  Overview:  Summary of Plan (24)  -Growths every 4 weeks  - Twice weekly ANFS split between MFM and Primary OB (BPP/Dopplers at MFM once per week & BPP or NST at Primary OB once per week)  -Continue to monitor BP at home.  -Recommend 2 baby ASA  -Increased procardia 60 in AM.   -Delivery 36-37 weeks.               IVF Pregnancy  Previously counseled.  ECHO normal.  Serial growth exams are indicated.      Dichorionic twin gestation  Previously counseled.      FGR baby B, normal dopplers.    She declined cell free DNA testing.     I think the most likely cause of this is placental insufficiency.  Today we have  normal umbilical artery dopplers which is reassuring.  We discussed umbilical artery doppler studies as 4 possible levels: normal, elevated, absent, and reversed.  We discussed that absent and reversed would require admission,  corticosteroids, and possibly delivery depending on gestational age and other testing.  We are doing weekly Dopplers/BPPs.  She should have an NST added on  in [facility]  Procardia 60 mg daily.  She denies swelling, HA, vision changes, etc.    Acceptable blood pressures in pregnancies with chronic hypertension and without renal damage are 120-140/70-90s. Newer data from the [facility] (2022, CHAP TRIAL), supports more aggressive bp treatment to below 140/90 (previously ,160 in [place]) and that this does not impair fetal growth or well-being but reduces risks of pre-eclampsia,  birth, and other pregnancy morbidity.  I discussed this with the patient. She has been logging her pressures.  They are high normal at home, but more elevated here.  No symptoms and no severe range.      Discussed delivery timing.  At this point 36-37 weeks is appropriate given twins, CHTN, FGR.      Summary of Plan  -Growths every 4 weeks  - Twice weekly ANFS split between MFM and Primary OB (BPP/Dopplers at MFM once per week & BPP or NST at Primary OB once per week)  -Continue to monitor BP at home.  -Recommend 2 baby ASA  -Increased procardia 60 in AM.   -Delivery 36-37 weeks.      Follow-up: weekly    Thank you for the consult and opportunity to care for this patient.  Please feel free to reach out with any questions or concerns.      I spent 13 minutes caring for this patient on this date of service. This time includes time spent by me in the following activities: preparing for the visit, reviewing tests, obtaining and/or reviewing a separately obtained history, performing a medically appropriate examination and/or evaluation, counseling and educating the patient/family/caregiver and  independently interpreting results and communicating that information with the patient/family/caregiver with greater than 50% spent in counseling and coordination of care.     4 minutes reading US.     Tamika Humphreys MD [facility] [place]  [facility]  Office: [phone]  [email or web address]

## 2024-04-24 NOTE — PROGRESS NOTES
Pt reports that she is doing well and denies vaginal bleeding, cramping, contractions or LOF at this time. Reports active fetal movement. Reviewed when to call OB office or present to L&D for evaluation with symptoms such as decreased fetal movement, vaginal bleeding, LOF or ctxs. Pt verbalized understanding. Denies HA, visual changes or epigastric pain. Denies any additional complaints at time of appointment. Next OB appointment scheduled for 04/30/2024.    Vitals:    04/24/24 1101   BP: 140/100   Pulse:    Temp:

## 2024-04-24 NOTE — LETTER
April 24, 2024     Tanja Jeter MD  [address]  [address]  [place] [address]    Patient: Paola Neves   YOB: 1990   Date of Visit: 4/24/2024       Dear Tanja Jeter MD    Paola Neves was in my office today. Below is a copy of my note.    If you have questions, please do not hesitate to call me. I look forward to following Paola along with you.         Sincerely,        Tamika Humphreys MD    MATERNAL FETAL MEDICINE Consult Note    Dear Dr Tanja Jeter*:    Thank you for your kind referral of Paola Neves.  As you know, she is a 34 y.o. G 3 P 0020 at 27  6/7 weeks gestation (Estimated Date of Delivery: 7/19/24). This is a consult.     Her antepartum course is complicated by:  IVF  Di/Di Twins  CHTN  FGR baby B    Aneuploidy Screening: declined    HPI: Today, she denies headache, blurry vision, RUQ pain. No vaginal bleeding, no contractions.     Review of History:  Past Medical History:   Diagnosis Date   • Abnormal Pap smear of cervix     6/2017 LGSIL/CIERA 1, 11/2017 normal   • Anxiety and depression 3/5/2024   • Asthma May 2007   • Female infertility    • Hypertension May 2016   • Ovarian cyst      Past Surgical History:   Procedure Laterality Date   • FERTILITY SURGERY  05/02/2022    IVF   • WISDOM TOOTH EXTRACTION  04/2017     Social History     Socioeconomic History   • Marital status:    • Number of children: 0   • Years of education: assoc degress   Tobacco Use   • Smoking status: Never   • Smokeless tobacco: Never   Vaping Use   • Vaping status: Never Used   Substance and Sexual Activity   • Alcohol use: Not Currently     Alcohol/week: 1.0 standard drink of alcohol     Types: 1 Glasses of wine per week     Comment: 1 a month   • Drug use: No   • Sexual activity: Yes     Partners: Male     Birth control/protection: None     Family History   Problem Relation Age of Onset   • Thyroid disease Mother         Graves   • Ulcerative colitis  Mother    • Hypertension Father    • Hearing loss Father    • Cancer Maternal Grandmother    • Asthma Paternal Grandmother    • Hearing loss Paternal Grandmother    • Heart disease Paternal Grandfather    • Diabetes Paternal Grandfather    • Cancer Paternal Grandfather    • No Known Problems Sister    • Ulcerative colitis Brother    • Breast cancer Neg Hx    • Ovarian cancer Neg Hx    • Uterine cancer Neg Hx    • Colon cancer Neg Hx    • Pulmonary embolism Neg Hx    • Deep vein thrombosis Neg Hx       Allergies   Allergen Reactions   • Penicillins Hives     Broke out in a rash in kindergarden   • Sulfa Antibiotics Hives     Hives in college age      Current Outpatient Medications on File Prior to Visit   Medication Sig Dispense Refill   • albuterol sulfate  (90 Base) MCG/ACT inhaler Inhale 2 puffs Every 4 (Four) Hours As Needed for Wheezing. 18 g 1   • Beclomethasone Diprop HFA (Qvar RediHaler) 40 MCG/ACT inhaler Inhale 2 puffs 2 (Two) Times a Day. 10.6 g 5   • cetirizine (ZyrTEC) 10 MG tablet Take 1 tablet by mouth Daily.     • famotidine (Pepcid) 20 MG tablet Take 1 tablet by mouth 2 (Two) Times a Day. 60 tablet 3   • NIFEdipine XL (Procardia XL) 60 MG 24 hr tablet Take 1 tablet by mouth Daily. 90 tablet 10   • ondansetron ODT (ZOFRAN-ODT) 4 MG disintegrating tablet Place 1 tablet on the tongue Every 8 (Eight) Hours As Needed for Nausea or Vomiting. 30 tablet 2   • sertraline (ZOLOFT) 25 MG tablet TAKE 1 TABLET BY MOUTH DAILY 90 tablet 1   • NIFEdipine XL (Procardia XL) 30 MG 24 hr tablet Take 1 tablet by mouth Daily. 30 tablet 7     No current facility-administered medications on file prior to visit.        Past obstetric, gynecological, medical, surgical, family and social history reviewed.  Relevant lab work and imaging reviewed.    Review of systems  Constitutional:  denies fever, chills, malaise.   ENT/Mouth:  denies sore throat, tinnitus  Eyes: denies vision changes/pain  CV:  denies chest  "pain  Respiratory:  denies cough/SOB  GI:  denies N/V, diarrhea, abdominal pain.    :   denies dysuria  Skin:  denies lesions or pruritus   Neuro:  denies weakness, focal neurologic symptoms    Vitals:    24 0900 24 1101 24 1233   BP: (!) 167/104 140/100 146/98   BP Location: Right arm Right arm Right arm   Patient Position: Sitting Sitting Sitting   Pulse: 99     Temp: 98.6 °F (37 °C)     TempSrc: Temporal     Weight: 132 kg (290 lb)     Height: 167.6 cm (66\")         PHYSICAL EXAM   GENERAL: Not in acute distress, AAOx3, pleasant  CARDIO: regular rate and rhythm  PULM: symmetric chest rise, speaking in complete sentences without difficulty  NEURO: awake, alert and oriented to person, place, and time  ABDOMINAL: No fundal tenderness, no rebound or guarding, gravid  EXTREMITIES: no bilateral lower extremity edema/tenderness  SKIN: Warm, well-perfused      ULTRASOUND   Please view full ultrasound note on Imaging tab in ViewPoint.  Dichorionic/diamniotic twin gestation.      A: Breech presentation.    Anterior placenta.   MVP 7 cm, which is normal.   EFW 1016 g (39%, AC 32%)  BPP 8/8    B: Transverse presentation.  Posterior placenta  MVP 6.3 cm, which is normal.    g (25 % AC 4%)  UA dopplers normal forward flow (1/7 elevated) without absent or reversed flow.    BPP 8/8  Discordance 13%, which is normal.      ASSESSMENT/COUNSELIN y.o. G 3 P 0020 at 27  6/7 weeks gestation (Estimated Date of Delivery: 24).     -Pregnancy  [ X ] stable  [   ] improving [  ] worsening    Diagnoses and all orders for this visit:    1. Dichorionic diamniotic twin pregnancy in third trimester (Primary)    2. IUGR (intrauterine growth restriction) affecting care of mother, second trimester, fetus 2- twin B FGR  Overview:  weekly dopplers and BPP's       3. Hypertension, unspecified type  Overview:  Summary of Plan (24)  -Growths every 4 weeks  - Twice weekly ANFS split between MFM and Primary OB " (BPP/Dopplers at MFM once per week & BPP or NST at Primary OB once per week)  -Continue to monitor BP at home.  -Recommend 2 baby ASA  -Increased procardia 60 in AM.   -Delivery 36-37 weeks.               IVF Pregnancy  Previously counseled.  ECHO normal.  Serial growth exams are indicated.      Dichorionic twin gestation  Previously counseled.      FGR baby B, normal dopplers.    She declined cell free DNA testing.     I think the most likely cause of this is placental insufficiency.  Today we have normal umbilical artery dopplers which is reassuring.  We discussed umbilical artery doppler studies as 4 possible levels: normal, elevated, absent, and reversed.  We discussed that absent and reversed would require admission,  corticosteroids, and possibly delivery depending on gestational age and other testing.  We are doing weekly Dopplers/BPPs.  She should have an NST added on  in [facility]  Procardia 60 mg daily.  She denies swelling, HA, vision changes, etc.    Acceptable blood pressures in pregnancies with chronic hypertension and without renal damage are 120-140/70-90s. Newer data from the [facility] (2022, CHAP TRIAL), supports more aggressive bp treatment to below 140/90 (previously ,160 in CHTN) and that this does not impair fetal growth or well-being but reduces risks of pre-eclampsia,  birth, and other pregnancy morbidity.  I discussed this with the patient. She has been logging her pressures.  They are high normal at home, but more elevated here.  No symptoms and no severe range.      Discussed delivery timing.  At this point 36-37 weeks is appropriate given twins, CHTN, FGR.      Summary of Plan  -Growths every 4 weeks  - Twice weekly ANFS split between MFM and Primary OB (BPP/Dopplers at MFM once per week & BPP or NST at Primary OB once per week)  -Continue to monitor BP at home.  -Recommend 2 baby ASA  -Increased procardia 60 in AM.   -Delivery 36-37 weeks.      Follow-up:  weekly    Thank you for the consult and opportunity to care for this patient.  Please feel free to reach out with any questions or concerns.      I spent 13 minutes caring for this patient on this date of service. This time includes time spent by me in the following activities: preparing for the visit, reviewing tests, obtaining and/or reviewing a separately obtained history, performing a medically appropriate examination and/or evaluation, counseling and educating the patient/family/caregiver and independently interpreting results and communicating that information with the patient/family/caregiver with greater than 50% spent in counseling and coordination of care.     4 minutes reading US.     Tamika Humphreys MD FACOG  Maternal Fetal Medicine-[facility]  Office: [phone]  [email or web address]

## 2024-04-30 ENCOUNTER — ROUTINE PRENATAL (OUTPATIENT)
Dept: OBSTETRICS AND GYNECOLOGY | Facility: CLINIC | Age: 34
End: 2024-04-30
Payer: COMMERCIAL

## 2024-04-30 VITALS — DIASTOLIC BLOOD PRESSURE: 98 MMHG | SYSTOLIC BLOOD PRESSURE: 142 MMHG | WEIGHT: 290.8 LBS | BODY MASS INDEX: 46.94 KG/M2

## 2024-04-30 DIAGNOSIS — Z28.39 RUBELLA NON-IMMUNE STATUS, ANTEPARTUM: ICD-10-CM

## 2024-04-30 DIAGNOSIS — Z34.93 PRENATAL CARE IN THIRD TRIMESTER: Primary | ICD-10-CM

## 2024-04-30 DIAGNOSIS — O09.819 PREGNANCY RESULTING FROM IN VITRO FERTILIZATION, ANTEPARTUM: ICD-10-CM

## 2024-04-30 DIAGNOSIS — Z36.9 ENCOUNTER FOR ANTENATAL SCREENING, UNSPECIFIED: ICD-10-CM

## 2024-04-30 DIAGNOSIS — O30.049 DICHORIONIC DIAMNIOTIC TWIN PREGNANCY, ANTEPARTUM: ICD-10-CM

## 2024-04-30 DIAGNOSIS — E66.8 OTHER OBESITY AFFECTING PREGNANCY, ANTEPARTUM: ICD-10-CM

## 2024-04-30 DIAGNOSIS — Z23 NEED FOR TDAP VACCINATION: ICD-10-CM

## 2024-04-30 DIAGNOSIS — O09.899 RUBELLA NON-IMMUNE STATUS, ANTEPARTUM: ICD-10-CM

## 2024-04-30 DIAGNOSIS — I10 HYPERTENSION, UNSPECIFIED TYPE: ICD-10-CM

## 2024-04-30 DIAGNOSIS — F41.9 ANXIETY AND DEPRESSION: ICD-10-CM

## 2024-04-30 DIAGNOSIS — F32.A ANXIETY AND DEPRESSION: ICD-10-CM

## 2024-04-30 DIAGNOSIS — O99.210 OTHER OBESITY AFFECTING PREGNANCY, ANTEPARTUM: ICD-10-CM

## 2024-04-30 DIAGNOSIS — O36.5922 IUGR (INTRAUTERINE GROWTH RESTRICTION) AFFECTING CARE OF MOTHER, SECOND TRIMESTER, FETUS 2: ICD-10-CM

## 2024-04-30 PROCEDURE — 90715 TDAP VACCINE 7 YRS/> IM: CPT | Performed by: NURSE PRACTITIONER

## 2024-04-30 PROCEDURE — 90471 IMMUNIZATION ADMIN: CPT | Performed by: NURSE PRACTITIONER

## 2024-04-30 PROCEDURE — 0502F SUBSEQUENT PRENATAL CARE: CPT | Performed by: NURSE PRACTITIONER

## 2024-04-30 NOTE — PROGRESS NOTES
OB follow up > 20 weeks    Chief Complaint   Patient presents with    Routine Prenatal Visit       Paola Neves is a 34 y.o.  27w3d being seen today for her obstetrical visit.  This patient is new to me - no.  Patient reports  Ishaan Willson . Taking prenatal vitamins: Yes and Procardia 60 mg daily with baby asa x 2.  Forgot to take BP med this morning; asymptomatic.  Sees MFM weekly.      Review of Systems  Genitourinary: Negative for contractions, vaginal bleeding, or SROM.   Fetal movement: normal  Allergies   Allergen Reactions    Penicillins Hives     Broke out in a rash in kindergarden    Sulfa Antibiotics Hives     Hives in college age        /98   Wt 132 kg (290 lb 12.8 oz)   BMI 46.94 kg/m²     FHT:  Baby A-148 BPM; Baby B- 153 BPM   Uterine Size: twins       Assessment     1) pregnancy at 27w3d-  di-di twins. 2 hr GTT/RPR/HH today    3) Declines AFP/NIPT.      4) IVF- 2 donated embryos- Fetal echo normal. Declines carrier screening.      5) CHTN- on  mg and Procardia XL 60 mg in am; BP elevated today- forgot to take BP med this morning; asymptomatic- scheduled to see MFM tomorrow    Summary of Plan  -Growths every 4 weeks  - Twice weekly ANFS split between [facility] and Primary OB (BPP/Dopplers at [facility] once per week & BPP or NST at Primary OB once per week)  -Continue to monitor BP at home.  -Recommend 2 baby ASA  -Increased procardia 60 in AM.   -Delivery 36-37 weeks.       2024- Pr:Cr=79.2, CBC,CMP, LDH, uric acid WNL; 24 hr urine=14     6) RNI- needs MMR pp . Avoid those with fevers and rashes.      7) Anxiety and depression- doing well on Zoloft 25 mg     8) Asthma- mild. Has inhaler at home for prn use.      9) BMI 45- will need growth US q 4 weeks  CHTN    10) heartburn- taking TUMS and Pepcid BID; improved    11) FGR- Baby B- normal umbilical artery doppler at [facility] on 24; monitor growth every 4 weeks; weekly dopplers and BPP's; BPP today ; discordance 13% on MFM  US last week    12) Disc that all pregnant women should get a Tdap shot in the third trimester, preferably between 27 weeks and 36 weeks of pregnancy. The Tdap shot is an effective and safe way to protect the baby from serious illness and complications of pertussis. Recommend that partners, family members, and infant caregivers should be up to date on theTdap vaccine if they have not previously been vaccinated. Ideally, all family members should be vaccinated at least 2 weeks before coming in contact with the . If not administered during pregnancy, the Tdap vaccine should be given immediately postpartum if the patient is not UTD on Tdap.  Received Tdap today.        Plan    Continue prenatal vitamins  Reviewed this stage of pregnancy  Problem list updated   Follow up in 1 week for OBT, BPP; sees MFM weekly    Parts of this document have been copied or forwarded from her previous visits and have been reviewed, updated and edited as indicated.      Beckie Barros, APRN  2024  10:35 EDT

## 2024-05-01 ENCOUNTER — LAB (OUTPATIENT)
Dept: LAB | Facility: HOSPITAL | Age: 34
End: 2024-05-01
Payer: COMMERCIAL

## 2024-05-01 ENCOUNTER — HOSPITAL ENCOUNTER (OUTPATIENT)
Dept: ULTRASOUND IMAGING | Facility: HOSPITAL | Age: 34
Discharge: HOME | End: 2024-05-01
Payer: COMMERCIAL

## 2024-05-01 ENCOUNTER — OFFICE VISIT (OUTPATIENT)
Dept: OBSTETRICS AND GYNECOLOGY | Facility: CLINIC | Age: 34
End: 2024-05-01
Payer: COMMERCIAL

## 2024-05-01 VITALS
SYSTOLIC BLOOD PRESSURE: 146 MMHG | DIASTOLIC BLOOD PRESSURE: 98 MMHG | BODY MASS INDEX: 46.77 KG/M2 | HEIGHT: 66 IN | HEART RATE: 92 BPM | WEIGHT: 291 LBS | TEMPERATURE: 98.6 F

## 2024-05-01 DIAGNOSIS — O24.410 GDM (GESTATIONAL DIABETES MELLITUS), CLASS A1: Primary | ICD-10-CM

## 2024-05-01 DIAGNOSIS — O36.5922 IUGR (INTRAUTERINE GROWTH RESTRICTION) AFFECTING CARE OF MOTHER, SECOND TRIMESTER, FETUS 2: ICD-10-CM

## 2024-05-01 DIAGNOSIS — O10.919 CHRONIC HYPERTENSION AFFECTING PREGNANCY: Primary | ICD-10-CM

## 2024-05-01 DIAGNOSIS — O09.819 PREGNANCY RESULTING FROM IN VITRO FERTILIZATION, ANTEPARTUM: ICD-10-CM

## 2024-05-01 DIAGNOSIS — O30.043 DICHORIONIC DIAMNIOTIC TWIN PREGNANCY IN THIRD TRIMESTER: ICD-10-CM

## 2024-05-01 LAB
ALBUMIN SERPL-MCNC: 3.4 G/DL (ref 3.5–5.2)
ALBUMIN/GLOB SERPL: 1.1 G/DL
ALP SERPL-CCNC: 102 U/L (ref 39–117)
ALT SERPL W P-5'-P-CCNC: 10 U/L (ref 1–33)
ANION GAP SERPL CALCULATED.3IONS-SCNC: 8.2 MMOL/L (ref 5–15)
AST SERPL-CCNC: 16 U/L (ref 1–32)
BASOPHILS # BLD AUTO: 0.07 10*3/MM3 (ref 0–0.2)
BASOPHILS NFR BLD AUTO: 0.5 % (ref 0–1.5)
BILIRUB SERPL-MCNC: 0.2 MG/DL (ref 0–1.2)
BUN SERPL-MCNC: 8 MG/DL (ref 6–20)
BUN/CREAT SERPL: 13.6 (ref 7–25)
CALCIUM SPEC-SCNC: 8.9 MG/DL (ref 8.6–10.5)
CHLORIDE SERPL-SCNC: 106 MMOL/L (ref 98–107)
CO2 SERPL-SCNC: 22.8 MMOL/L (ref 22–29)
CREAT SERPL-MCNC: 0.59 MG/DL (ref 0.57–1)
CREAT UR-MCNC: 105.7 MG/DL
DEPRECATED RDW RBC AUTO: 43.7 FL (ref 37–54)
EGFRCR SERPLBLD CKD-EPI 2021: 121.5 ML/MIN/1.73
EOSINOPHIL # BLD AUTO: 0.33 10*3/MM3 (ref 0–0.4)
EOSINOPHIL NFR BLD AUTO: 2.3 % (ref 0.3–6.2)
ERYTHROCYTE [DISTWIDTH] IN BLOOD BY AUTOMATED COUNT: 13.3 % (ref 12.3–15.4)
GLOBULIN UR ELPH-MCNC: 3 GM/DL
GLUCOSE 1H P 75 G GLC PO SERPL-MCNC: 163 MG/DL (ref 70–179)
GLUCOSE 2H P 75 G GLC PO SERPL-MCNC: 170 MG/DL (ref 70–152)
GLUCOSE P FAST SERPL-MCNC: 81 MG/DL (ref 70–91)
GLUCOSE SERPL-MCNC: 96 MG/DL (ref 65–99)
HCT VFR BLD AUTO: 37.3 % (ref 34–46.6)
HCT VFR BLD AUTO: 38 % (ref 34–46.6)
HGB BLD-MCNC: 12.2 G/DL (ref 12–15.9)
HGB BLD-MCNC: 12.6 G/DL (ref 11.1–15.9)
IMM GRANULOCYTES # BLD AUTO: 0.11 10*3/MM3 (ref 0–0.05)
IMM GRANULOCYTES NFR BLD AUTO: 0.8 % (ref 0–0.5)
LDH SERPL-CCNC: 199 U/L (ref 135–214)
LYMPHOCYTES # BLD AUTO: 1.66 10*3/MM3 (ref 0.7–3.1)
LYMPHOCYTES NFR BLD AUTO: 11.7 % (ref 19.6–45.3)
MCH RBC QN AUTO: 29.1 PG (ref 26.6–33)
MCHC RBC AUTO-ENTMCNC: 32.7 G/DL (ref 31.5–35.7)
MCV RBC AUTO: 89 FL (ref 79–97)
MONOCYTES # BLD AUTO: 0.7 10*3/MM3 (ref 0.1–0.9)
MONOCYTES NFR BLD AUTO: 5 % (ref 5–12)
NEUTROPHILS NFR BLD AUTO: 11.26 10*3/MM3 (ref 1.7–7)
NEUTROPHILS NFR BLD AUTO: 79.7 % (ref 42.7–76)
NRBC BLD AUTO-RTO: 0 /100 WBC (ref 0–0.2)
PLATELET # BLD AUTO: 267 10*3/MM3 (ref 140–450)
PMV BLD AUTO: 10.5 FL (ref 6–12)
POTASSIUM SERPL-SCNC: 4.4 MMOL/L (ref 3.5–5.2)
PROT ?TM UR-MCNC: 12.8 MG/DL
PROT SERPL-MCNC: 6.4 G/DL (ref 6–8.5)
PROT/CREAT UR: 121.1 MG/G CREA (ref 0–200)
RBC # BLD AUTO: 4.19 10*6/MM3 (ref 3.77–5.28)
RPR SER QL: NON REACTIVE
SODIUM SERPL-SCNC: 137 MMOL/L (ref 136–145)
URATE SERPL-MCNC: 4.8 MG/DL (ref 2.4–5.7)
WBC NRBC COR # BLD AUTO: 14.13 10*3/MM3 (ref 3.4–10.8)

## 2024-05-01 PROCEDURE — 83615 LACTATE (LD) (LDH) ENZYME: CPT | Performed by: NURSE PRACTITIONER

## 2024-05-01 PROCEDURE — 85025 COMPLETE CBC W/AUTO DIFF WBC: CPT | Performed by: NURSE PRACTITIONER

## 2024-05-01 PROCEDURE — 84550 ASSAY OF BLOOD/URIC ACID: CPT | Performed by: NURSE PRACTITIONER

## 2024-05-01 PROCEDURE — 80053 COMPREHEN METABOLIC PANEL: CPT | Performed by: NURSE PRACTITIONER

## 2024-05-01 PROCEDURE — 36415 COLL VENOUS BLD VENIPUNCTURE: CPT | Performed by: NURSE PRACTITIONER

## 2024-05-01 PROCEDURE — 76819 FETAL BIOPHYS PROFIL W/O NST: CPT

## 2024-05-01 PROCEDURE — 84156 ASSAY OF PROTEIN URINE: CPT | Performed by: NURSE PRACTITIONER

## 2024-05-01 PROCEDURE — 82570 ASSAY OF URINE CREATININE: CPT | Performed by: NURSE PRACTITIONER

## 2024-05-01 PROCEDURE — 76820 UMBILICAL ARTERY ECHO: CPT

## 2024-05-01 RX ORDER — LANCETS 30 GAUGE
EACH MISCELLANEOUS
Qty: 120 EACH | Refills: 6 | Status: SHIPPED | OUTPATIENT
Start: 2024-05-01

## 2024-05-01 RX ORDER — BLOOD-GLUCOSE METER
KIT MISCELLANEOUS
Qty: 1 EACH | Refills: 1 | Status: SHIPPED | OUTPATIENT
Start: 2024-05-01

## 2024-05-01 NOTE — LETTER
May 1, 2024     Tanja Jeter MD  [address]  [address]  [place] [address]    Patient: Paola Neves   YOB: 1990   Date of Visit: 5/1/2024       Dear Tanja Jeter MD    Paola Neves was in my office today. Below is a copy of my note.    If you have questions, please do not hesitate to call me. I look forward to following Paola along with you.         Sincerely,        GANGA Nj        CC: No Recipients                          MATERNAL FETAL MEDICINE Consult Note    Dear Dr Tanja Jeter*:    Thank you for your kind referral of Paola Neves.  As you know, she is a 34 y.o. G 3 P 0020 at 28  5/7 weeks gestation (Estimated Date of Delivery: 7/19/24). This is a consult.     Her antepartum course is complicated by:  IVF  Di/Di Twins  CHTN  FGR baby B    Aneuploidy Screening: declined    HPI: Today, she denies headache, blurry vision, RUQ pain. No vaginal bleeding, no contractions.     Review of History:  Past Medical History:   Diagnosis Date   • Abnormal Pap smear of cervix     6/2017 LGSIL/CIERA 1, 11/2017 normal   • Anxiety and depression 3/5/2024   • Asthma May 2007   • Female infertility    • Hypertension May 2016   • Ovarian cyst      Past Surgical History:   Procedure Laterality Date   • FERTILITY SURGERY  05/02/2022    IVF   • WISDOM TOOTH EXTRACTION  04/2017     Social History     Socioeconomic History   • Marital status:    • Number of children: 0   • Years of education: assoc degress   Tobacco Use   • Smoking status: Never   • Smokeless tobacco: Never   Vaping Use   • Vaping status: Never Used   Substance and Sexual Activity   • Alcohol use: Not Currently     Alcohol/week: 1.0 standard drink of alcohol     Types: 1 Glasses of wine per week     Comment: 1 a month   • Drug use: No   • Sexual activity: Yes     Partners: Male     Birth control/protection: None     Family History   Problem Relation Age of Onset   • Thyroid  disease Mother         Graves   • Ulcerative colitis Mother    • Hypertension Father    • Hearing loss Father    • Cancer Maternal Grandmother    • Asthma Paternal Grandmother    • Hearing loss Paternal Grandmother    • Heart disease Paternal Grandfather    • Diabetes Paternal Grandfather    • Cancer Paternal Grandfather    • No Known Problems Sister    • Ulcerative colitis Brother    • Breast cancer Neg Hx    • Ovarian cancer Neg Hx    • Uterine cancer Neg Hx    • Colon cancer Neg Hx    • Pulmonary embolism Neg Hx    • Deep vein thrombosis Neg Hx       Allergies   Allergen Reactions   • Penicillins Hives     Broke out in a rash in kindergarden   • Sulfa Antibiotics Hives     Hives in college age      Current Outpatient Medications on File Prior to Visit   Medication Sig Dispense Refill   • albuterol sulfate  (90 Base) MCG/ACT inhaler Inhale 2 puffs Every 4 (Four) Hours As Needed for Wheezing. 18 g 1   • Beclomethasone Diprop HFA (Qvar RediHaler) 40 MCG/ACT inhaler Inhale 2 puffs 2 (Two) Times a Day. 10.6 g 5   • cetirizine (ZyrTEC) 10 MG tablet Take 1 tablet by mouth Daily.     • famotidine (Pepcid) 20 MG tablet Take 1 tablet by mouth 2 (Two) Times a Day. 60 tablet 3   • NIFEdipine XL (Procardia XL) 60 MG 24 hr tablet Take 1 tablet by mouth Daily. 90 tablet 10   • ondansetron ODT (ZOFRAN-ODT) 4 MG disintegrating tablet Place 1 tablet on the tongue Every 8 (Eight) Hours As Needed for Nausea or Vomiting. 30 tablet 2   • sertraline (ZOLOFT) 25 MG tablet TAKE 1 TABLET BY MOUTH DAILY 90 tablet 1     No current facility-administered medications on file prior to visit.        Past obstetric, gynecological, medical, surgical, family and social history reviewed.  Relevant lab work and imaging reviewed.    Review of systems  Constitutional:  denies fever, chills, malaise.   ENT/Mouth:  denies sore throat, tinnitus  Eyes: denies vision changes/pain  CV:  denies chest pain  Respiratory:  denies cough/SOB  GI:  denies  "N/V, diarrhea, abdominal pain.    :   denies dysuria  Skin:  denies lesions or pruritus   Neuro:  denies weakness, focal neurologic symptoms    Vitals:    24 0700 24 0816 24 0937   BP: 159/96 158/94 146/98   BP Location: Right arm Left arm Right arm   Patient Position: Sitting Sitting Sitting   Pulse: 92     Temp: 98.6 °F (37 °C)     TempSrc: Temporal     Weight: 132 kg (291 lb)     Height: 167.6 cm (66\")         PHYSICAL EXAM   GENERAL: Not in acute distress, AAOx3, pleasant  CARDIO: regular rate and rhythm  PULM: symmetric chest rise, speaking in complete sentences without difficulty  NEURO: awake, alert and oriented to person, place, and time  ABDOMINAL: No fundal tenderness, no rebound or guarding, gravid  EXTREMITIES: no bilateral lower extremity edema/tenderness  SKIN: Warm, well-perfused      ULTRASOUND   Please view full ultrasound note on Imaging tab in ViewPoint.  Dichorionic/diamniotic twin gestation.      A: Transverse presentation.    Anterior placenta.   MVP 5.5 cm, which is normal.   UA dopplers normal forward flow without elevated, absent, or reversed flow  BPP 8/8    B: Transverse presentation.  Posterior placenta  MVP 4.2 cm, which is normal.   UA dopplers normal forward flow without elevated, absent or reversed flow.    BPP 8/8      ASSESSMENT/COUNSELIN y.o. G 3 P 0020 at 28  5/7 weeks gestation (Estimated Date of Delivery: 24).     -Pregnancy  [ X ] stable  [   ] improving [  ] worsening    Diagnoses and all orders for this visit:    1. Chronic hypertension affecting pregnancy (Primary)  -     CBC & Differential  -     Comprehensive Metabolic Panel  -     Uric Acid  -     Lactate Dehydrogenase  -     Protein / Creatinine Ratio, Urine - Urine, Clean Catch    2. Dichorionic diamniotic twin pregnancy in third trimester    3. Pregnancy resulting from in vitro fertilization, antepartum- normal fetal echo, no further f/u needed    4. IUGR (intrauterine growth restriction) " affecting care of mother, second trimester, fetus 2- twin B FGR  Overview:  weekly dopplers and BPP's         IVF Pregnancy  Previously counseled.  ECHO normal.  Serial growth exams are indicated.      Dichorionic twin gestation  Previously counseled.      FGR baby B, normal dopplers.    She declined cell free DNA testing.    Previously Counseled   I think the most likely cause of this is placental insufficiency.  Today we have normal umbilical artery dopplers with an 8/8 bpp. which is reassuring.  We discussed umbilical artery doppler studies as 4 possible levels: normal, elevated, absent, and reversed.  We discussed that absent and reversed would require admission,  corticosteroids, and possibly delivery depending on gestational age and other testing.  We are doing weekly Dopplers/BPPs.      CHTN  Procardia 60 mg daily.  She denies swelling, HA, vision changes, etc.    Previously Counseled  Acceptable blood pressures in pregnancies with chronic hypertension and without renal damage are 120-140/70-90s. Newer data from the [facility] (2022, CHAP TRIAL), supports more aggressive bp treatment to below 140/90 (previously ,160 in CHTN) and that this does not impair fetal growth or well-being but reduces risks of pre-eclampsia,  birth, and other pregnancy morbidity.  I discussed this with the patient. She has been logging her pressures.  They are high normal at home, but more elevated here.  No symptoms and no severe range.    Discussed delivery timing.  At this point 36-37 weeks is appropriate given twins, CHTN, FGR.      Update 24  Reviewed ultrasound results with patient. Reviewed blood pressure logs - Bps at home are 130-140/80-90. Pt denies headache, vision changes, RUQ pain, etc. Will check pre-eclampsia labs outpatient. Pt aware of symptoms to watch for and will call if anything changes. Aware to go to L&D if any severe range pressures at home. Denies questions or concerns.     Summary of  Plan  -Growths every 4 weeks  - Twice weekly ANFS split between MFM and Primary OB (BPP/Dopplers at MFM once per week & BPP or NST at Primary OB once per week)  -Continue to monitor BP at home.  - Pre-eclampsia labs ordered  -Recommend 2 baby ASA  -Continue procardia  -Delivery 36-37 weeks.      Follow-up: weekly    Thank you for the consult and opportunity to care for this patient.  Please feel free to reach out with any questions or concerns.      I spent 22 minutes caring for this patient on this date of service. This time includes time spent by me in the following activities: preparing for the visit, reviewing tests, obtaining and/or reviewing a separately obtained history, performing a medically appropriate examination and/or evaluation, counseling and educating the patient/family/caregiver and independently interpreting results and communicating that information with the patient/family/caregiver with greater than 50% spent in counseling and coordination of care.     GANGA Emmanuel  Maternal Fetal Medicine-[facility]  Office: [phone]  [email or web address]      Pt reports that she is doing well and denies vaginal bleeding, cramping, contractions or LOF at this time. Reports active fetal movement. Reviewed when to call OB office or present to L&D for evaluation with symptoms such as decreased fetal movement, vaginal bleeding, LOF or ctxs. Pt verbalized understanding. Denies HA, visual changes or epigastric pain. Denies any additional complaints at time of appointment. Next OB appointment scheduled for 05/06/2024    Vitals:    05/01/24 0816   BP: 158/94   Pulse:    Temp:

## 2024-05-01 NOTE — PROGRESS NOTES
Pt reports that she is doing well and denies vaginal bleeding, cramping, contractions or LOF at this time. Reports active fetal movement. Reviewed when to call OB office or present to L&D for evaluation with symptoms such as decreased fetal movement, vaginal bleeding, LOF or ctxs. Pt verbalized understanding. Denies HA, visual changes or epigastric pain. Denies any additional complaints at time of appointment. Next OB appointment scheduled for 05/06/2024    Vitals:    05/01/24 0816   BP: 158/94   Pulse:    Temp:

## 2024-05-01 NOTE — PROGRESS NOTES
MATERNAL FETAL MEDICINE Consult Note    Dear Dr Tanja Jeter*:    Thank you for your kind referral of Paola Neves.  As you know, she is a 34 y.o. G 3 P 0020 at 28  5/7 weeks gestation (Estimated Date of Delivery: 7/19/24). This is a consult.     Her antepartum course is complicated by:  IVF  Di/Di Twins  CHTN  FGR baby B    Aneuploidy Screening: declined    HPI: Today, she denies headache, blurry vision, RUQ pain. No vaginal bleeding, no contractions.     Review of History:  Past Medical History:   Diagnosis Date    Abnormal Pap smear of cervix     6/2017 LGSIL/CIERA 1, 11/2017 normal    Anxiety and depression 3/5/2024    Asthma May 2007    Female infertility     Hypertension May 2016    Ovarian cyst      Past Surgical History:   Procedure Laterality Date    FERTILITY SURGERY  05/02/2022    IVF    WISDOM TOOTH EXTRACTION  04/2017     Social History     Socioeconomic History    Marital status:     Number of children: 0    Years of education: assoc degress   Tobacco Use    Smoking status: Never    Smokeless tobacco: Never   Vaping Use    Vaping status: Never Used   Substance and Sexual Activity    Alcohol use: Not Currently     Alcohol/week: 1.0 standard drink of alcohol     Types: 1 Glasses of wine per week     Comment: 1 a month    Drug use: No    Sexual activity: Yes     Partners: Male     Birth control/protection: None     Family History   Problem Relation Age of Onset    Thyroid disease Mother         Graves    Ulcerative colitis Mother     Hypertension Father     Hearing loss Father     Cancer Maternal Grandmother     Asthma Paternal Grandmother     Hearing loss Paternal Grandmother     Heart disease Paternal Grandfather     Diabetes Paternal Grandfather     Cancer Paternal Grandfather     No Known Problems Sister     Ulcerative colitis Brother     Breast cancer Neg Hx     Ovarian cancer Neg Hx     Uterine cancer Neg Hx     Colon cancer Neg Hx     Pulmonary embolism Neg Hx     Deep vein  "thrombosis Neg Hx       Allergies   Allergen Reactions    Penicillins Hives     Broke out in a rash in kindergarden    Sulfa Antibiotics Hives     Hives in college age      Current Outpatient Medications on File Prior to Visit   Medication Sig Dispense Refill    albuterol sulfate  (90 Base) MCG/ACT inhaler Inhale 2 puffs Every 4 (Four) Hours As Needed for Wheezing. 18 g 1    Beclomethasone Diprop HFA (Qvar RediHaler) 40 MCG/ACT inhaler Inhale 2 puffs 2 (Two) Times a Day. 10.6 g 5    cetirizine (ZyrTEC) 10 MG tablet Take 1 tablet by mouth Daily.      famotidine (Pepcid) 20 MG tablet Take 1 tablet by mouth 2 (Two) Times a Day. 60 tablet 3    NIFEdipine XL (Procardia XL) 60 MG 24 hr tablet Take 1 tablet by mouth Daily. 90 tablet 10    ondansetron ODT (ZOFRAN-ODT) 4 MG disintegrating tablet Place 1 tablet on the tongue Every 8 (Eight) Hours As Needed for Nausea or Vomiting. 30 tablet 2    sertraline (ZOLOFT) 25 MG tablet TAKE 1 TABLET BY MOUTH DAILY 90 tablet 1     No current facility-administered medications on file prior to visit.        Past obstetric, gynecological, medical, surgical, family and social history reviewed.  Relevant lab work and imaging reviewed.    Review of systems  Constitutional:  denies fever, chills, malaise.   ENT/Mouth:  denies sore throat, tinnitus  Eyes: denies vision changes/pain  CV:  denies chest pain  Respiratory:  denies cough/SOB  GI:  denies N/V, diarrhea, abdominal pain.    :   denies dysuria  Skin:  denies lesions or pruritus   Neuro:  denies weakness, focal neurologic symptoms    Vitals:    05/01/24 0700 05/01/24 0816 05/01/24 0937   BP: 159/96 158/94 146/98   BP Location: Right arm Left arm Right arm   Patient Position: Sitting Sitting Sitting   Pulse: 92     Temp: 98.6 °F (37 °C)     TempSrc: Temporal     Weight: 132 kg (291 lb)     Height: 167.6 cm (66\")         PHYSICAL EXAM   GENERAL: Not in acute distress, AAOx3, pleasant  CARDIO: regular rate and rhythm  PULM: " symmetric chest rise, speaking in complete sentences without difficulty  NEURO: awake, alert and oriented to person, place, and time  ABDOMINAL: No fundal tenderness, no rebound or guarding, gravid  EXTREMITIES: no bilateral lower extremity edema/tenderness  SKIN: Warm, well-perfused      ULTRASOUND   Please view full ultrasound note on Imaging tab in ViewPoint.  Dichorionic/diamniotic twin gestation.      A: Transverse presentation.    Anterior placenta.   MVP 5.5 cm, which is normal.   UA dopplers normal forward flow without elevated, absent, or reversed flow  BPP 8/8    B: Transverse presentation.  Posterior placenta  MVP 4.2 cm, which is normal.   UA dopplers normal forward flow without elevated, absent or reversed flow.    BPP 8/8      ASSESSMENT/COUNSELIN y.o. G 3 P 0020 at 28  5/7 weeks gestation (Estimated Date of Delivery: 24).     -Pregnancy  [ X ] stable  [   ] improving [  ] worsening    Diagnoses and all orders for this visit:    1. Chronic hypertension affecting pregnancy (Primary)  -     CBC & Differential  -     Comprehensive Metabolic Panel  -     Uric Acid  -     Lactate Dehydrogenase  -     Protein / Creatinine Ratio, Urine - Urine, Clean Catch    2. Dichorionic diamniotic twin pregnancy in third trimester    3. Pregnancy resulting from in vitro fertilization, antepartum- normal fetal echo, no further f/u needed    4. IUGR (intrauterine growth restriction) affecting care of mother, second trimester, fetus 2- twin B FGR  Overview:  weekly dopplers and BPP's         IVF Pregnancy  Previously counseled.  ECHO normal.  Serial growth exams are indicated.      Dichorionic twin gestation  Previously counseled.      FGR baby B, normal dopplers.    She declined cell free DNA testing.    Previously Counseled   I think the most likely cause of this is placental insufficiency.  Today we have normal umbilical artery dopplers with an 8/8 bpp. which is reassuring.  We discussed umbilical artery  doppler studies as 4 possible levels: normal, elevated, absent, and reversed.  We discussed that absent and reversed would require admission,  corticosteroids, and possibly delivery depending on gestational age and other testing.  We are doing weekly Dopplers/BPPs.      CHTN  Procardia 60 mg daily.  She denies swelling, HA, vision changes, etc.    Previously Counseled  Acceptable blood pressures in pregnancies with chronic hypertension and without renal damage are 120-140/70-90s. Newer data from the [facility] (2022, CHAP TRIAL), supports more aggressive bp treatment to below 140/90 (previously ,160 in CHTN) and that this does not impair fetal growth or well-being but reduces risks of pre-eclampsia,  birth, and other pregnancy morbidity.  I discussed this with the patient. She has been logging her pressures.  They are high normal at home, but more elevated here.  No symptoms and no severe range.    Discussed delivery timing.  At this point 36-37 weeks is appropriate given twins, TISHTN, FGR.      Update 24  Reviewed ultrasound results with patient. Reviewed blood pressure logs - Bps at home are 130-140/80-90. Pt denies headache, vision changes, RUQ pain, etc. Will check pre-eclampsia labs outpatient. Pt aware of symptoms to watch for and will call if anything changes. Aware to go to L&D if any severe range pressures at home. Denies questions or concerns.     Summary of Plan  -Growths every 4 weeks  - Twice weekly ANFS split between MFM and Primary OB (BPP/Dopplers at MFM once per week & BPP or NST at Primary OB once per week)  -Continue to monitor BP at home.  - Pre-eclampsia labs ordered  -Recommend 2 baby ASA  -Continue procardia  -Delivery 36-37 weeks.      Follow-up: weekly    Thank you for the consult and opportunity to care for this patient.  Please feel free to reach out with any questions or concerns.      I spent 22 minutes caring for this patient on this date of service. This time  includes time spent by me in the following activities: preparing for the visit, reviewing tests, obtaining and/or reviewing a separately obtained history, performing a medically appropriate examination and/or evaluation, counseling and educating the patient/family/caregiver and independently interpreting results and communicating that information with the patient/family/caregiver with greater than 50% spent in counseling and coordination of care.     GANGA Emmanuel  Maternal Fetal Medicine-[facility]  Office: [phone]  [email or web address]

## 2024-05-06 ENCOUNTER — ROUTINE PRENATAL (OUTPATIENT)
Dept: OBSTETRICS AND GYNECOLOGY | Facility: CLINIC | Age: 34
End: 2024-05-06
Payer: COMMERCIAL

## 2024-05-06 VITALS — SYSTOLIC BLOOD PRESSURE: 148 MMHG | WEIGHT: 293 LBS | DIASTOLIC BLOOD PRESSURE: 100 MMHG | BODY MASS INDEX: 47.49 KG/M2

## 2024-05-06 DIAGNOSIS — Z36.9 ENCOUNTER FOR ANTENATAL SCREENING, UNSPECIFIED: Primary | ICD-10-CM

## 2024-05-06 DIAGNOSIS — O24.410 GDM (GESTATIONAL DIABETES MELLITUS), CLASS A1: ICD-10-CM

## 2024-05-06 DIAGNOSIS — O09.819 PREGNANCY RESULTING FROM IN VITRO FERTILIZATION, ANTEPARTUM: ICD-10-CM

## 2024-05-06 DIAGNOSIS — O30.043 DICHORIONIC DIAMNIOTIC TWIN PREGNANCY IN THIRD TRIMESTER: ICD-10-CM

## 2024-05-06 PROCEDURE — 0502F SUBSEQUENT PRENATAL CARE: CPT | Performed by: OBSTETRICS & GYNECOLOGY

## 2024-05-07 ENCOUNTER — HOSPITAL ENCOUNTER (OUTPATIENT)
Dept: DIABETES SERVICES | Facility: HOSPITAL | Age: 34
Discharge: HOME | End: 2024-05-07
Admitting: NURSE PRACTITIONER
Payer: MEDICAID

## 2024-05-07 PROCEDURE — G0108 DIAB MANAGE TRN  PER INDIV: HCPCS

## 2024-05-08 ENCOUNTER — OFFICE VISIT (OUTPATIENT)
Dept: OBSTETRICS AND GYNECOLOGY | Facility: CLINIC | Age: 34
End: 2024-05-08
Payer: MEDICAID

## 2024-05-08 ENCOUNTER — HOSPITAL ENCOUNTER (OUTPATIENT)
Dept: ULTRASOUND IMAGING | Facility: HOSPITAL | Age: 34
Discharge: HOME | End: 2024-05-08
Admitting: OBSTETRICS & GYNECOLOGY
Payer: MEDICAID

## 2024-05-08 VITALS
HEIGHT: 66 IN | TEMPERATURE: 98.4 F | SYSTOLIC BLOOD PRESSURE: 144 MMHG | HEART RATE: 90 BPM | DIASTOLIC BLOOD PRESSURE: 94 MMHG | BODY MASS INDEX: 47.09 KG/M2 | WEIGHT: 293 LBS

## 2024-05-08 DIAGNOSIS — O30.043 DICHORIONIC DIAMNIOTIC TWIN PREGNANCY IN THIRD TRIMESTER: Primary | ICD-10-CM

## 2024-05-08 DIAGNOSIS — O10.919 CHRONIC HYPERTENSION AFFECTING PREGNANCY: ICD-10-CM

## 2024-05-08 DIAGNOSIS — O36.5922 IUGR (INTRAUTERINE GROWTH RESTRICTION) AFFECTING CARE OF MOTHER, SECOND TRIMESTER, FETUS 2: ICD-10-CM

## 2024-05-08 PROCEDURE — 76819 FETAL BIOPHYS PROFIL W/O NST: CPT

## 2024-05-08 PROCEDURE — 76820 UMBILICAL ARTERY ECHO: CPT

## 2024-05-13 ENCOUNTER — ROUTINE PRENATAL (OUTPATIENT)
Dept: OBSTETRICS AND GYNECOLOGY | Facility: CLINIC | Age: 34
End: 2024-05-13
Payer: MEDICAID

## 2024-05-13 VITALS — WEIGHT: 293 LBS | SYSTOLIC BLOOD PRESSURE: 152 MMHG | BODY MASS INDEX: 47.78 KG/M2 | DIASTOLIC BLOOD PRESSURE: 102 MMHG

## 2024-05-13 DIAGNOSIS — O09.899 RUBELLA NON-IMMUNE STATUS, ANTEPARTUM: ICD-10-CM

## 2024-05-13 DIAGNOSIS — I10 PRIMARY HYPERTENSION: Primary | ICD-10-CM

## 2024-05-13 DIAGNOSIS — O24.410 GDM (GESTATIONAL DIABETES MELLITUS), CLASS A1: ICD-10-CM

## 2024-05-13 DIAGNOSIS — J45.30 MILD PERSISTENT ASTHMA WITHOUT COMPLICATION: ICD-10-CM

## 2024-05-13 DIAGNOSIS — Z28.39 RUBELLA NON-IMMUNE STATUS, ANTEPARTUM: ICD-10-CM

## 2024-05-13 DIAGNOSIS — Z36.9 ENCOUNTER FOR ANTENATAL SCREENING, UNSPECIFIED: ICD-10-CM

## 2024-05-13 DIAGNOSIS — F32.A ANXIETY AND DEPRESSION: ICD-10-CM

## 2024-05-13 DIAGNOSIS — O30.049 DICHORIONIC DIAMNIOTIC TWIN PREGNANCY, ANTEPARTUM: ICD-10-CM

## 2024-05-13 DIAGNOSIS — F41.9 ANXIETY AND DEPRESSION: ICD-10-CM

## 2024-05-13 DIAGNOSIS — E66.01 CLASS 3 SEVERE OBESITY DUE TO EXCESS CALORIES WITH SERIOUS COMORBIDITY AND BODY MASS INDEX (BMI) OF 45.0 TO 49.9 IN ADULT: ICD-10-CM

## 2024-05-13 DIAGNOSIS — O09.819 PREGNANCY RESULTING FROM IN VITRO FERTILIZATION, ANTEPARTUM: ICD-10-CM

## 2024-05-13 DIAGNOSIS — O36.5922 IUGR (INTRAUTERINE GROWTH RESTRICTION) AFFECTING CARE OF MOTHER, SECOND TRIMESTER, FETUS 2: ICD-10-CM

## 2024-05-13 DIAGNOSIS — L63.9 ALOPECIA AREATA: ICD-10-CM

## 2024-05-13 PROBLEM — O99.210 MATERNAL OBESITY AFFECTING PREGNANCY, ANTEPARTUM: Status: RESOLVED | Noted: 2024-01-10 | Resolved: 2024-05-13

## 2024-05-13 PROBLEM — N97.9 INFERTILITY, FEMALE: Status: RESOLVED | Noted: 2021-04-16 | Resolved: 2024-05-13

## 2024-05-13 PROCEDURE — 99214 OFFICE O/P EST MOD 30 MIN: CPT | Performed by: OBSTETRICS & GYNECOLOGY

## 2024-05-13 RX ORDER — NIFEDIPINE 60 MG/1
60 TABLET, EXTENDED RELEASE ORAL 2 TIMES DAILY
Qty: 90 TABLET | Refills: 10 | Status: SHIPPED | OUTPATIENT
Start: 2024-05-13

## 2024-05-13 RX ORDER — METFORMIN HYDROCHLORIDE 750 MG/1
750 TABLET, EXTENDED RELEASE ORAL
Qty: 30 TABLET | Refills: 11 | Status: SHIPPED | OUTPATIENT
Start: 2024-05-13 | End: 2025-05-13

## 2024-05-13 NOTE — PROGRESS NOTES
S:  cc: Pt here for ob office visit and ANT for di-di twins, A1DM, GR, GHTN  hpi: Paola Neves is a 34 y.o.  30w3d being seen today for her obstetrical visit.   Patient reports no complaints .   Fetal movement: normal. .      Social History     Social History Narrative    Not on file      SMOKER? No      O:  BP (!) 152/102   Wt 134 kg (296 lb)   BMI 47.78 kg/m²     Prenatal Assessment  Fetal Heart Rate: present  Movement: Present  Prenatal Vitals  BP: (!) 152/102  Weight: 134 kg (296 lb)  Vaginal Drainage  Draining Fluid: No  Edema  LLE Edema: None  RLE Edema: None  Facial Edema: None    Brief Urine Lab Results  (Last result in the past 365 days)        Color   Clarity   Blood   Leuk Est   Nitrite   Protein   CREAT   Urine HCG        24 1429 Yellow   Clear   Negative   Negative   Negative   Negative                   A:    DIAGNOSES:  34 y.o.  30w3d  Diagnoses and all orders for this visit:    1. Primary hypertension (Primary)  -     Uric Acid  -     CBC & Differential  -     Comprehensive Metabolic Panel  -     Protein / Creatinine Ratio, Urine - Urine, Clean Catch    2. Class 3 severe obesity due to excess calories with serious comorbidity and body mass index (BMI) of 45.0 to 49.9 in adult    3. GDM (gestational diabetes mellitus), class A1    4. Dichorionic diamniotic twin pregnancy, antepartum    5. Pregnancy resulting from in vitro fertilization, antepartum- normal fetal echo, no further f/u needed    6. Rubella non-immune status, antepartum    7. Anxiety and depression    8. Mild persistent asthma without complication    9. Alopecia areata    10. IUGR (intrauterine growth restriction) affecting care of mother, second trimester, fetus 2- twin B FGR    11. Encounter for  screening, unspecified  -     Cancel: POC Pregnancy, Urine    Other orders  -     metFORMIN ER (GLUCOPHAGE-XR) 750 MG 24 hr tablet; Take 1 tablet by mouth Daily With Breakfast.  Dispense: 30 tablet; Refill:  11  -     NIFEdipine XL (Procardia XL) 60 MG 24 hr tablet; Take 1 tablet by mouth 2 (Two) Times a Day.  Dispense: 90 tablet; Refill: 10      NEW PROBLEMS? Unresponsive GHTN.  Pt not on aspirin. Glucometer values: about half are elevated.  Will start glyburide, but pt allergic to sulfa w/ hives; will start meformin.       P:  Return in about 1 week (around 5/20/2024) for ob tummy., weekly ANT w/ our practice and MFM.  Will double procardia, start glyburide and check labs.    Pt instructed to call for results of any testing done today and that failure to call if she has not heard from us could result in inadequate care and treament.  Pt verbalized her understanding.     Paulie Martin MD  15:07 EDT   05/13/24

## 2024-05-14 LAB
ALBUMIN SERPL-MCNC: 3.5 G/DL (ref 3.5–5.2)
ALBUMIN/GLOB SERPL: 1.5 G/DL
ALP SERPL-CCNC: 102 U/L (ref 39–117)
ALT SERPL-CCNC: 16 U/L (ref 1–33)
AST SERPL-CCNC: 18 U/L (ref 1–32)
BASOPHILS # BLD AUTO: 0.1 10*3/MM3 (ref 0–0.2)
BASOPHILS NFR BLD AUTO: 1 % (ref 0–1.5)
BILIRUB SERPL-MCNC: <0.2 MG/DL (ref 0–1.2)
BUN SERPL-MCNC: 11 MG/DL (ref 6–20)
BUN/CREAT SERPL: 18 (ref 7–25)
CALCIUM SERPL-MCNC: 8.8 MG/DL (ref 8.6–10.5)
CHLORIDE SERPL-SCNC: 103 MMOL/L (ref 98–107)
CO2 SERPL-SCNC: 22.3 MMOL/L (ref 22–29)
CREAT SERPL-MCNC: 0.61 MG/DL (ref 0.57–1)
CREAT UR-MCNC: 151.5 MG/DL
EGFRCR SERPLBLD CKD-EPI 2021: 120.5 ML/MIN/1.73
EOSINOPHIL # BLD AUTO: 0.14 10*3/MM3 (ref 0–0.4)
EOSINOPHIL NFR BLD AUTO: 1.4 % (ref 0.3–6.2)
ERYTHROCYTE [DISTWIDTH] IN BLOOD BY AUTOMATED COUNT: 13 % (ref 12.3–15.4)
GLOBULIN SER CALC-MCNC: 2.3 GM/DL
GLUCOSE SERPL-MCNC: 90 MG/DL (ref 65–99)
HCT VFR BLD AUTO: 38.9 % (ref 34–46.6)
HGB BLD-MCNC: 12.9 G/DL (ref 12–15.9)
IMM GRANULOCYTES # BLD AUTO: 0.05 10*3/MM3 (ref 0–0.05)
IMM GRANULOCYTES NFR BLD AUTO: 0.5 % (ref 0–0.5)
LYMPHOCYTES # BLD AUTO: 1.44 10*3/MM3 (ref 0.7–3.1)
LYMPHOCYTES NFR BLD AUTO: 14.6 % (ref 19.6–45.3)
MCH RBC QN AUTO: 29.8 PG (ref 26.6–33)
MCHC RBC AUTO-ENTMCNC: 33.2 G/DL (ref 31.5–35.7)
MCV RBC AUTO: 89.8 FL (ref 79–97)
MONOCYTES # BLD AUTO: 0.85 10*3/MM3 (ref 0.1–0.9)
MONOCYTES NFR BLD AUTO: 8.6 % (ref 5–12)
NEUTROPHILS # BLD AUTO: 7.27 10*3/MM3 (ref 1.7–7)
NEUTROPHILS NFR BLD AUTO: 73.9 % (ref 42.7–76)
NRBC BLD AUTO-RTO: 0 /100 WBC (ref 0–0.2)
PLATELET # BLD AUTO: 253 10*3/MM3 (ref 140–450)
POTASSIUM SERPL-SCNC: 4.5 MMOL/L (ref 3.5–5.2)
PROT SERPL-MCNC: 5.8 G/DL (ref 6–8.5)
PROT UR-MCNC: 25.1 MG/DL
PROT/CREAT UR: 165.7 MG/G CREA (ref 0–200)
RBC # BLD AUTO: 4.33 10*6/MM3 (ref 3.77–5.28)
SODIUM SERPL-SCNC: 137 MMOL/L (ref 136–145)
URATE SERPL-MCNC: 5.5 MG/DL (ref 2.4–5.7)
WBC # BLD AUTO: 9.85 10*3/MM3 (ref 3.4–10.8)

## 2024-05-15 ENCOUNTER — HOSPITAL ENCOUNTER (OUTPATIENT)
Dept: ULTRASOUND IMAGING | Facility: HOSPITAL | Age: 34
Discharge: HOME | End: 2024-05-15
Admitting: OBSTETRICS & GYNECOLOGY
Payer: COMMERCIAL

## 2024-05-15 ENCOUNTER — OFFICE VISIT (OUTPATIENT)
Dept: OBSTETRICS AND GYNECOLOGY | Facility: CLINIC | Age: 34
End: 2024-05-15
Payer: MEDICAID

## 2024-05-15 VITALS
TEMPERATURE: 98.7 F | DIASTOLIC BLOOD PRESSURE: 92 MMHG | WEIGHT: 293 LBS | HEART RATE: 84 BPM | SYSTOLIC BLOOD PRESSURE: 140 MMHG | BODY MASS INDEX: 48.2 KG/M2 | OXYGEN SATURATION: 99 %

## 2024-05-15 DIAGNOSIS — O30.049 DICHORIONIC DIAMNIOTIC TWIN PREGNANCY, ANTEPARTUM: Primary | ICD-10-CM

## 2024-05-15 DIAGNOSIS — O24.410 GDM (GESTATIONAL DIABETES MELLITUS), CLASS A1: ICD-10-CM

## 2024-05-15 DIAGNOSIS — E66.01 CLASS 3 SEVERE OBESITY DUE TO EXCESS CALORIES WITH SERIOUS COMORBIDITY AND BODY MASS INDEX (BMI) OF 45.0 TO 49.9 IN ADULT: ICD-10-CM

## 2024-05-15 PROCEDURE — 76816 OB US FOLLOW-UP PER FETUS: CPT

## 2024-05-15 PROCEDURE — 76819 FETAL BIOPHYS PROFIL W/O NST: CPT

## 2024-05-15 NOTE — PROGRESS NOTES
MATERNAL FETAL MEDICINE Consult Note    Dear Dr Tanja Jeter*:    Thank you for your kind referral of Paola Neves.  As you know, she is a 34 y.o. G 3 P 0020 at 30  5/7 weeks gestation (Estimated Date of Delivery: 7/19/24). This is a consult.     Her antepartum course is complicated by:  IVF  Di/Di Twins  CHTN  FGR baby B    Aneuploidy Screening: declined    HPI: Today, she denies headache, blurry vision, RUQ pain. No vaginal bleeding, no contractions.     Review of History:  Past Medical History:   Diagnosis Date    Abnormal Pap smear of cervix     6/2017 LGSIL/CIERA 1, 11/2017 normal    Anxiety and depression 03/05/2024    Asthma May 2007    Female infertility     GDM (gestational diabetes mellitus)     Hypertension May 2016    Ovarian cyst      Past Surgical History:   Procedure Laterality Date    FERTILITY SURGERY  05/02/2022    IVF    WISDOM TOOTH EXTRACTION  04/2017     Social History     Socioeconomic History    Marital status:     Number of children: 0    Years of education: assoc degress   Tobacco Use    Smoking status: Never     Passive exposure: Never    Smokeless tobacco: Never   Vaping Use    Vaping status: Never Used   Substance and Sexual Activity    Alcohol use: Not Currently     Alcohol/week: 1.0 standard drink of alcohol     Types: 1 Glasses of wine per week     Comment: 1 a month    Drug use: No    Sexual activity: Yes     Partners: Male     Birth control/protection: None     Family History   Problem Relation Age of Onset    Thyroid disease Mother         Graves    Ulcerative colitis Mother     Hypertension Father     Hearing loss Father     Cancer Maternal Grandmother     Asthma Paternal Grandmother     Hearing loss Paternal Grandmother     Heart disease Paternal Grandfather     Diabetes Paternal Grandfather     Cancer Paternal Grandfather     No Known Problems Sister     Ulcerative colitis Brother     Breast cancer Neg Hx     Ovarian cancer Neg Hx     Uterine cancer Neg Hx      Colon cancer Neg Hx     Pulmonary embolism Neg Hx     Deep vein thrombosis Neg Hx       Allergies   Allergen Reactions    Penicillins Hives     Broke out in a rash in kindergarden    Sulfa Antibiotics Hives     Hives in college age      Current Outpatient Medications on File Prior to Visit   Medication Sig Dispense Refill    albuterol sulfate  (90 Base) MCG/ACT inhaler Inhale 2 puffs Every 4 (Four) Hours As Needed for Wheezing. 18 g 1    Beclomethasone Diprop HFA (Qvar RediHaler) 40 MCG/ACT inhaler Inhale 2 puffs 2 (Two) Times a Day. 10.6 g 5    cetirizine (ZyrTEC) 10 MG tablet Take 1 tablet by mouth Daily.      famotidine (Pepcid) 20 MG tablet Take 1 tablet by mouth 2 (Two) Times a Day. 60 tablet 3    glucose blood test strip Check blood sugars 4 times daily 120 each 6    glucose monitor monitoring kit Check blood sugar 4 times daily 1 each 1    Lancets misc Check blood sugar 4 times daily 120 each 6    metFORMIN ER (GLUCOPHAGE-XR) 750 MG 24 hr tablet Take 1 tablet by mouth Daily With Breakfast. 30 tablet 11    NIFEdipine XL (Procardia XL) 60 MG 24 hr tablet Take 1 tablet by mouth 2 (Two) Times a Day. 90 tablet 10    ondansetron ODT (ZOFRAN-ODT) 4 MG disintegrating tablet Place 1 tablet on the tongue Every 8 (Eight) Hours As Needed for Nausea or Vomiting. 30 tablet 2    sertraline (ZOLOFT) 25 MG tablet TAKE 1 TABLET BY MOUTH DAILY 90 tablet 1     No current facility-administered medications on file prior to visit.        Past obstetric, gynecological, medical, surgical, family and social history reviewed.  Relevant lab work and imaging reviewed.    Review of systems  Constitutional:  denies fever, chills, malaise.   ENT/Mouth:  denies sore throat, tinnitus  Eyes: denies vision changes/pain  CV:  denies chest pain  Respiratory:  denies cough/SOB  GI:  denies N/V, diarrhea, abdominal pain.    :   denies dysuria  Skin:  denies lesions or pruritus   Neuro:  denies weakness, focal neurologic  symptoms    Vitals:    05/15/24 0810 05/15/24 0813   BP: 142/90 140/90   BP Location: Left arm Right arm   Patient Position: Sitting Sitting       Common labs          2024    09:34 2024    10:12 2024    15:03   Common Labs   Glucose  96  90    BUN  8  11    Creatinine  0.59  0.61    Sodium  137  137    Potassium  4.4  4.5    Chloride  106  103    Calcium  8.9  8.8    Total Protein   5.8    Albumin  3.4  3.5    Total Bilirubin  0.2  <0.2    Alkaline Phosphatase  102  102    AST (SGOT)  16  18    ALT (SGPT)  10  16    WBC  14.13  9.85    Hemoglobin 12.6  12.2  12.9    Hematocrit 38.0  37.3  38.9    Platelets  267  253    Uric Acid  4.8  5.5        PHYSICAL EXAM   GENERAL: Not in acute distress, AAOx3, pleasant  CARDIO: regular rate and rhythm  PULM: symmetric chest rise, speaking in complete sentences without difficulty  NEURO: awake, alert and oriented to person, place, and time  ABDOMINAL: No fundal tenderness, no rebound or guarding, gravid  EXTREMITIES: no bilateral lower extremity edema/tenderness  SKIN: Warm, well-perfused    ULTRASOUND   Please view full ultrasound note on Imaging tab in ViewPoint.  Dichorionic/diamniotic twin gestation.      A: Transverse presentation.    Anterior placenta.   MVP 4.4 cm, which is normal.   EFW 1443 g (34%, AC 30%)  BPP 8/8  UA dopplers normal forward flow (1/5 elevated) without absent or reversed flow.      B: Transverse presentation.  Posterior placenta  MVP 5.4 cm, which is normal.   EFW 1188 g (16%, AC <1%)  BPP 8/8  UA dopplers normal forward flow (1/5 elevated) without absent or reversed flow.    Discordance 17%, which is normal.      ASSESSMENT/COUNSELIN y.o. G 3 P 0020 at 30  5/7 weeks gestation (Estimated Date of Delivery: 24).     -Pregnancy  [ X ] stable  [   ] improving [  ] worsening    There are no diagnoses linked to this encounter.      IVF Pregnancy  Previously counseled.  ECHO normal.  Serial growth exams are indicated.       Dichorionic twin gestation  Previously counseled.      FGR baby B, normal dopplers.    She declined cell free DNA testing.    Previously Counseled   I think the most likely cause of this is placental insufficiency.  Today we have normal umbilical artery dopplers with an 8/8 bpp. which is reassuring.  We discussed umbilical artery doppler studies as 4 possible levels: normal, elevated, absent, and reversed.  We discussed that absent and reversed would require admission,  corticosteroids, and possibly delivery depending on gestational age and other testing.  We are doing weekly Dopplers/BPPs.      CHTN  Procardia 60 mg BID. She denies swelling, HA, vision changes, etc.    We will have her send in on Friday and if still seeing 150's systolic, will add in a low dose of labetalol.      Previously Counseled  Acceptable blood pressures in pregnancies with chronic hypertension and without renal damage are 120-140/70-90s. Newer data from the [facility] (2022, CHAP TRIAL), supports more aggressive bp treatment to below 140/90 (previously ,160 in CHTN) and that this does not impair fetal growth or well-being but reduces risks of pre-eclampsia,  birth, and other pregnancy morbidity.  I discussed this with the patient. She has been logging her pressures.  They are increasing at home but she had recent PIH labs and P/C that were very normal.    Discussed delivery timing.  At this point 36-37 weeks is appropriate given twins, CHTN, FGR.      GDMA2:  On orals.  Managed by OB.      Summary of Plan  -Growths every 4 weeks  - Twice weekly ANFS split between M and Primary OB (BPP/Dopplers at MFM once per week & BPP or NST at Primary OB once per week)  -Continue to monitor BP at home.  -Recommend 2 baby ASA  -Continue procardia 60 BID--send BP's Friday and may need to add labetalol.    -Delivery 36-37 weeks.      Follow-up: weekly    Thank you for the consult and opportunity to care for this patient.  Please feel  free to reach out with any questions or concerns.      I spent 27 minutes caring for this patient on this date of service. This time includes time spent by me in the following activities: preparing for the visit, reviewing tests, obtaining and/or reviewing a separately obtained history, performing a medically appropriate examination and/or evaluation, counseling and educating the patient/family/caregiver and independently interpreting results and communicating that information with the patient/family/caregiver with greater than 50% spent in counseling and coordination of care.     7 minutes reading US.      Tamika Humphreys MD FACOG  Maternal Fetal Medicine-[facility]  Office: [phone]  [email or web address]

## 2024-05-15 NOTE — LETTER
May 15, 2024     Tanja Jeter MD  [address]  [address]  [place] [address]    Patient: Paola Neves   YOB: 1990   Date of Visit: 5/15/2024       Dear Tanja Jeter MD    Paola Neves was in my office today. Below is a copy of my note.    If you have questions, please do not hesitate to call me. I look forward to following Paola along with you.         Sincerely,        Tamika Humphreys MD      MATERNAL FETAL MEDICINE Consult Note    Dear Dr Tanja Jeter*:    Thank you for your kind referral of Paola Neves.  As you know, she is a 34 y.o. G 3 P 0020 at 30  5/7 weeks gestation (Estimated Date of Delivery: 7/19/24). This is a consult.     Her antepartum course is complicated by:  IVF  Di/Di Twins  CHTN  FGR baby B    Aneuploidy Screening: declined    HPI: Today, she denies headache, blurry vision, RUQ pain. No vaginal bleeding, no contractions.     Review of History:  Past Medical History:   Diagnosis Date   • Abnormal Pap smear of cervix     6/2017 LGSIL/CIERA 1, 11/2017 normal   • Anxiety and depression 03/05/2024   • Asthma May 2007   • Female infertility    • GDM (gestational diabetes mellitus)    • Hypertension May 2016   • Ovarian cyst      Past Surgical History:   Procedure Laterality Date   • FERTILITY SURGERY  05/02/2022    IVF   • WISDOM TOOTH EXTRACTION  04/2017     Social History     Socioeconomic History   • Marital status:    • Number of children: 0   • Years of education: assoc degress   Tobacco Use   • Smoking status: Never     Passive exposure: Never   • Smokeless tobacco: Never   Vaping Use   • Vaping status: Never Used   Substance and Sexual Activity   • Alcohol use: Not Currently     Alcohol/week: 1.0 standard drink of alcohol     Types: 1 Glasses of wine per week     Comment: 1 a month   • Drug use: No   • Sexual activity: Yes     Partners: Male     Birth control/protection: None     Family History   Problem Relation Age of  Onset   • Thyroid disease Mother         Graves   • Ulcerative colitis Mother    • Hypertension Father    • Hearing loss Father    • Cancer Maternal Grandmother    • Asthma Paternal Grandmother    • Hearing loss Paternal Grandmother    • Heart disease Paternal Grandfather    • Diabetes Paternal Grandfather    • Cancer Paternal Grandfather    • No Known Problems Sister    • Ulcerative colitis Brother    • Breast cancer Neg Hx    • Ovarian cancer Neg Hx    • Uterine cancer Neg Hx    • Colon cancer Neg Hx    • Pulmonary embolism Neg Hx    • Deep vein thrombosis Neg Hx       Allergies   Allergen Reactions   • Penicillins Hives     Broke out in a rash in kindergarden   • Sulfa Antibiotics Hives     Hives in college age      Current Outpatient Medications on File Prior to Visit   Medication Sig Dispense Refill   • albuterol sulfate  (90 Base) MCG/ACT inhaler Inhale 2 puffs Every 4 (Four) Hours As Needed for Wheezing. 18 g 1   • Beclomethasone Diprop HFA (Qvar RediHaler) 40 MCG/ACT inhaler Inhale 2 puffs 2 (Two) Times a Day. 10.6 g 5   • cetirizine (ZyrTEC) 10 MG tablet Take 1 tablet by mouth Daily.     • famotidine (Pepcid) 20 MG tablet Take 1 tablet by mouth 2 (Two) Times a Day. 60 tablet 3   • glucose blood test strip Check blood sugars 4 times daily 120 each 6   • glucose monitor monitoring kit Check blood sugar 4 times daily 1 each 1   • Lancets misc Check blood sugar 4 times daily 120 each 6   • metFORMIN ER (GLUCOPHAGE-XR) 750 MG 24 hr tablet Take 1 tablet by mouth Daily With Breakfast. 30 tablet 11   • NIFEdipine XL (Procardia XL) 60 MG 24 hr tablet Take 1 tablet by mouth 2 (Two) Times a Day. 90 tablet 10   • ondansetron ODT (ZOFRAN-ODT) 4 MG disintegrating tablet Place 1 tablet on the tongue Every 8 (Eight) Hours As Needed for Nausea or Vomiting. 30 tablet 2   • sertraline (ZOLOFT) 25 MG tablet TAKE 1 TABLET BY MOUTH DAILY 90 tablet 1     No current facility-administered medications on file prior to visit.         Past obstetric, gynecological, medical, surgical, family and social history reviewed.  Relevant lab work and imaging reviewed.    Review of systems  Constitutional:  denies fever, chills, malaise.   ENT/Mouth:  denies sore throat, tinnitus  Eyes: denies vision changes/pain  CV:  denies chest pain  Respiratory:  denies cough/SOB  GI:  denies N/V, diarrhea, abdominal pain.    :   denies dysuria  Skin:  denies lesions or pruritus   Neuro:  denies weakness, focal neurologic symptoms    Vitals:    05/15/24 0810 05/15/24 0813   BP: 142/90 140/90   BP Location: Left arm Right arm   Patient Position: Sitting Sitting       Common labs          4/30/2024    09:34 5/1/2024    10:12 5/13/2024    15:03   Common Labs   Glucose  96  90    BUN  8  11    Creatinine  0.59  0.61    Sodium  137  137    Potassium  4.4  4.5    Chloride  106  103    Calcium  8.9  8.8    Total Protein   5.8    Albumin  3.4  3.5    Total Bilirubin  0.2  <0.2    Alkaline Phosphatase  102  102    AST (SGOT)  16  18    ALT (SGPT)  10  16    WBC  14.13  9.85    Hemoglobin 12.6  12.2  12.9    Hematocrit 38.0  37.3  38.9    Platelets  267  253    Uric Acid  4.8  5.5        PHYSICAL EXAM   GENERAL: Not in acute distress, AAOx3, pleasant  CARDIO: regular rate and rhythm  PULM: symmetric chest rise, speaking in complete sentences without difficulty  NEURO: awake, alert and oriented to person, place, and time  ABDOMINAL: No fundal tenderness, no rebound or guarding, gravid  EXTREMITIES: no bilateral lower extremity edema/tenderness  SKIN: Warm, well-perfused    ULTRASOUND   Please view full ultrasound note on Imaging tab in ViewPoint.  Dichorionic/diamniotic twin gestation.      A: Transverse presentation.    Anterior placenta.   MVP 4.4 cm, which is normal.   EFW 1443 g (34%, AC 30%)  BPP 8/8  UA dopplers normal forward flow (1/5 elevated) without absent or reversed flow.      B: Transverse presentation.  Posterior placenta  MVP 5.4 cm, which is normal.    EFW 1188 g (16%, AC <1%)  BPP 8/8  UA dopplers normal forward flow (1/5 elevated) without absent or reversed flow.    Discordance 17%, which is normal.      ASSESSMENT/COUNSELIN y.o. G 3 P 0020 at 30  5/7 weeks gestation (Estimated Date of Delivery: 24).     -Pregnancy  [ X ] stable  [   ] improving [  ] worsening    There are no diagnoses linked to this encounter.      IVF Pregnancy  Previously counseled.  ECHO normal.  Serial growth exams are indicated.      Dichorionic twin gestation  Previously counseled.      FGR baby B, normal dopplers.    She declined cell free DNA testing.    Previously Counseled   I think the most likely cause of this is placental insufficiency.  Today we have normal umbilical artery dopplers with an 8/8 bpp. which is reassuring.  We discussed umbilical artery doppler studies as 4 possible levels: normal, elevated, absent, and reversed.  We discussed that absent and reversed would require admission,  corticosteroids, and possibly delivery depending on gestational age and other testing.  We are doing weekly Dopplers/BPPs.      CHTN  Procardia 60 mg BID. She denies swelling, HA, vision changes, etc.    We will have her send in on Friday and if still seeing 150's systolic, will add in a low dose of labetalol.      Previously Counseled  Acceptable blood pressures in pregnancies with chronic hypertension and without renal damage are 120-140/70-90s. Newer data from the [facility] (2022, CHAP TRIAL), supports more aggressive bp treatment to below 140/90 (previously ,160 in CHTN) and that this does not impair fetal growth or well-being but reduces risks of pre-eclampsia,  birth, and other pregnancy morbidity.  I discussed this with the patient. She has been logging her pressures.  They are increasing at home but she had recent PIH labs and P/C that were very normal.    Discussed delivery timing.  At this point 36-37 weeks is appropriate given twins, CHTN, FGR.       GDMA2:  On orals.  Managed by OB.      Summary of Plan  -Growths every 4 weeks  - Twice weekly ANFS split between MFM and Primary OB (BPP/Dopplers at MFM once per week & BPP or NST at Primary OB once per week)  -Continue to monitor BP at home.  -Recommend 2 baby ASA  -Continue procardia 60 BID--send BP's Friday and may need to add labetalol.    -Delivery 36-37 weeks.      Follow-up: weekly    Thank you for the consult and opportunity to care for this patient.  Please feel free to reach out with any questions or concerns.      I spent 27 minutes caring for this patient on this date of service. This time includes time spent by me in the following activities: preparing for the visit, reviewing tests, obtaining and/or reviewing a separately obtained history, performing a medically appropriate examination and/or evaluation, counseling and educating the patient/family/caregiver and independently interpreting results and communicating that information with the patient/family/caregiver with greater than 50% spent in counseling and coordination of care.     7 minutes reading US.      Tamika Humphreys MD FACOG  Maternal Fetal Medicine-[facility]  Office: [phone]  [email or web address]

## 2024-05-15 NOTE — PROGRESS NOTES
Pt reports that she is doing well and denies vaginal bleeding, cramping, contractions or LOF at this time. Reports active fetal movement x2. Reviewed when to call OB office or present to L&D for evaluation with symptoms such as decreased fetal movement, vaginal bleeding, LOF or ctxs. Pt verbalized understanding. Denies HA, visual changes or epigastric pain. Notes light swelling in her feet since Procardia was adjusted to 60mg BID on 5/13. Reviewed BPs taken in office and BP logs brought in by patient with Dr. Humphreys. Will review blood pressure logs again on 5/17. Next OB appointment scheduled for 5/20.    Vitals:    05/15/24 0935   BP: 140/92   Pulse: 84   Temp: 98.7 °F (37.1 °C)   SpO2: 99%

## 2024-05-16 ENCOUNTER — TELEPHONE (OUTPATIENT)
Dept: DIABETES SERVICES | Facility: HOSPITAL | Age: 34
End: 2024-05-16
Payer: MEDICAID

## 2024-05-17 ENCOUNTER — TELEPHONE (OUTPATIENT)
Dept: OBSTETRICS AND GYNECOLOGY | Facility: CLINIC | Age: 34
End: 2024-05-17
Payer: MEDICAID

## 2024-05-17 RX ORDER — LABETALOL 200 MG/1
200 TABLET, FILM COATED ORAL 3 TIMES DAILY
Qty: 90 TABLET | Refills: 5 | Status: SHIPPED | OUTPATIENT
Start: 2024-05-17

## 2024-05-17 NOTE — TELEPHONE ENCOUNTER
Weekly MFM blood pressure log evaluation. The following adjustments were made:    Continue Procardia 60mg BID  Add in Labetalol 200mg TID (prescription sent to pharmacy)    Call placed to patient to review these recommendations but no answer at this time. Voicemail left and MyChart message sent. Encouraged patient to present to L&D for further evaluation of blood pressures with values of >160 systolic and/or >110 diastolic per Dr. Humphreys. Next MFM appointment scheduled for 5/22.

## 2024-05-19 ENCOUNTER — HOSPITAL ENCOUNTER (OUTPATIENT)
Facility: HOSPITAL | Age: 34
Discharge: OTHER INSTITUTION | End: 2024-05-19
Attending: OBSTETRICS & GYNECOLOGY | Admitting: OBSTETRICS & GYNECOLOGY
Payer: COMMERCIAL

## 2024-05-19 VITALS
DIASTOLIC BLOOD PRESSURE: 94 MMHG | RESPIRATION RATE: 14 BRPM | OXYGEN SATURATION: 98 % | SYSTOLIC BLOOD PRESSURE: 142 MMHG | HEART RATE: 116 BPM | TEMPERATURE: 98.4 F

## 2024-05-19 PROBLEM — Z34.90 PREGNANCY: Status: ACTIVE | Noted: 2024-05-19

## 2024-05-19 LAB
ALBUMIN SERPL-MCNC: 3.4 G/DL (ref 3.5–5.2)
ALBUMIN/GLOB SERPL: 1.2 G/DL
ALP SERPL-CCNC: 107 U/L (ref 39–117)
ALT SERPL W P-5'-P-CCNC: 35 U/L (ref 1–33)
AMPHET+METHAMPHET UR QL: NEGATIVE
AMPHETAMINES UR QL: NEGATIVE
ANION GAP SERPL CALCULATED.3IONS-SCNC: 12.6 MMOL/L (ref 5–15)
AST SERPL-CCNC: 38 U/L (ref 1–32)
BACTERIA UR QL AUTO: ABNORMAL /HPF
BARBITURATES UR QL SCN: NEGATIVE
BASOPHILS # BLD AUTO: 0.08 10*3/MM3 (ref 0–0.2)
BASOPHILS NFR BLD AUTO: 0.6 % (ref 0–1.5)
BENZODIAZ UR QL SCN: NEGATIVE
BILIRUB SERPL-MCNC: 0.2 MG/DL (ref 0–1.2)
BILIRUB UR QL STRIP: NEGATIVE
BUN SERPL-MCNC: 15 MG/DL (ref 6–20)
BUN/CREAT SERPL: 28.3 (ref 7–25)
BUPRENORPHINE SERPL-MCNC: NEGATIVE NG/ML
CALCIUM SPEC-SCNC: 9.1 MG/DL (ref 8.6–10.5)
CANNABINOIDS SERPL QL: NEGATIVE
CHLORIDE SERPL-SCNC: 103 MMOL/L (ref 98–107)
CLARITY UR: CLEAR
CO2 SERPL-SCNC: 20.4 MMOL/L (ref 22–29)
COCAINE UR QL: NEGATIVE
COLOR UR: YELLOW
CREAT SERPL-MCNC: 0.53 MG/DL (ref 0.57–1)
CREAT UR-MCNC: 237 MG/DL
DEPRECATED RDW RBC AUTO: 41.6 FL (ref 37–54)
EGFRCR SERPLBLD CKD-EPI 2021: 124.6 ML/MIN/1.73
EOSINOPHIL # BLD AUTO: 0.25 10*3/MM3 (ref 0–0.4)
EOSINOPHIL NFR BLD AUTO: 1.9 % (ref 0.3–6.2)
ERYTHROCYTE [DISTWIDTH] IN BLOOD BY AUTOMATED COUNT: 13.1 % (ref 12.3–15.4)
GLOBULIN UR ELPH-MCNC: 2.9 GM/DL
GLUCOSE SERPL-MCNC: 93 MG/DL (ref 65–99)
GLUCOSE UR STRIP-MCNC: NEGATIVE MG/DL
HCT VFR BLD AUTO: 40 % (ref 34–46.6)
HGB BLD-MCNC: 13.8 G/DL (ref 12–15.9)
HGB UR QL STRIP.AUTO: NEGATIVE
HYALINE CASTS UR QL AUTO: ABNORMAL /LPF
IMM GRANULOCYTES # BLD AUTO: 0.09 10*3/MM3 (ref 0–0.05)
IMM GRANULOCYTES NFR BLD AUTO: 0.7 % (ref 0–0.5)
KETONES UR QL STRIP: NEGATIVE
LEUKOCYTE ESTERASE UR QL STRIP.AUTO: NEGATIVE
LYMPHOCYTES # BLD AUTO: 1.58 10*3/MM3 (ref 0.7–3.1)
LYMPHOCYTES NFR BLD AUTO: 12 % (ref 19.6–45.3)
MCH RBC QN AUTO: 30.1 PG (ref 26.6–33)
MCHC RBC AUTO-ENTMCNC: 34.5 G/DL (ref 31.5–35.7)
MCV RBC AUTO: 87.3 FL (ref 79–97)
METHADONE UR QL SCN: NEGATIVE
MONOCYTES # BLD AUTO: 0.66 10*3/MM3 (ref 0.1–0.9)
MONOCYTES NFR BLD AUTO: 5 % (ref 5–12)
NEUTROPHILS NFR BLD AUTO: 10.46 10*3/MM3 (ref 1.7–7)
NEUTROPHILS NFR BLD AUTO: 79.8 % (ref 42.7–76)
NITRITE UR QL STRIP: NEGATIVE
NRBC BLD AUTO-RTO: 0 /100 WBC (ref 0–0.2)
OPIATES UR QL: NEGATIVE
OXYCODONE UR QL SCN: NEGATIVE
PCP UR QL SCN: NEGATIVE
PH UR STRIP.AUTO: 6 [PH] (ref 4.5–8)
PLATELET # BLD AUTO: 222 10*3/MM3 (ref 140–450)
PMV BLD AUTO: 11.1 FL (ref 6–12)
POTASSIUM SERPL-SCNC: 4.2 MMOL/L (ref 3.5–5.2)
PROT ?TM UR-MCNC: 260 MG/DL
PROT SERPL-MCNC: 6.3 G/DL (ref 6–8.5)
PROT UR QL STRIP: ABNORMAL
PROT/CREAT UR: 1097 MG/G CREA (ref 0–200)
RBC # BLD AUTO: 4.58 10*6/MM3 (ref 3.77–5.28)
RBC # UR STRIP: ABNORMAL /HPF
REF LAB TEST METHOD: ABNORMAL
SODIUM SERPL-SCNC: 136 MMOL/L (ref 136–145)
SP GR UR STRIP: 1.02 (ref 1–1.03)
SQUAMOUS #/AREA URNS HPF: ABNORMAL /HPF
TRICYCLICS UR QL SCN: NEGATIVE
URATE SERPL-MCNC: 5.8 MG/DL (ref 2.4–5.7)
UROBILINOGEN UR QL STRIP: ABNORMAL
WBC # UR STRIP: ABNORMAL /HPF
WBC NRBC COR # BLD AUTO: 13.12 10*3/MM3 (ref 3.4–10.8)

## 2024-05-19 PROCEDURE — 96372 THER/PROPH/DIAG INJ SC/IM: CPT

## 2024-05-19 PROCEDURE — G0463 HOSPITAL OUTPT CLINIC VISIT: HCPCS

## 2024-05-19 PROCEDURE — 80053 COMPREHEN METABOLIC PANEL: CPT | Performed by: OBSTETRICS & GYNECOLOGY

## 2024-05-19 PROCEDURE — 25010000002 HYDRALAZINE PER 20 MG

## 2024-05-19 PROCEDURE — 25810000003 LACTATED RINGERS PER 1000 ML: Performed by: OBSTETRICS & GYNECOLOGY

## 2024-05-19 PROCEDURE — 84156 ASSAY OF PROTEIN URINE: CPT | Performed by: OBSTETRICS & GYNECOLOGY

## 2024-05-19 PROCEDURE — 80306 DRUG TEST PRSMV INSTRMNT: CPT | Performed by: OBSTETRICS & GYNECOLOGY

## 2024-05-19 PROCEDURE — 84550 ASSAY OF BLOOD/URIC ACID: CPT | Performed by: OBSTETRICS & GYNECOLOGY

## 2024-05-19 PROCEDURE — 59025 FETAL NON-STRESS TEST: CPT

## 2024-05-19 PROCEDURE — 81001 URINALYSIS AUTO W/SCOPE: CPT | Performed by: OBSTETRICS & GYNECOLOGY

## 2024-05-19 PROCEDURE — 87086 URINE CULTURE/COLONY COUNT: CPT | Performed by: OBSTETRICS & GYNECOLOGY

## 2024-05-19 PROCEDURE — 96374 THER/PROPH/DIAG INJ IV PUSH: CPT

## 2024-05-19 PROCEDURE — 25010000002 BETAMETHASONE ACET & SOD PHOS PER 4 MG

## 2024-05-19 PROCEDURE — 85025 COMPLETE CBC W/AUTO DIFF WBC: CPT | Performed by: OBSTETRICS & GYNECOLOGY

## 2024-05-19 PROCEDURE — 82570 ASSAY OF URINE CREATININE: CPT | Performed by: OBSTETRICS & GYNECOLOGY

## 2024-05-19 RX ORDER — MAGNESIUM SULFATE HEPTAHYDRATE 40 MG/ML
2 INJECTION, SOLUTION INTRAVENOUS CONTINUOUS
Status: DISCONTINUED | OUTPATIENT
Start: 2024-05-19 | End: 2024-05-19 | Stop reason: HOSPADM

## 2024-05-19 RX ORDER — HYDRALAZINE HYDROCHLORIDE 20 MG/ML
INJECTION INTRAMUSCULAR; INTRAVENOUS
Status: COMPLETED
Start: 2024-05-19 | End: 2024-05-19

## 2024-05-19 RX ORDER — SODIUM CHLORIDE, SODIUM LACTATE, POTASSIUM CHLORIDE, CALCIUM CHLORIDE 600; 310; 30; 20 MG/100ML; MG/100ML; MG/100ML; MG/100ML
50 INJECTION, SOLUTION INTRAVENOUS CONTINUOUS
Status: DISCONTINUED | OUTPATIENT
Start: 2024-05-19 | End: 2024-05-19 | Stop reason: HOSPADM

## 2024-05-19 RX ORDER — BETAMETHASONE SODIUM PHOSPHATE AND BETAMETHASONE ACETATE 3; 3 MG/ML; MG/ML
INJECTION, SUSPENSION INTRA-ARTICULAR; INTRALESIONAL; INTRAMUSCULAR; SOFT TISSUE
Status: COMPLETED
Start: 2024-05-19 | End: 2024-05-19

## 2024-05-19 RX ORDER — HYDRALAZINE HYDROCHLORIDE 20 MG/ML
10 INJECTION INTRAMUSCULAR; INTRAVENOUS ONCE
Status: COMPLETED | OUTPATIENT
Start: 2024-05-19 | End: 2024-05-19

## 2024-05-19 RX ORDER — LABETALOL 100 MG/1
200 TABLET, FILM COATED ORAL 3 TIMES DAILY
Status: DISCONTINUED | OUTPATIENT
Start: 2024-05-19 | End: 2024-05-19 | Stop reason: HOSPADM

## 2024-05-19 RX ORDER — BETAMETHASONE SODIUM PHOSPHATE AND BETAMETHASONE ACETATE 3; 3 MG/ML; MG/ML
12 INJECTION, SUSPENSION INTRA-ARTICULAR; INTRALESIONAL; INTRAMUSCULAR; SOFT TISSUE EVERY 24 HOURS
Status: DISCONTINUED | OUTPATIENT
Start: 2024-05-19 | End: 2024-05-19 | Stop reason: HOSPADM

## 2024-05-19 RX ADMIN — SODIUM CHLORIDE, POTASSIUM CHLORIDE, SODIUM LACTATE AND CALCIUM CHLORIDE 50 ML/HR: 600; 310; 30; 20 INJECTION, SOLUTION INTRAVENOUS at 11:05

## 2024-05-19 RX ADMIN — HYDRALAZINE HYDROCHLORIDE 10 MG: 20 INJECTION INTRAMUSCULAR; INTRAVENOUS at 11:06

## 2024-05-19 RX ADMIN — BETAMETHASONE SODIUM PHOSPHATE AND BETAMETHASONE ACETATE 12 MG: 3; 3 INJECTION, SUSPENSION INTRA-ARTICULAR; INTRALESIONAL; INTRAMUSCULAR at 10:53

## 2024-05-19 RX ADMIN — LABETALOL HYDROCHLORIDE 200 MG: 100 TABLET, FILM COATED ORAL at 10:20

## 2024-05-19 RX ADMIN — BETAMETHASONE SODIUM PHOSPHATE AND BETAMETHASONE ACETATE 12 MG: 3; 3 INJECTION, SUSPENSION INTRA-ARTICULAR; INTRALESIONAL; INTRAMUSCULAR; SOFT TISSUE at 10:53

## 2024-05-19 NOTE — NURSING NOTE
Spoke w/ Dr Martin on phone - updates given on BP, reactive NST, labs.  Decision made to [facility] to [facility]  Pt agreeable.  Orders given for MgsO4, betamethasone, 10mg IV hydralazine.  Awaiting call back from Dr Martin for accepting physician.

## 2024-05-19 NOTE — NURSING NOTE
Pt presents to triage w/ spouse stating that she checked her BP at home this morning and it was 170s/100.  Pt denies H/A, visual changes, epigastric pain.  Pt states she has chronic HTN and takes procardia 60mg BID and took it an hour ago.  Pt states she is supposed to start labatelol 200mg BID but hasn't picked up from pharmacy yet.  Pt denies vag bleeding, ctx, SROM and is feeling her babies move well.      Dr Martin called w/ elevated BP update, reactive NST.  Orders given for labs, saline lock, cycle BPs.

## 2024-05-19 NOTE — NURSING NOTE
[facility] here for transfer.  Pt transferred via stretcher w/ RN to accompany pt in ambulance.

## 2024-05-19 NOTE — NURSING NOTE
Spoke w/ Dr Martin - obtained accepting physician from [facility].  Update given - mag sulfate started, hydralazine and BMZ given, pina cath placed.    [facility] called for transfer - en route.  Spoke w/ Elina at [facility] L&D - report given.

## 2024-05-20 LAB — BACTERIA SPEC AEROBE CULT: NORMAL

## 2024-06-06 DIAGNOSIS — R45.7 EMOTIONAL STRESS: ICD-10-CM

## 2024-06-06 RX ORDER — SERTRALINE HYDROCHLORIDE 25 MG/1
25 TABLET, FILM COATED ORAL DAILY
Qty: 90 TABLET | Refills: 1 | Status: SHIPPED | OUTPATIENT
Start: 2024-06-06

## 2024-06-26 NOTE — PROGRESS NOTES
OB follow up     Paola Neves is a 34 y.o.  36w5d being seen today for her obstetrical visit.  Patient reports no bleeding, no contractions, and no leaking. Fetal movement: normal.  Here for biophysical profile.    Review of Systems  No bleeding, No cramping/contractions     /100   Wt 133 kg (294 lb 3.2 oz)   BMI 47.49 kg/m²     FHT:   BPM   Uterine Size:     Twin A is in breech position, grade 2 placenta.  MVP 4 cm.  Heart rate 150 bpm.  BPP 8 out of 8    Twin B transverse position grade 2 placenta.  Mean vertical pocket 3.7 cm.  Heart rate is 141 bpm BPP is 8 out of 8.    Assessment/Plan:    1) 34 y.o.  -pregnancy at 36w5d    2)   Encounter Diagnoses   Name Primary?    GDM (gestational diabetes mellitus), class A1     Encounter for  screening, unspecified Yes    Dichorionic diamniotic twin pregnancy in third trimester     Pregnancy resulting from in vitro fertilization, antepartum- normal fetal echo, no further f/u needed    Reassuring  testing.  Continue weekly testing per [facility] guidelines.    3) Reviewed this stage of pregnancy  4) Problem list updated     Return in about 1 week (around 2024) for BPP, OB Anil.    I spent 20 minutes caring for Paola on this date of service. This time includes time spent by me in the following activities: preparing for the visit, reviewing tests, performing a medically appropriate examination and/or evaluation, counseling and educating the patient/family/caregiver, documenting information in the medical record, independently interpreting results and communicating that information with the patient/family/caregiver, and ordering test(s).      Kings Kaminski MD    2024  19:10 EDT

## 2024-08-09 DIAGNOSIS — J45.30 MILD PERSISTENT ASTHMA WITHOUT COMPLICATION: ICD-10-CM

## 2024-08-09 RX ORDER — BECLOMETHASONE DIPROPIONATE HFA 40 UG/1
2 AEROSOL, METERED RESPIRATORY (INHALATION) 2 TIMES DAILY
Qty: 10.6 EACH | Refills: 5 | Status: SHIPPED | OUTPATIENT
Start: 2024-08-09

## 2024-08-20 ENCOUNTER — HOSPITAL ENCOUNTER (EMERGENCY)
Facility: HOSPITAL | Age: 34
Discharge: HOME | End: 2024-08-20
Attending: EMERGENCY MEDICINE
Payer: COMMERCIAL

## 2024-08-20 ENCOUNTER — APPOINTMENT (OUTPATIENT)
Dept: GENERAL RADIOLOGY | Facility: HOSPITAL | Age: 34
End: 2024-08-20
Payer: COMMERCIAL

## 2024-08-20 VITALS
BODY MASS INDEX: 41.78 KG/M2 | WEIGHT: 260 LBS | OXYGEN SATURATION: 99 % | SYSTOLIC BLOOD PRESSURE: 162 MMHG | HEART RATE: 92 BPM | TEMPERATURE: 98.2 F | DIASTOLIC BLOOD PRESSURE: 84 MMHG | HEIGHT: 66 IN | RESPIRATION RATE: 18 BRPM

## 2024-08-20 DIAGNOSIS — S82.831A: Primary | ICD-10-CM

## 2024-08-20 PROCEDURE — 73610 X-RAY EXAM OF ANKLE: CPT

## 2024-08-20 PROCEDURE — 73560 X-RAY EXAM OF KNEE 1 OR 2: CPT

## 2024-08-20 PROCEDURE — 99283 EMERGENCY DEPT VISIT LOW MDM: CPT

## 2024-08-20 RX ORDER — HYDROCODONE BITARTRATE AND ACETAMINOPHEN 5; 325 MG/1; MG/1
1 TABLET ORAL EVERY 6 HOURS PRN
Status: DISCONTINUED | OUTPATIENT
Start: 2024-08-20 | End: 2024-08-20 | Stop reason: HOSPADM

## 2024-08-20 RX ORDER — HYDROCODONE BITARTRATE AND ACETAMINOPHEN 5; 325 MG/1; MG/1
1 TABLET ORAL EVERY 6 HOURS PRN
Qty: 8 TABLET | Refills: 0 | Status: SHIPPED | OUTPATIENT
Start: 2024-08-20

## 2024-08-20 RX ADMIN — HYDROCODONE BITARTRATE AND ACETAMINOPHEN 1 TABLET: 5; 325 TABLET ORAL at 21:01

## 2024-08-21 NOTE — ED PROVIDER NOTES
EMERGENCY DEPARTMENT ENCOUNTER  Room Number:  36/36  PCP: Luis Armando Kidd MD  Independent Historians: Patient      HPI:  Chief Complaint: had concerns including Knee Pain and Ankle Injury.     A complete HPI/ROS/PMH/PSH/SH/FH are unobtainable due to: None    Chronic or social conditions impacting patient care (Social Determinants of Health): None      Context: Paola Neves is a 34 y.o. female with a medical history of hypertension, asthma, and obesity presents emergency department with an injury to her right knee after twisting at the need to alicja her dog while her foot was planted on the ground.  She reports feeling a pop in the lateral aspect of the knee.  She reports pain with straightening of the knee.  She denies numbness or tingling in the extremity.  She has not been able to bear weight since this occurred.  She denies any prior knee surgeries.  She does report some mild pain to the right ankle but denies any other injuries associated with the fall.      Review of prior external notes (non-ED) -and- Review of prior external test results outside of this encounter:   I reviewed labs from 5/19/2024, hemoglobin 13.8, creatinine 0.53      PAST MEDICAL HISTORY  Active Ambulatory Problems     Diagnosis Date Noted    Mild persistent asthma 03/11/2016    HTN (hypertension)-see MFM note 04/30/2017    Class 3 severe obesity due to excess calories with serious comorbidity and body mass index (BMI) of 45.0 to 49.9 in adult 03/12/2021    Alopecia areata 06/05/2023    Dichorionic diamniotic twin pregnancy, antepartum 01/10/2024    Pregnancy resulting from in vitro fertilization, antepartum- normal fetal echo, no further f/u needed 01/30/2024    Rubella non-immune status, antepartum 01/31/2024    Anxiety and depression 03/05/2024    IUGR (intrauterine growth restriction) affecting care of mother, second trimester, fetus 2- twin B FGR 04/18/2024    GDM (gestational diabetes mellitus), class A1 05/01/2024     Pregnancy 05/19/2024     Resolved Ambulatory Problems     Diagnosis Date Noted    Environmental allergies 03/11/2016    Right knee meniscal tear 05/26/2016    Chondromalacia of right patella 08/07/2016    Sore throat 12/17/2016    Fever 12/17/2016    Exacerbation of asthma 09/27/2015    Candidiasis of mouth 10/13/2015    Routine adult health maintenance 02/24/2017    Weight gain due to medication 02/24/2017    LGSIL on Pap smear of cervix 05/31/2017    IUD check up 06/08/2017    Breast mass 11/03/2017    Oral contraception initiation 11/03/2017    Flank pain 11/03/2017    Emotional stress 03/12/2021    Infertility, female 04/16/2021    Abnormal menstrual cycle 10/05/2022    Maternal obesity affecting pregnancy, antepartum 01/10/2024    Nausea and vomiting during pregnancy prior to 22 weeks gestation 01/30/2024     Past Medical History:   Diagnosis Date    Abnormal Pap smear of cervix     Alopecia     Asthma May 2007    Female infertility     GDM (gestational diabetes mellitus)     Hypertension May 2016    Ovarian cyst          PAST SURGICAL HISTORY  Past Surgical History:   Procedure Laterality Date    FERTILITY SURGERY  05/02/2022    IVF    WISDOM TOOTH EXTRACTION  04/2017         FAMILY HISTORY  Family History   Problem Relation Age of Onset    Thyroid disease Mother         Graves    Ulcerative colitis Mother     Hypertension Father     Hearing loss Father     Cancer Maternal Grandmother     Asthma Paternal Grandmother     Hearing loss Paternal Grandmother     Heart disease Paternal Grandfather     Diabetes Paternal Grandfather     Cancer Paternal Grandfather     No Known Problems Sister     Ulcerative colitis Brother     Breast cancer Neg Hx     Ovarian cancer Neg Hx     Uterine cancer Neg Hx     Colon cancer Neg Hx     Pulmonary embolism Neg Hx     Deep vein thrombosis Neg Hx          SOCIAL HISTORY  Social History     Socioeconomic History    Marital status:     Number of children: 0    Years of  education: assoc degress   Tobacco Use    Smoking status: Never     Passive exposure: Never    Smokeless tobacco: Never   Vaping Use    Vaping status: Never Used   Substance and Sexual Activity    Alcohol use: Not Currently     Alcohol/week: 1.0 standard drink of alcohol     Types: 1 Glasses of wine per week     Comment: 1 a month    Drug use: No    Sexual activity: Yes     Partners: Male     Birth control/protection: None         ALLERGIES  Penicillins and Sulfa antibiotics      REVIEW OF SYSTEMS  Included in HPI  All systems reviewed and negative except for those discussed in HPI.      PHYSICAL EXAM    I have reviewed the triage vital signs and nursing notes.    ED Triage Vitals   Temp Heart Rate Resp BP SpO2   08/20/24 1944 08/20/24 1943 08/20/24 1943 08/20/24 1943 08/20/24 1943   98.2 °F (36.8 °C) 92 18 162/84 99 %      Temp src Heart Rate Source Patient Position BP Location FiO2 (%)   08/20/24 1944 -- -- -- --   Oral           Physical Exam  Constitutional:       General: She is not in acute distress.     Appearance: Normal appearance. She is obese.   HENT:      Head: Normocephalic and atraumatic.      Nose: Nose normal.      Mouth/Throat:      Mouth: Mucous membranes are moist.   Eyes:      Extraocular Movements: Extraocular movements intact.      Pupils: Pupils are equal, round, and reactive to light.   Cardiovascular:      Rate and Rhythm: Normal rate and regular rhythm.      Pulses: Normal pulses.      Heart sounds: Normal heart sounds.   Pulmonary:      Effort: Pulmonary effort is normal. No respiratory distress.      Breath sounds: Normal breath sounds.   Musculoskeletal:         General: Normal range of motion.      Cervical back: Normal range of motion and neck supple.      Comments: No obvious swelling or deformity to the right knee.  There is tenderness to palpation in the lateral joint line.  Range of motion intact with flexion but limited with extension secondary to pain.  Sensation is intact to  light touch throughout the bilateral lower extremities. Muscle strength is 5/5 and symmetrical with plantarflexion and EHL. Patellar reflexes are 2+ and equal bilaterally. DP and PT pulses are 2+ bilaterally.      Skin:     General: Skin is warm and dry.      Capillary Refill: Capillary refill takes less than 2 seconds.   Neurological:      General: No focal deficit present.      Mental Status: She is alert and oriented to person, place, and time.   Psychiatric:         Mood and Affect: Mood normal.         Behavior: Behavior normal.           RADIOLOGY  XR Ankle 3+ View Right, XR Knee 1 or 2 View Right    Result Date: 8/20/2024  XR KNEE 1 OR 2 VW RIGHT-, XR ANKLE 3+ VW RIGHT-  INDICATIONS: Trauma.  TECHNIQUE: 3 VIEWS OF THE RIGHT KNEE, 3 VIEWS OF THE RIGHT ANKLE  COMPARISON: None available  FINDINGS:  Right knee: On the frontal view, there appears to be a nondisplaced fracture at the proximal fibular metaphysis, with about 2 mm separation of fracture fragments, correlate with location of pain. No other evidence of fracture is identified. Minimal knee effusion is suggested. Minimal degenerative spurring.  Right ankle: No acute fracture, erosion, or dislocation is identified. Ankle mortise appears preserved. Mild calcaneal spurring is apparent. Follow-up/further evaluation can be obtained as indications persist.       As described.    This report was finalized on 8/20/2024 8:43 PM by Dr. Kenji Rg M.D on Workstation: [id number]         MEDICATIONS GIVEN IN ER  Medications   HYDROcodone-acetaminophen (NORCO) 5-325 MG per tablet 1 tablet (1 tablet Oral Given 8/20/24 2101)           OUTPATIENT MEDICATION MANAGEMENT:  Current Facility-Administered Medications Ordered in Epic   Medication Dose Route Frequency Provider Last Rate Last Admin    HYDROcodone-acetaminophen (NORCO) 5-325 MG per tablet 1 tablet  1 tablet Oral Q6H PRN Gilbert Antunez MD   1 tablet at 08/20/24 2101     Current Outpatient Medications  Ordered in [facility]   Medication Sig Dispense Refill    albuterol sulfate  (90 Base) MCG/ACT inhaler Inhale 2 puffs Every 4 (Four) Hours As Needed for Wheezing. 18 g 1    Beclomethasone Diprop HFA (Qvar RediHaler) 40 MCG/ACT inhaler INHALE 2 PUFFS BY MOUTH TWICE A DAY 10.6 each 5    cetirizine (ZyrTEC) 10 MG tablet Take 1 tablet by mouth Daily.      famotidine (Pepcid) 20 MG tablet Take 1 tablet by mouth 2 (Two) Times a Day. 60 tablet 3    glucose blood test strip Check blood sugars 4 times daily 120 each 6    glucose monitor monitoring kit Check blood sugar 4 times daily 1 each 1    HYDROcodone-acetaminophen (NORCO) 5-325 MG per tablet Take 1 tablet by mouth Every 6 (Six) Hours As Needed for Moderate Pain. 8 tablet 0    labetalol (NORMODYNE) 200 MG tablet Take 1 tablet by mouth 3 times a day. Take 200mg tablet 3 times a day. 90 tablet 5    Lancets misc Check blood sugar 4 times daily 120 each 6    metFORMIN ER (GLUCOPHAGE-XR) 750 MG 24 hr tablet Take 1 tablet by mouth Daily With Breakfast. 30 tablet 11    NIFEdipine XL (Procardia XL) 60 MG 24 hr tablet Take 1 tablet by mouth 2 (Two) Times a Day. 90 tablet 10    ondansetron ODT (ZOFRAN-ODT) 4 MG disintegrating tablet Place 1 tablet on the tongue Every 8 (Eight) Hours As Needed for Nausea or Vomiting. 30 tablet 2    sertraline (ZOLOFT) 25 MG tablet TAKE 1 TABLET BY MOUTH DAILY 90 tablet 1             PROGRESS, DATA ANALYSIS, CONSULTS, AND MEDICAL DECISION MAKING  ORDERS PLACED DURING THIS VISIT:  Orders Placed This Encounter   Procedures    [facility] 11.  Crutches, 05.  Knee Immobilizer; Prevents Accomplishing MRADLs; Able to Safely Use Equipment; Mobility Deficit Can Be Sufficiently Resolved By Use of Equipment    XR Ankle 3+ View Right    XR Knee 1 or 2 View Right       All labs have been independently interpreted by me.  All radiology studies have been reviewed by me. All EKG's have been independently viewed and interpreted by me.  Discussion  below represents my analysis of pertinent findings related to patient's condition, differential diagnosis, treatment plan and final disposition.    Differential diagnosis includes but is not limited to:   My diagnosis for lower extremity pain and injury includes but is not limited to hip fracture, femur fracture, hip dislocation, hip contusion, hip sprain, hip strain, pelvic fracture, ischio-tibial band pain, ischio-tibial band bursitis, knee sprain, patella dislocation, knee dislocation, internal derangement of knee, fractures of the femur, tibia, fibula, ankle, foot and digits, ankle sprain, ankle dislocation, Lisfranc fracture, fracture dislocations of the digits, pulmonary embolism, claudication, peripheral vascular disease, gout, osteoarthritis, rheumatoid arthritis, bursitis, septic joint, poly-rheumatica, polyarthralgia and other inflammatory or infectious disease processes       ED Course:  ED Course as of 08/20/24 2144   Tue Aug 20, 2024   2045 XR Knee 1 or 2 View Right  My independent interpretation of the x-ray of the knee is nondisplaced fracture at the proximal fibula [CC]   2045 XR Ankle 3+ View Right  My independent interpretation of the x-ray is no acute fracture [CC]   2054 I discussed the case with Dr. Antunez and he agrees to evaluate the patient at the bedside.    [CC]   2055 I rechecked the patient.  I discussed the patient's radiology findings (including all incidental findings), diagnosis, and plan for discharge.  A repeat exam reveals no new worrisome changes from my initial exam findings.  The patient understands that the fact that they are being discharged does not denote that nothing is abnormal, it indicates that no clinical emergency is present and that they must follow-up as directed in order to properly maintain their health.  Follow-up instructions (specifically listed below) and return to ER precautions were given at this time.  I specifically instructed the patient to follow-up  with their PCP.  The patient understands and agrees with the plan, and is ready for discharge.  All questions answered.   [CC]      ED Course User Index  [CC] Bharati Barron PA-C           AS OF 21:44 EDT VITALS:    BP - 162/84  HR - 92  TEMP - 98.2 °F (36.8 °C) (Oral)  O2 SATS - 99%      MDM:  Patient is a 34-year-old female presents emergency department today with an injury to her right knee after a fall at home.  On arrival here in the emergency department vitals are reassuring, she is afebrile.  On my exam the patient is tender to palpation in the lateral joint line.  Patient was evaluated with x-ray which shows a nondisplaced fracture.   based on the mechanism of injury I would have concern for a ligamentous injury, specifically to the LCL.  Patient will be placed in a knee immobilizer and crutches with follow-up with orthopedics for possible MRI.  She was educated on nonweightbearing, elevating, using ice.  Will treat patient with hydrocodone.  She was educated on the use of narcotic pain medication and encouraged to supplement with ibuprofen and Tylenol for more mild to moderate pain.  Patient is propria for discharge with outpatient follow-up.        DIAGNOSIS  Final diagnoses:   Closed traumatic nondisplaced fracture of proximal end of right fibula, initial encounter         DISPOSITION  ED Disposition       ED Disposition   Discharge    Condition   Stable    Comment   --                    Please note that portions of this document were completed with a voice recognition program.    Note Disclaimer: At [facility], we believe that sharing information builds trust and better relationships. You are receiving this note because you recently visited [facility]. It is possible you will see health information before a provider has talked with you about it. This kind of information can be easy to misunderstand. To help you fully understand what it means for your health, we urge you to discuss this note  with your provider.     Bharati Barron PA-C  08/20/24 [id number]

## 2024-08-21 NOTE — DISCHARGE INSTRUCTIONS
Rest, ice, elevate. Wear the knee immobilizer until you follow up with orthopedics.  Limit weight-bearing.  Recheck with orthopaedist of your choice or the one listed above.  Return to the ER as needed.

## 2024-08-21 NOTE — ED PROVIDER NOTES
MD ATTESTATION NOTE    The [facility] and I have discussed this patient's history, physical exam, and treatment plan.  I have reviewed the documentation and personally had a face to face interaction with the patient. I affirm the documentation and agree with the treatment and plan.  The attached note describes my personal findings.        SHARED APC FACE TO FACE: I performed a substantive part of the MDM during the patient's E/M visit. I personally evaluated and examined the patient. I personally made or approved the documented management plan and acknowledge its risk of complications.        Brief HPI: Patient presents for evaluation of fall.  Patient states pacing her dogs and fell and felt a pop in her right knee.  Has lateral right knee pain.  Has some mild right ankle pain.    PHYSICAL EXAM  ED Triage Vitals   Temp Heart Rate Resp BP SpO2   08/20/24 1944 08/20/24 1943 08/20/24 1943 08/20/24 1943 08/20/24 1943   98.2 °F (36.8 °C) 92 18 162/84 99 %      Temp src Heart Rate Source Patient Position BP Location FiO2 (%)   08/20/24 1944 -- -- -- --   Oral             GENERAL: no acute distress  HENT: nares patent  EYES: no scleral icterus  RESPIRATORY: normal effort  MUSCULOSKELETAL: no deformity.  Tenderness to right lateral knee  NEURO: alert, moves all extremities, follows commands  PSYCH:  calm, cooperative  SKIN: warm, dry    Vital signs and nursing notes reviewed.    Independent interpretation of right knee shows proximal fibular fracture    ED Course as of 08/20/24 2319   Tue Aug 20, 2024   2045 XR Knee 1 or 2 View Right  My independent interpretation of the x-ray of the knee is nondisplaced fracture at the proximal fibula [CC]   2045 XR Ankle 3+ View Right  My independent interpretation of the x-ray is no acute fracture [CC]   2054 I discussed the case with Dr. Antunez and he agrees to evaluate the patient at the bedside.    [CC]   2055 I rechecked the patient.  I discussed the patient's radiology findings  (including all incidental findings), diagnosis, and plan for discharge.  A repeat exam reveals no new worrisome changes from my initial exam findings.  The patient understands that the fact that they are being discharged does not denote that nothing is abnormal, it indicates that no clinical emergency is present and that they must follow-up as directed in order to properly maintain their health.  Follow-up instructions (specifically listed below) and return to ER precautions were given at this time.  I specifically instructed the patient to follow-up with their PCP.  The patient understands and agrees with the plan, and is ready for discharge.  All questions answered.   [CC]      ED Course User Index  [CC] Bharati Barron, PAStanleyC         Plan: discharge       Gilbert Antunez MD  08/20/24 [id number]

## 2024-08-22 ENCOUNTER — PATIENT ROUNDING (BHMG ONLY) (OUTPATIENT)
Dept: ORTHOPEDIC SURGERY | Facility: CLINIC | Age: 34
End: 2024-08-22
Payer: COMMERCIAL

## 2024-08-22 ENCOUNTER — OFFICE VISIT (OUTPATIENT)
Dept: ORTHOPEDIC SURGERY | Facility: CLINIC | Age: 34
End: 2024-08-22
Payer: COMMERCIAL

## 2024-08-22 VITALS — WEIGHT: 260.1 LBS | BODY MASS INDEX: 41.8 KG/M2 | HEIGHT: 66 IN | TEMPERATURE: 98.7 F

## 2024-08-22 DIAGNOSIS — S82.839A CLOSED FRACTURE OF PROXIMAL END OF FIBULA, UNSPECIFIED FRACTURE MORPHOLOGY, INITIAL ENCOUNTER: Primary | ICD-10-CM

## 2024-08-22 DIAGNOSIS — S89.91XA INJURY OF RIGHT KNEE, INITIAL ENCOUNTER: ICD-10-CM

## 2024-08-22 DIAGNOSIS — S89.91XD: ICD-10-CM

## 2024-08-22 NOTE — PROGRESS NOTES
[facility]              New Problem      Patient Name: Paola Neves  : 1990  Primary Care Physician: Luis Armando Kidd MD        Chief Complaint:  Right knee injury    HPI:   Paola Neves is a 34 y.o. year old who presents today for evaluation.  This is a 34-year-old female who is postpartum with twins.  She was holding her dog on the leash when the dog got away from her she planted her foot hyperextended her knee and had a severe painful pop with inability to bear weight immediately.  This injury occurred on Tuesday, she went to the emergency room and had imaging done of her knee and ankle.  She had a mildly displaced proximal fibula fracture there was concern for potential ligamentous injury.  Her ankle films were normal.  Initially her ankle was a little bit sore but is feeling better today.  She was prescribed narcotics and has been taking some of her oxycodone leftover from her  about every 6 hours.  She also started taking ibuprofen last night.  She has been nonweightbearing in an immobilizer and icing the knee frequently.  Most of her pain is anterior lateral she has swelling and she still not quite able to bear any weight on the joint.      Past Medical/Surgical, Social and Family History:  I have reviewed and/or updated pertinent history as noted in the medical record including:  Past Medical History:   Diagnosis Date    Abnormal Pap smear of cervix     2017 LGSIL/CIERA 1, 2017 normal    Alopecia     Ankle sprain 2004    Anxiety and depression 2024    Asthma May 2007    Female infertility     Fracture of wrist     Right wrist    GDM (gestational diabetes mellitus)     Hypertension May 2016    Ovarian cyst     Rotator cuff syndrome Fall     Left shoulder    Tennis elbow      Past Surgical History:   Procedure Laterality Date    FERTILITY SURGERY  2022    IVF    WISDOM TOOTH EXTRACTION  2017     Social History     Occupational History     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   Tobacco Use    Smoking status: Never     Passive exposure: Never    Smokeless tobacco: Never   Vaping Use    Vaping status: Never Used   Substance and Sexual Activity    Alcohol use: Not Currently     Alcohol/week: 1.0 standard drink of alcohol     Comment: 1 a month    Drug use: No    Sexual activity: Yes     Partners: Male     Birth control/protection: Birth control pill          Allergies:   Allergies   Allergen Reactions    Penicillins Hives     Broke out in a rash in kindergarden    Sulfa Antibiotics Hives     Hives in college age       Medications:   Home Medications:  Current Outpatient Medications on File Prior to Visit   Medication Sig    albuterol sulfate  (90 Base) MCG/ACT inhaler Inhale 2 puffs Every 4 (Four) Hours As Needed for Wheezing.    Beclomethasone Diprop HFA (Qvar RediHaler) 40 MCG/ACT inhaler INHALE 2 PUFFS BY MOUTH TWICE A DAY    cetirizine (ZyrTEC) 10 MG tablet Take 1 tablet by mouth Daily.    famotidine (Pepcid) 20 MG tablet Take 1 tablet by mouth 2 (Two) Times a Day.    HYDROcodone-acetaminophen (NORCO) 5-325 MG per tablet Take 1 tablet by mouth Every 6 (Six) Hours As Needed for Moderate Pain.    NIFEdipine XL (Procardia XL) 60 MG 24 hr tablet Take 1 tablet by mouth 2 (Two) Times a Day.    sertraline (ZOLOFT) 25 MG tablet TAKE 1 TABLET BY MOUTH DAILY    [DISCONTINUED] glucose blood test strip Check blood sugars 4 times daily    [DISCONTINUED] glucose monitor monitoring kit Check blood sugar 4 times daily    [DISCONTINUED] labetalol (NORMODYNE) 200 MG tablet Take 1 tablet by mouth 3 times a day. Take 200mg tablet 3 times a day.    [DISCONTINUED] Lancets misc Check blood sugar 4 times daily    [DISCONTINUED] metFORMIN ER (GLUCOPHAGE-XR) 750 MG 24 hr tablet Take 1 tablet by mouth Daily With Breakfast.    [DISCONTINUED] ondansetron ODT (ZOFRAN-ODT) 4 MG disintegrating tablet Place 1 tablet on the tongue Every 8 (Eight) Hours As Needed for Nausea or Vomiting.      No current facility-administered medications on file prior to visit.         ROS:  ROS negative except as listed in the HPI.    Physical Exam:   34 y.o. female  Body mass index is 41.98 kg/m²., 118 kg (260 lb 1.6 oz)  Vitals:    08/22/24 0919   Temp: 98.7 °F (37.1 °C)     General: Alert, cooperative, appears well and in no observable distress. Appears stated age and BMI as listed above.  HEENT: Normocephalic, atraumatic on external visual inspection.  CV: No significant peripheral edema.  Respiratory: Normal respiratory effort.  Skin: Warm & well perfused; appropriate skin turgor.  Psych: Appropriate mood & affect.  Neuro: Gross sensation and motor intact in affected extremity/extremities.  Vascular: Peripheral pulses palpable in affected extremity/extremities.     MSK Exam:  RIGHT Knee  No wounds, no gross deformity. 2+ effusion. Tenderness to palpation lateral compartment, fibular head ROM 0 to 20 passively with severe pain  Knee is stable to varus and valgus stress at 0 and 30 degrees but painful. Bounce Home + Negative anterior and posterior drawer Lachman- difficult to assess given lack of motion and pain.    Brief hip exam in the affected extremity(ies) grossly unremarkable. No groin pain elicited. Bilateral ankles with painless ROM, grossly symmetric. She has a stable right ankle exam with some mild pain during ER stress at the ankle and lateral knee. Calves soft and compressible, compartments non-tender in B/L lower extremities.       Radiology:    Images were taken prior to the visit today and were independently reviewed by me. There is a proximal fibula fracture on the AP view that appears mildly distracted.  She has some mild subtle degenerative changes in the knee but overall good maintenance of joint space no other obvious fracture.  I also reviewed her ankle x-rays that do not suggest any acute fracture or dislocation.    Procedure:   N/A      Misc. Data/Labs: N/A    Assessment & Plan:    ICD-10-CM  ICD-9-CM   1. Closed fracture of proximal end of fibula, unspecified fracture morphology, initial encounter  S82.839A 823.01   2. Injury of right knee, initial encounter  S89.91XA 959.7     No orders of the defined types were placed in this encounter.    Orders Placed This Encounter   Procedures    MRI Knee Right Without Contrast     This is a 34-year-old female with a right knee injury.  She has a mildly distracted proximal fibular fracture.  Her history and exam are concerning for ligamentous injury associated with the fracture pattern.  Consider meniscal pathology.  Plan is to proceed with an MRI to better evaluate and treat her condition in an effort to expedite her recovery.  I think her ankle exam is stable.  I am recommending she continue protected weightbearing/nonweightbearing with the crutches and continue in the knee immobilizer.  She will continue ibuprofen, she can continue taking the narcotics that were prescribed to her sparingly for breakthrough pain and continue icing.  Plan is to order a stat MRI and appropriate referrals based on the findings.    Continue with activity modifications as needed/discussed.  Continue ICE and/or HEAT PRN.  Recommend to continue activity as tolerated, focus on quad and hip/core strengthening as well as gentle stretching.  Recommend lowering or maintaining BMI within a healthy range to reduce symptoms.   Patient encouraged to call with questions or concerns prior to follow up.  Will discuss with attending as needed.   Consider additional referrals, work up and/or advanced imaging as indicated or if patient fails to respond to conservative care.    Return for follow up after the MRI.        GANGA Burns    Dictation software was used to complete a portion or all of this note.

## 2024-08-22 NOTE — PROGRESS NOTES
A [employer] Message has been sent to the patient for [place] [facility] [place]

## 2024-08-23 ENCOUNTER — TELEPHONE (OUTPATIENT)
Dept: ORTHOPEDIC SURGERY | Facility: CLINIC | Age: 34
End: 2024-08-23

## 2024-08-23 ENCOUNTER — TELEPHONE (OUTPATIENT)
Dept: ORTHOPEDIC SURGERY | Facility: CLINIC | Age: 34
End: 2024-08-23
Payer: COMMERCIAL

## 2024-08-23 NOTE — TELEPHONE ENCOUNTER
Left VM asking if pt could do an 8:10AM appt on 8/28/24 w/Dr. Washburn at [facility].   Instructed pt to return call to office.

## 2024-08-28 ENCOUNTER — OFFICE VISIT (OUTPATIENT)
Dept: ORTHOPEDIC SURGERY | Facility: CLINIC | Age: 34
End: 2024-08-28
Payer: COMMERCIAL

## 2024-08-28 VITALS — BODY MASS INDEX: 41.83 KG/M2 | TEMPERATURE: 98.6 F | HEIGHT: 66 IN | WEIGHT: 260.3 LBS

## 2024-08-28 DIAGNOSIS — S82.839A CLOSED FRACTURE OF PROXIMAL END OF FIBULA, UNSPECIFIED FRACTURE MORPHOLOGY, INITIAL ENCOUNTER: Primary | ICD-10-CM

## 2024-08-28 RX ORDER — OXYCODONE HYDROCHLORIDE 5 MG/1
5 TABLET ORAL
COMMUNITY
Start: 2024-05-23

## 2024-08-28 RX ORDER — IBUPROFEN 800 MG/1
TABLET, FILM COATED ORAL
COMMUNITY
Start: 2024-05-23

## 2024-08-28 RX ORDER — HYDROCODONE BITARTRATE AND ACETAMINOPHEN 5; 325 MG/1; MG/1
1 TABLET ORAL EVERY 6 HOURS PRN
Qty: 20 TABLET | Refills: 0 | Status: SHIPPED | OUTPATIENT
Start: 2024-08-28

## 2024-08-28 NOTE — PROGRESS NOTES
Patient: Paola Neves    YOB: 1990    Medical Record Number: [id number]    Chief Complaints: Referral for right knee injury    History of Present Illness:     34 y.o. female patient who presents for her right knee.  She was referred by Mamadou.  Patient tells me that her dog got loose and when she went to retrieve the dog, another dog came over and the dog pulled her on its leash.  When this happened, she hyperextended and twisted her right knee.  The injury was a little over a week ago.  She reports mild residual aching pain which is predominantly lateral sided.  She says her knee was fine prior to this incident.  She has been wearing a knee immobilizer and using crutches.  She has been taking hydrocodone that was given to her by the emergency room.  She is about out of that medicine but it does seem to help.    Allergies:   Allergies   Allergen Reactions    Penicillins Hives     Broke out in a rash in kindergarden    Sulfa Antibiotics Hives     Hives in college age       Home Medications:      Current Outpatient Medications:     Acetaminophen (Tylenol) 325 MG capsule, 650 mg., Disp: , Rfl:     albuterol sulfate  (90 Base) MCG/ACT inhaler, Inhale 2 puffs Every 4 (Four) Hours As Needed for Wheezing., Disp: 18 g, Rfl: 1    Beclomethasone Diprop HFA (Qvar RediHaler) 40 MCG/ACT inhaler, INHALE 2 PUFFS BY MOUTH TWICE A DAY, Disp: 10.6 each, Rfl: 5    cetirizine (ZyrTEC) 10 MG tablet, Take 1 tablet by mouth Daily., Disp: , Rfl:     HYDROcodone-acetaminophen (NORCO) 5-325 MG per tablet, Take 1 tablet by mouth Every 6 (Six) Hours As Needed for Moderate Pain., Disp: 8 tablet, Rfl: 0    ibuprofen (ADVIL,MOTRIN) 800 MG tablet, , Disp: , Rfl:     NIFEdipine XL (Procardia XL) 60 MG 24 hr tablet, Take 1 tablet by mouth 2 (Two) Times a Day., Disp: 90 tablet, Rfl: 10    oxyCODONE (ROXICODONE) 5 MG immediate release tablet, 1 tablet., Disp: , Rfl:     sertraline (ZOLOFT) 25 MG tablet, TAKE 1 TABLET BY  MOUTH DAILY, Disp: 90 tablet, Rfl: 1    famotidine (Pepcid) 20 MG tablet, Take 1 tablet by mouth 2 (Two) Times a Day. (Patient not taking: Reported on 8/28/2024), Disp: 60 tablet, Rfl: 3    Past Medical History:   Diagnosis Date    Abnormal Pap smear of cervix     6/2017 LGSIL/CIERA 1, 11/2017 normal    Alopecia     Ankle sprain 2004    Anxiety and depression 03/05/2024    Asthma May 2007    Female infertility     Fracture of wrist 1999    Right wrist    GDM (gestational diabetes mellitus)     Hypertension May 2016    Ovarian cyst     Rotator cuff syndrome Fall 2003    Left shoulder    Tennis elbow 2007       Past Surgical History:   Procedure Laterality Date    FERTILITY SURGERY  05/02/2022    IVF    WISDOM TOOTH EXTRACTION  04/2017       Social History     Occupational History    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   Tobacco Use    Smoking status: Never     Passive exposure: Never    Smokeless tobacco: Never   Vaping Use    Vaping status: Never Used   Substance and Sexual Activity    Alcohol use: Not Currently     Alcohol/week: 1.0 standard drink of alcohol     Comment: 1 a month    Drug use: No    Sexual activity: Yes     Partners: Male     Birth control/protection: Birth control pill      Social History     Social History Narrative    Not on file       Family History   Problem Relation Age of Onset    Thyroid disease Mother         Graves    Ulcerative colitis Mother     Hypertension Father     Hearing loss Father     Cancer Maternal Grandmother     Asthma Paternal Grandmother     Hearing loss Paternal Grandmother     Heart disease Paternal Grandfather     Diabetes Paternal Grandfather     Cancer Paternal Grandfather     No Known Problems Sister     Ulcerative colitis Brother     Breast cancer Neg Hx     Ovarian cancer Neg Hx     Uterine cancer Neg Hx     Colon cancer Neg Hx     Pulmonary embolism Neg Hx     Deep vein thrombosis Neg Hx        Review of Systems:      Constitutional: Denies fever, shaking or chills   Eyes:  "Denies change in visual acuity   HEENT: Denies nasal congestion or sore throat   Respiratory: Denies cough or shortness of breath   Cardiovascular: Denies chest pain or edema  Endocrine: Denies tremors, palpitations, intolerance of heat or cold, polyuria, polydipsia.  GI: Denies abdominal pain, nausea, vomiting, bloody stools or diarrhea  : Denies frequency, urgency, incontinence, retention, or nocturia.  Musculoskeletal: Denies numbness, tingling or loss of motor function except as above  Integument: Denies rash, lesion or ulceration   Neurologic: Denies headache or focal weakness, deficits  Heme: Denies spontaneous or excessive bleeding, epistaxis, hematuria, melena, fatigue, enlarged or tender lymph nodes.      All other pertinent positives and negatives as noted above in HPI.    Physical Exam: 34 y.o. female    Vitals:    08/28/24 0810   Temp: 98.6 °F (37 °C)   Weight: 118 kg (260 lb 4.8 oz)   Height: 167.6 cm (66\")       General:  Patient is awake and alert.  Appears in no acute distress or discomfort.    Psych:  Affect and demeanor are appropriate.    Cardiovascular:  Regular rate and rhythm.    Lungs:  Good chest expansion.  Breathing unlabored.    Lymph:  No palpable masses or adenopathy in the right lower extremity    Extremities: Her right lower extremity is examined.  Skin is benign.  She has a trace effusion.  Moderate lateral tenderness without a palpable defect or step-off.  She can fully extend the knee.  Flexion is to about 45 degrees or so.  I did not stress her knee in varus or valgus.  I did perform a Lachman's and her knee seems stable.  I could not quite bend her enough to do a posterior drawer.  She can actively extend her knee.  Her calf is soft.  She has intact motor and sensory function in her lower leg and foot.  She has good capillary refill.         Radiology:   AP and lateral views right knee are ordered and reviewed to evaluate her injury.  No comparison films are immediately " available.  She appears to have a nondisplaced fibular head fracture.  No other significant findings are noted.    MRI right knee is reviewed.  She has a fibular head fracture.  There is a partial strain of the lateral collateral ligament and posterolateral corner although the structures are intact.  She appears to have some patellofemoral arthritis with subchondral marrow edema in the patella.  No meniscal or other ligamentous pathology.    Assessment/Plan: Right fibular head fracture with posterior lateral corner injury    I told her that I think the healing potential for this injury is very high.  I expect that she will do well with conservative treatment.  Her knee is already starting to get stiff.  I recommend we try to get her moving although we need to protect her from varus/valgus.  I suggested we have her discontinue the knee immobilizer and switch to a hinged knee brace.  I would like her to get working on range of motion.  I am fine with her slowly weaning off the crutches as she can tolerate.  I would like to see her back in 2 to 3 weeks for repeat x-rays and reevaluation.  If she is still struggling with knee motion at that point then my plan would be to get her into formal PT.  I did agree to refill the Norco for her.  Risk were discussed.  All of her and her 's questions were answered in detail.  They are in agreement with the plan as described.    Jw Washburn MD    08/28/2024    CC to Luis Armando Kidd MD

## 2024-09-11 ENCOUNTER — OFFICE VISIT (OUTPATIENT)
Dept: ORTHOPEDIC SURGERY | Facility: CLINIC | Age: 34
End: 2024-09-11
Payer: COMMERCIAL

## 2024-09-11 VITALS — TEMPERATURE: 98.7 F | WEIGHT: 260.8 LBS | BODY MASS INDEX: 41.91 KG/M2 | HEIGHT: 66 IN

## 2024-09-11 DIAGNOSIS — Z09 FRACTURE FOLLOW-UP: ICD-10-CM

## 2024-09-11 DIAGNOSIS — S82.839A CLOSED FRACTURE OF PROXIMAL END OF FIBULA, UNSPECIFIED FRACTURE MORPHOLOGY, INITIAL ENCOUNTER: Primary | ICD-10-CM

## 2024-09-11 PROCEDURE — 73560 X-RAY EXAM OF KNEE 1 OR 2: CPT | Performed by: ORTHOPAEDIC SURGERY

## 2024-09-11 PROCEDURE — 99024 POSTOP FOLLOW-UP VISIT: CPT | Performed by: ORTHOPAEDIC SURGERY

## 2024-09-11 RX ORDER — NORETHINDRONE 0.35 MG/1
TABLET ORAL
COMMUNITY
Start: 2024-09-10

## 2024-09-11 NOTE — PROGRESS NOTES
"Paola Neves : 1990 MRN: [id number] DATE: 2024    CC:  6 weeks s/p closed treatment of right proximal fibula fracture    HPI: Patient returns to clinic today stating pain is improved.  Reports compliance with the brace and activity restrictions.      Vitals:    24 1453   Temp: 98.7 °F (37.1 °C)   Weight: 118 kg (260 lb 12.8 oz)   Height: 167.6 cm (66\")        Exam:  Brace in place and fitting well.  Skin is benign.  Calf soft.  Negative Lemuel's sign.  Mild tenderness over fibular head.  Knee motion:  0-115.  Good motor and sensory function distally in foot.  Palpable pulses with good cap refill.      Imagin view x-rays of right knee including AP, lateral views are ordered and reviewed by me to evaluate alignment and for comparison purposes. Alignment is well maintained.  No concerning findings.    Impression:  6 weeks s/p closed treatment of right proximal fibula fracture    Plan: Overall, I really feel like she is doing well.  I am fine with her coming out of the brace to work on range of motion but I do think it would be a good idea for her to continue to use the brace when she is walking or active.  We discussed appropriate activity modifications and restrictions.  I recommend follow up in 6 weeks for repeat x-rays.     Jw Washburn MD      2024    "

## 2024-10-09 ENCOUNTER — OFFICE VISIT (OUTPATIENT)
Dept: ORTHOPEDIC SURGERY | Facility: CLINIC | Age: 34
End: 2024-10-09
Payer: COMMERCIAL

## 2024-10-09 VITALS — TEMPERATURE: 98.6 F | BODY MASS INDEX: 41.11 KG/M2 | HEIGHT: 66 IN | WEIGHT: 255.8 LBS

## 2024-10-09 DIAGNOSIS — S82.839A CLOSED FRACTURE OF PROXIMAL END OF FIBULA, UNSPECIFIED FRACTURE MORPHOLOGY, INITIAL ENCOUNTER: Primary | ICD-10-CM

## 2024-10-09 DIAGNOSIS — Z09 FRACTURE FOLLOW-UP: ICD-10-CM

## 2024-10-09 NOTE — PROGRESS NOTES
CC:  Follow up right proximal fibula fracture    Ms. Neves follows up for her right proximal fibula fracture.  She reports that the leg seems to be doing great.  She is not having any pain.  She has been fully compliant with use of the brace and the restrictions.    Her brace was in place and removed.  No tenderness over the fibular head.  She has full knee extension and flexion to 130.  Varus and valgus stress are both well-tolerated and she seems stable.    AP and lateral views right knee are ordered and reviewed evaluate healing of her fracture.  He is compared to previous x-rays.  There does appear to be some early callus formation.    Assessment: Healing right proximal fibula fracture    Plan: She seems to be doing well.  I am fine with her transitioning out of the brace.  We discussed appropriate activity modifications and restrictions.  I will see her back in 6 weeks.  I anticipate full release at that point.    Electronically signed by Jw Washburn MD, 10/09/24, 3:29 PM EDT.

## 2024-10-10 ENCOUNTER — TELEPHONE (OUTPATIENT)
Dept: FAMILY MEDICINE CLINIC | Facility: CLINIC | Age: 34
End: 2024-10-10
Payer: COMMERCIAL

## 2024-10-10 NOTE — TELEPHONE ENCOUNTER
HUB TO RELAY:   Tried to call patient regarding her appointment with Dr. Kidd on 12/04/2024 for a physical.   Was calling to see if patient needed to schedule fasting labs & to see if the patient needed to be seen sooner for her BP.   Patient did not answer, LVM.

## 2024-11-20 ENCOUNTER — OFFICE VISIT (OUTPATIENT)
Dept: ORTHOPEDIC SURGERY | Facility: CLINIC | Age: 34
End: 2024-11-20
Payer: COMMERCIAL

## 2024-11-20 VITALS — TEMPERATURE: 97.8 F | WEIGHT: 260.3 LBS | HEIGHT: 66 IN | BODY MASS INDEX: 41.83 KG/M2

## 2024-11-20 DIAGNOSIS — S82.839A CLOSED FRACTURE OF PROXIMAL END OF FIBULA, UNSPECIFIED FRACTURE MORPHOLOGY, INITIAL ENCOUNTER: Primary | ICD-10-CM

## 2024-11-20 NOTE — PROGRESS NOTES
Chief complaint: Follow-up right proximal fibula fracture    Ms. Neves follows up today for her right fibula fracture.  She reports that she feels like the knee is doing great.  She is not having any pain.  She is back to all of her normal activities.    Exam: Right knee is examined.  Skin is benign.  No tenderness.  Full symmetric motion.  No instability.  Gait is normal.    AP and lateral views right knee are ordered and reviewed to evaluate her fracture.  These compared to previous x-rays.  Alignment looks the same.  There has been some callus formation although the fracture line is still visible.    Assessment: Healing right proximal fibula fracture    Plan: She is doing great.  I am going to release her to follow-up with me as needed.    Jw Washburn MD

## 2024-11-26 DIAGNOSIS — Z00.00 ANNUAL PHYSICAL EXAM: Primary | ICD-10-CM

## 2024-11-26 DIAGNOSIS — Z00.00 ANNUAL PHYSICAL EXAM: ICD-10-CM

## 2024-11-28 LAB
ALBUMIN SERPL-MCNC: 4.1 G/DL (ref 3.5–5.2)
ALBUMIN/GLOB SERPL: 1.6 G/DL
ALP SERPL-CCNC: 114 U/L (ref 39–117)
ALT SERPL-CCNC: 16 U/L (ref 1–33)
APPEARANCE UR: CLEAR
AST SERPL-CCNC: 15 U/L (ref 1–32)
BILIRUB SERPL-MCNC: 0.3 MG/DL (ref 0–1.2)
BILIRUB UR QL STRIP: NEGATIVE
BUN SERPL-MCNC: 15 MG/DL (ref 6–20)
BUN/CREAT SERPL: 18.8 (ref 7–25)
CALCIUM SERPL-MCNC: 9.2 MG/DL (ref 8.6–10.5)
CHLORIDE SERPL-SCNC: 103 MMOL/L (ref 98–107)
CHOLEST SERPL-MCNC: 189 MG/DL (ref 0–200)
CO2 SERPL-SCNC: 27.9 MMOL/L (ref 22–29)
COLOR UR: YELLOW
CREAT SERPL-MCNC: 0.8 MG/DL (ref 0.57–1)
EGFRCR SERPLBLD CKD-EPI 2021: 99.3 ML/MIN/1.73
ERYTHROCYTE [DISTWIDTH] IN BLOOD BY AUTOMATED COUNT: 13.5 % (ref 12.3–15.4)
GLOBULIN SER CALC-MCNC: 2.6 GM/DL
GLUCOSE SERPL-MCNC: 87 MG/DL (ref 65–99)
GLUCOSE UR QL STRIP: NEGATIVE
HCT VFR BLD AUTO: 40.9 % (ref 34–46.6)
HDLC SERPL-MCNC: 48 MG/DL (ref 40–60)
HGB BLD-MCNC: 13 G/DL (ref 12–15.9)
HGB UR QL STRIP: NEGATIVE
KETONES UR QL STRIP: NEGATIVE
LDLC SERPL CALC-MCNC: 125 MG/DL (ref 0–100)
LDLC/HDLC SERPL: 2.57 {RATIO}
LEUKOCYTE ESTERASE UR QL STRIP: NEGATIVE
MCH RBC QN AUTO: 26.4 PG (ref 26.6–33)
MCHC RBC AUTO-ENTMCNC: 31.8 G/DL (ref 31.5–35.7)
MCV RBC AUTO: 83.1 FL (ref 79–97)
NITRITE UR QL STRIP: NEGATIVE
PH UR STRIP: 6 [PH] (ref 5–8)
PLATELET # BLD AUTO: 281 10*3/MM3 (ref 140–450)
POTASSIUM SERPL-SCNC: 4.3 MMOL/L (ref 3.5–5.2)
PROT SERPL-MCNC: 6.7 G/DL (ref 6–8.5)
PROT UR QL STRIP: ABNORMAL
RBC # BLD AUTO: 4.92 10*6/MM3 (ref 3.77–5.28)
SODIUM SERPL-SCNC: 142 MMOL/L (ref 136–145)
SP GR UR STRIP: 1.02 (ref 1–1.03)
TRIGL SERPL-MCNC: 88 MG/DL (ref 0–150)
UROBILINOGEN UR STRIP-MCNC: ABNORMAL MG/DL
VLDLC SERPL CALC-MCNC: 16 MG/DL (ref 5–40)
WBC # BLD AUTO: 10.69 10*3/MM3 (ref 3.4–10.8)

## 2024-12-04 ENCOUNTER — OFFICE VISIT (OUTPATIENT)
Dept: FAMILY MEDICINE CLINIC | Facility: CLINIC | Age: 34
End: 2024-12-04
Payer: COMMERCIAL

## 2024-12-04 VITALS
HEART RATE: 98 BPM | WEIGHT: 267 LBS | BODY MASS INDEX: 42.91 KG/M2 | HEIGHT: 66 IN | OXYGEN SATURATION: 98 % | SYSTOLIC BLOOD PRESSURE: 122 MMHG | DIASTOLIC BLOOD PRESSURE: 86 MMHG | TEMPERATURE: 98 F

## 2024-12-04 DIAGNOSIS — Z00.00 ROUTINE ADULT HEALTH MAINTENANCE: ICD-10-CM

## 2024-12-04 DIAGNOSIS — F41.9 ANXIETY AND DEPRESSION: ICD-10-CM

## 2024-12-04 DIAGNOSIS — O13.9 GESTATIONAL HYPERTENSION, ANTEPARTUM: ICD-10-CM

## 2024-12-04 DIAGNOSIS — F32.A ANXIETY AND DEPRESSION: ICD-10-CM

## 2024-12-04 DIAGNOSIS — J45.30 MILD PERSISTENT ASTHMA WITHOUT COMPLICATION: Primary | ICD-10-CM

## 2024-12-04 PROBLEM — Z28.39 RUBELLA NON-IMMUNE STATUS, ANTEPARTUM: Status: RESOLVED | Noted: 2024-01-31 | Resolved: 2024-12-04

## 2024-12-04 PROBLEM — O09.899 RUBELLA NON-IMMUNE STATUS, ANTEPARTUM: Status: RESOLVED | Noted: 2024-01-31 | Resolved: 2024-12-04

## 2024-12-04 PROBLEM — O36.5922 IUGR (INTRAUTERINE GROWTH RESTRICTION) AFFECTING CARE OF MOTHER, SECOND TRIMESTER, FETUS 2: Status: RESOLVED | Noted: 2024-04-18 | Resolved: 2024-12-04

## 2024-12-04 PROBLEM — Z34.90 PREGNANCY: Status: RESOLVED | Noted: 2024-05-19 | Resolved: 2024-12-04

## 2024-12-04 PROCEDURE — 99395 PREV VISIT EST AGE 18-39: CPT | Performed by: FAMILY MEDICINE

## 2024-12-04 RX ORDER — SERTRALINE HYDROCHLORIDE 25 MG/1
25 TABLET, FILM COATED ORAL DAILY
Qty: 90 TABLET | Refills: 3 | Status: SHIPPED | OUTPATIENT
Start: 2024-12-04

## 2024-12-04 RX ORDER — ALBUTEROL SULFATE 90 UG/1
2 INHALANT RESPIRATORY (INHALATION) EVERY 4 HOURS PRN
Qty: 18 G | Refills: 1 | Status: SHIPPED | OUTPATIENT
Start: 2024-12-04

## 2024-12-04 NOTE — PROGRESS NOTES
Chief Complaint   Patient presents with    Annual Exam       Subjective   Paola Neves is a 34 y.o. female and is here for a yearly physical exam. The patient reports problems - Paola is doing great.  She and her  had a successful surrogate pregnancy.  Had implants of 2 full embryos.  From a donor, both were successful, she explained they were left over embryos from another couple going through in vitro fertilization.  The embryos have been frozen since 2011..  She has fraternal twins ; a boy and girl ;now 6 months old.  They were born premature at 31 weeks but they are both doing fine now .she is weaning off breast-feeding.  One of her twins never did take to breast-feeding and has been bottle-fed.  The other baby is very slow breast feeder.  So she is switching over to bottle formula. .    Do you take any herbs or supplements that were not prescribed by a doctor? yes. If so, these will be added to active medication list.    The following portions of the patient's history were reviewed and updated as appropriate: allergies, current medications, past family history, past medical history, past social history, past surgical history, and problem list.    Social and Family and Surgical History reviewed and updated today.    Health and Surgical History, Preventive Measures and Vaccination flow sheets reviewed and updated today.    Patient's current medical chart in Epic; including previous office notes, Mychart messages, recent phone calls, imaging, labs, specialist's evaluation either in notes or in Media tab reviewed today.    Other outside pertinent medical information also reviewed thru Care Everywhere function is also reviewed today.    Review of Systems  Review of Systems  Pertinent items are noted in HPI.    Vitals:    12/04/24 1502   BP: 122/86   BP Location: Right arm   Patient Position: Sitting   Cuff Size: Large Adult   Pulse: 98   Temp: 98 °F (36.7 °C)   SpO2: 98%   Weight: 121 kg (267 lb)  "  Height: 167.6 cm (66\")     Body mass index is 43.09 kg/m².             General Appearance:  Alert, cooperative, no distress, appears stated age   Head:  Normocephalic, without obvious abnormality, atraumatic   Eyes:  PERRL, conjunctiva/corneas clear, EOM's intact.   Ears:  Normal external ear canals, both ears   Nose: Nares normal, septum midline, mucosa normal, no drainage or sinus tenderness   Throat: Lips, mucosa, and tongue normal; teeth and gums viewed   Neck: Supple, symmetrical,  no adenopathy;   thyroid: No enlargement/tenderness/nodules;   no carotid bruit   Back:  Symmetric, no curvature, ROM normal, no CVA tenderness   Lungs:  Clear to auscultation bilaterally, respirations unlabored   Chest wall:  No tenderness or deformity   Heart:  Regular rate and rhythm, S1 and S2 normal, no murmur.   Abdomen:  Soft, non-tender,    Rectal:        Extremities: Extremities normal, atraumatic, no  edema   Pulses: 2+ and symmetric all extremities   Skin: Skin color, texture, turgor normal, no rashes. No suspicious lesions   Lymph nodes: Cervical,  normal   Neurologic: CNII-XII intact. Normal strength, sensation.          Results for orders placed or performed in visit on 11/26/24   Urinalysis With Microscopic If Indicated (No Culture) - Urine, Clean Catch    Collection Time: 11/27/24 10:31 AM    Specimen: Urine, Clean Catch   Result Value Ref Range    Specific Gravity, UA 1.023 1.005 - 1.030    pH, UA 6.0 5.0 - 8.0    Color, UA Yellow     Appearance, UA Clear Clear    Leukocytes, UA Negative Negative    Protein Trace (A) Negative    Glucose, UA Negative Negative    Ketones Negative Negative    Blood, UA Negative Negative    Bilirubin, UA Negative Negative    Urobilinogen, UA Comment     Nitrite, UA Negative Negative   Lipid Panel With LDL/HDL Ratio    Collection Time: 11/27/24 10:31 AM    Specimen: Blood   Result Value Ref Range    Total Cholesterol 189 0 - 200 mg/dL    Triglycerides 88 0 - 150 mg/dL    HDL Cholesterol " 48 40 - 60 mg/dL    VLDL Cholesterol Guille 16 5 - 40 mg/dL    LDL Chol Calc (NIH) 125 (H) 0 - 100 mg/dL    LDL/HDL RATIO 2.57    Comprehensive metabolic panel    Collection Time: 11/27/24 10:31 AM    Specimen: Blood   Result Value Ref Range    Glucose 87 65 - 99 mg/dL    BUN 15 6 - 20 mg/dL    Creatinine 0.80 0.57 - 1.00 mg/dL    EGFR Result 99.3 >60.0 mL/min/1.73    BUN/Creatinine Ratio 18.8 7.0 - 25.0    Sodium 142 136 - 145 mmol/L    Potassium 4.3 3.5 - 5.2 mmol/L    Chloride 103 98 - 107 mmol/L    Total CO2 27.9 22.0 - 29.0 mmol/L    Calcium 9.2 8.6 - 10.5 mg/dL    Total Protein 6.7 6.0 - 8.5 g/dL    Albumin 4.1 3.5 - 5.2 g/dL    Globulin 2.6 gm/dL    A/G Ratio 1.6 g/dL    Total Bilirubin 0.3 0.0 - 1.2 mg/dL    Alkaline Phosphatase 114 39 - 117 U/L    AST (SGOT) 15 1 - 32 U/L    ALT (SGPT) 16 1 - 33 U/L   CBC (No Diff)    Collection Time: 11/27/24 10:31 AM    Specimen: Blood   Result Value Ref Range    WBC 10.69 3.40 - 10.80 10*3/mm3    RBC 4.92 3.77 - 5.28 10*6/mm3    Hemoglobin 13.0 12.0 - 15.9 g/dL    Hematocrit 40.9 34.0 - 46.6 %    MCV 83.1 79.0 - 97.0 fL    MCH 26.4 (L) 26.6 - 33.0 pg    MCHC 31.8 31.5 - 35.7 g/dL    RDW 13.5 12.3 - 15.4 %    Platelets 281 140 - 450 10*3/mm3     Assessment & Plan   Healthy female exam.  Diagnoses and all orders for this visit:    1. Mild persistent asthma without complication (Primary)  -     albuterol sulfate  (90 Base) MCG/ACT inhaler; Inhale 2 puffs Every 4 (Four) Hours As Needed for Wheezing.  Dispense: 18 g; Refill: 1    2. Routine adult health maintenance    3. Anxiety and depression  -     sertraline (ZOLOFT) 25 MG tablet; Take 1 tablet by mouth Daily.  Dispense: 90 tablet; Refill: 3    4. Gestational hypertension, antepartum      1.  while walking her dog.  No surgery was required healed up.  Labs reviewed  Reports she still needs to take the 25 mg Zoloft for anxiety and depression we will renew that    Her asthma is well-controlled.  Rarely has to use the  albuterol inhaler.  Asking for refill    Had high blood pressure during pregnancy.  Is now taking half the original dose.  Blood pressure looks well-controlled, probably okay to wean off the Procardia altogether at this time.  She will check her blood pressure several times a week and monitor    2. Patient Counseling:  --Nutrition: Stressed importance of moderation in: sodium, caffeine, , saturated fat and cholesterol.  Discussed: caloric balance, sufficient intake of fresh fruits, vegetables, fiber, calcium, iron.  --Exercise: Stressed the importance of regular exercise.   --Substance Abuse: Discussed cessation and or reduction of tobacco, alcohol, or other drug use; driving or other dangerous activities under the influence.    --Dental health: Discussed importance of regular tooth brushing, flossing, and dental visits.  --Suggested having eyes and vision checked if needed or past due.  --Immunizations reviewed and given if needed.  --..  3. Discussed the patient's BMI with her.  The BMI is above average; BMI management plan is completed  4. Follow up in one year    There are no Patient Instructions on file for this visit.    Medications Discontinued During This Encounter   Medication Reason    HYDROcodone-acetaminophen (NORCO) 5-325 MG per tablet *Therapy completed    ibuprofen (ADVIL,MOTRIN) 800 MG tablet *Therapy completed    HYDROcodone-acetaminophen (NORCO) 5-325 MG per tablet *Therapy completed    oxyCODONE (ROXICODONE) 5 MG immediate release tablet Discontinued by another clinician    NIFEdipine XL (Procardia XL) 60 MG 24 hr tablet *Therapy completed    albuterol sulfate  (90 Base) MCG/ACT inhaler Reorder    sertraline (ZOLOFT) 25 MG tablet Reorder        Dr. Luis Armando Kidd MD  [place], [place]  [facility]